# Patient Record
Sex: MALE | Race: WHITE | Employment: OTHER | URBAN - METROPOLITAN AREA
[De-identification: names, ages, dates, MRNs, and addresses within clinical notes are randomized per-mention and may not be internally consistent; named-entity substitution may affect disease eponyms.]

---

## 2017-01-16 RX ORDER — NIFEDIPINE 60 MG/1
TABLET, EXTENDED RELEASE ORAL
Qty: 90 TABLET | Refills: 2 | Status: SHIPPED | OUTPATIENT
Start: 2017-01-16 | End: 2017-10-04 | Stop reason: SDUPTHER

## 2017-01-16 RX ORDER — LISINOPRIL 40 MG/1
TABLET ORAL
Qty: 90 TABLET | Refills: 2 | Status: SHIPPED | OUTPATIENT
Start: 2017-01-16 | End: 2017-10-04 | Stop reason: SDUPTHER

## 2017-03-08 RX ORDER — METFORMIN HYDROCHLORIDE 500 MG/1
TABLET, EXTENDED RELEASE ORAL
Qty: 360 TABLET | Refills: 1 | Status: SHIPPED | OUTPATIENT
Start: 2017-03-08 | End: 2017-08-30 | Stop reason: SDUPTHER

## 2017-03-10 DIAGNOSIS — E78.5 DM TYPE 2 WITH DIABETIC DYSLIPIDEMIA (HCC): ICD-10-CM

## 2017-03-10 DIAGNOSIS — E78.5 HYPERLIPIDEMIA, UNSPECIFIED HYPERLIPIDEMIA TYPE: ICD-10-CM

## 2017-03-10 DIAGNOSIS — R53.83 OTHER MALAISE AND FATIGUE: ICD-10-CM

## 2017-03-10 DIAGNOSIS — R53.81 OTHER MALAISE AND FATIGUE: ICD-10-CM

## 2017-03-10 DIAGNOSIS — E11.69 DM TYPE 2 WITH DIABETIC DYSLIPIDEMIA (HCC): ICD-10-CM

## 2017-03-10 LAB
ALBUMIN SERPL-MCNC: 4.1 G/DL (ref 3.9–4.9)
ALP BLD-CCNC: 99 U/L (ref 35–104)
ALT SERPL-CCNC: 15 U/L (ref 0–41)
ANION GAP SERPL CALCULATED.3IONS-SCNC: 13 MEQ/L (ref 7–13)
ANISOCYTOSIS: ABNORMAL
AST SERPL-CCNC: 18 U/L (ref 0–40)
BASOPHILS ABSOLUTE: 0.1 K/UL (ref 0–0.2)
BASOPHILS RELATIVE PERCENT: 1.1 %
BILIRUB SERPL-MCNC: 0.5 MG/DL (ref 0–1.2)
BUN BLDV-MCNC: 20 MG/DL (ref 8–23)
BURR CELLS: ABNORMAL
CALCIUM SERPL-MCNC: 9.6 MG/DL (ref 8.6–10.2)
CHLORIDE BLD-SCNC: 101 MEQ/L (ref 98–107)
CHOLESTEROL, TOTAL: 128 MG/DL (ref 0–199)
CO2: 26 MEQ/L (ref 22–29)
CREAT SERPL-MCNC: 1.2 MG/DL (ref 0.7–1.2)
EOSINOPHILS ABSOLUTE: 0.4 K/UL (ref 0–0.7)
EOSINOPHILS RELATIVE PERCENT: 5 %
GFR AFRICAN AMERICAN: >60
GFR NON-AFRICAN AMERICAN: 59.8
GLOBULIN: 3.1 G/DL (ref 2.3–3.5)
GLUCOSE BLD-MCNC: 154 MG/DL (ref 74–109)
HBA1C MFR BLD: 7.5 % (ref 4.8–5.9)
HCT VFR BLD CALC: 38.4 % (ref 42–52)
HDLC SERPL-MCNC: 41 MG/DL (ref 40–59)
HEMOGLOBIN: 12.5 G/DL (ref 14–18)
LDL CHOLESTEROL CALCULATED: 67 MG/DL (ref 0–129)
LYMPHOCYTES ABSOLUTE: 2.5 K/UL (ref 1–4.8)
LYMPHOCYTES RELATIVE PERCENT: 35.2 %
MCH RBC QN AUTO: 30.6 PG (ref 27–31.3)
MCHC RBC AUTO-ENTMCNC: 32.6 % (ref 33–37)
MCV RBC AUTO: 94.1 FL (ref 80–100)
MONOCYTES ABSOLUTE: 1 K/UL (ref 0.2–0.8)
MONOCYTES RELATIVE PERCENT: 14.2 %
NEUTROPHILS ABSOLUTE: 3.2 K/UL (ref 1.4–6.5)
NEUTROPHILS RELATIVE PERCENT: 44.5 %
PDW BLD-RTO: 15.1 % (ref 11.5–14.5)
PLATELET # BLD: 246 K/UL (ref 130–400)
POIKILOCYTES: ABNORMAL
POTASSIUM SERPL-SCNC: 4.1 MEQ/L (ref 3.5–5.1)
RBC # BLD: 4.08 M/UL (ref 4.7–6.1)
SLIDE REVIEW: ABNORMAL
SODIUM BLD-SCNC: 140 MEQ/L (ref 132–144)
TOTAL PROTEIN: 7.2 G/DL (ref 6.4–8.1)
TRIGL SERPL-MCNC: 100 MG/DL (ref 0–200)
WBC # BLD: 7.1 K/UL (ref 4.8–10.8)

## 2017-03-13 ENCOUNTER — OFFICE VISIT (OUTPATIENT)
Dept: PRIMARY CARE CLINIC | Age: 72
End: 2017-03-13

## 2017-03-13 VITALS
HEIGHT: 73 IN | HEART RATE: 57 BPM | TEMPERATURE: 96.4 F | OXYGEN SATURATION: 97 % | RESPIRATION RATE: 16 BRPM | BODY MASS INDEX: 30.88 KG/M2 | WEIGHT: 233 LBS | SYSTOLIC BLOOD PRESSURE: 130 MMHG | DIASTOLIC BLOOD PRESSURE: 64 MMHG

## 2017-03-13 DIAGNOSIS — R53.83 OTHER MALAISE AND FATIGUE: ICD-10-CM

## 2017-03-13 DIAGNOSIS — E11.69 DM TYPE 2 WITH DIABETIC DYSLIPIDEMIA (HCC): ICD-10-CM

## 2017-03-13 DIAGNOSIS — R53.81 OTHER MALAISE AND FATIGUE: ICD-10-CM

## 2017-03-13 DIAGNOSIS — J30.2 SEASONAL ALLERGIC RHINITIS, UNSPECIFIED ALLERGIC RHINITIS TRIGGER: ICD-10-CM

## 2017-03-13 DIAGNOSIS — I10 ESSENTIAL HYPERTENSION: ICD-10-CM

## 2017-03-13 DIAGNOSIS — E78.5 HYPERLIPIDEMIA, UNSPECIFIED HYPERLIPIDEMIA TYPE: ICD-10-CM

## 2017-03-13 DIAGNOSIS — E78.5 DM TYPE 2 WITH DIABETIC DYSLIPIDEMIA (HCC): ICD-10-CM

## 2017-03-13 DIAGNOSIS — M70.21 OLECRANON BURSITIS OF RIGHT ELBOW: Primary | ICD-10-CM

## 2017-03-13 PROCEDURE — 3017F COLORECTAL CA SCREEN DOC REV: CPT | Performed by: FAMILY MEDICINE

## 2017-03-13 PROCEDURE — G8419 CALC BMI OUT NRM PARAM NOF/U: HCPCS | Performed by: FAMILY MEDICINE

## 2017-03-13 PROCEDURE — G8427 DOCREV CUR MEDS BY ELIG CLIN: HCPCS | Performed by: FAMILY MEDICINE

## 2017-03-13 PROCEDURE — G8484 FLU IMMUNIZE NO ADMIN: HCPCS | Performed by: FAMILY MEDICINE

## 2017-03-13 PROCEDURE — 1036F TOBACCO NON-USER: CPT | Performed by: FAMILY MEDICINE

## 2017-03-13 PROCEDURE — 4040F PNEUMOC VAC/ADMIN/RCVD: CPT | Performed by: FAMILY MEDICINE

## 2017-03-13 PROCEDURE — 99214 OFFICE O/P EST MOD 30 MIN: CPT | Performed by: FAMILY MEDICINE

## 2017-03-13 PROCEDURE — 3045F PR MOST RECENT HEMOGLOBIN A1C LEVEL 7.0-9.0%: CPT | Performed by: FAMILY MEDICINE

## 2017-03-13 PROCEDURE — 1123F ACP DISCUSS/DSCN MKR DOCD: CPT | Performed by: FAMILY MEDICINE

## 2017-03-13 ASSESSMENT — ENCOUNTER SYMPTOMS
SHORTNESS OF BREATH: 1
BLURRED VISION: 0

## 2017-04-23 RX ORDER — MONTELUKAST SODIUM 10 MG/1
TABLET ORAL
Qty: 90 TABLET | Refills: 3 | Status: SHIPPED | OUTPATIENT
Start: 2017-04-23 | End: 2018-04-04 | Stop reason: SDUPTHER

## 2017-04-23 RX ORDER — SOTALOL HYDROCHLORIDE 120 MG/1
TABLET ORAL
Qty: 180 TABLET | Refills: 2 | Status: SHIPPED | OUTPATIENT
Start: 2017-04-23 | End: 2018-01-07 | Stop reason: SDUPTHER

## 2017-05-25 RX ORDER — PIOGLITAZONEHYDROCHLORIDE 45 MG/1
TABLET ORAL
Qty: 90 TABLET | Refills: 1 | Status: SHIPPED | OUTPATIENT
Start: 2017-05-25 | End: 2017-11-15 | Stop reason: SDUPTHER

## 2017-06-12 DIAGNOSIS — R53.81 OTHER MALAISE AND FATIGUE: ICD-10-CM

## 2017-06-12 DIAGNOSIS — R53.83 OTHER MALAISE AND FATIGUE: ICD-10-CM

## 2017-06-12 DIAGNOSIS — I10 ESSENTIAL HYPERTENSION: ICD-10-CM

## 2017-06-12 DIAGNOSIS — E78.5 DM TYPE 2 WITH DIABETIC DYSLIPIDEMIA (HCC): ICD-10-CM

## 2017-06-12 DIAGNOSIS — E11.69 DM TYPE 2 WITH DIABETIC DYSLIPIDEMIA (HCC): ICD-10-CM

## 2017-06-12 LAB
ALBUMIN SERPL-MCNC: 3.9 G/DL (ref 3.9–4.9)
ALP BLD-CCNC: 85 U/L (ref 35–104)
ALT SERPL-CCNC: 13 U/L (ref 0–41)
ANION GAP SERPL CALCULATED.3IONS-SCNC: 13 MEQ/L (ref 7–13)
AST SERPL-CCNC: 18 U/L (ref 0–40)
BASOPHILS ABSOLUTE: 0.1 K/UL (ref 0–0.2)
BASOPHILS RELATIVE PERCENT: 1.1 %
BILIRUB SERPL-MCNC: 0.6 MG/DL (ref 0–1.2)
BUN BLDV-MCNC: 35 MG/DL (ref 8–23)
CALCIUM SERPL-MCNC: 9.6 MG/DL (ref 8.6–10.2)
CHLORIDE BLD-SCNC: 100 MEQ/L (ref 98–107)
CHOLESTEROL, TOTAL: 116 MG/DL (ref 0–199)
CO2: 25 MEQ/L (ref 22–29)
CREAT SERPL-MCNC: 1.45 MG/DL (ref 0.7–1.2)
EOSINOPHILS ABSOLUTE: 0.4 K/UL (ref 0–0.7)
EOSINOPHILS RELATIVE PERCENT: 6.2 %
GFR AFRICAN AMERICAN: 58.1
GFR NON-AFRICAN AMERICAN: 48
GLOBULIN: 2.9 G/DL (ref 2.3–3.5)
GLUCOSE BLD-MCNC: 130 MG/DL (ref 74–109)
HBA1C MFR BLD: 7.1 % (ref 4.8–5.9)
HCT VFR BLD CALC: 35.7 % (ref 42–52)
HDLC SERPL-MCNC: 37 MG/DL (ref 40–59)
HEMOGLOBIN: 11.8 G/DL (ref 14–18)
LDL CHOLESTEROL CALCULATED: 56 MG/DL (ref 0–129)
LYMPHOCYTES ABSOLUTE: 2.5 K/UL (ref 1–4.8)
LYMPHOCYTES RELATIVE PERCENT: 38.2 %
MCH RBC QN AUTO: 30.7 PG (ref 27–31.3)
MCHC RBC AUTO-ENTMCNC: 32.9 % (ref 33–37)
MCV RBC AUTO: 93.3 FL (ref 80–100)
MONOCYTES ABSOLUTE: 1.2 K/UL (ref 0.2–0.8)
MONOCYTES RELATIVE PERCENT: 18.1 %
NEUTROPHILS ABSOLUTE: 2.3 K/UL (ref 1.4–6.5)
NEUTROPHILS RELATIVE PERCENT: 36.4 %
PDW BLD-RTO: 15.2 % (ref 11.5–14.5)
PLATELET # BLD: 218 K/UL (ref 130–400)
POTASSIUM SERPL-SCNC: 4 MEQ/L (ref 3.5–5.1)
RBC # BLD: 3.82 M/UL (ref 4.7–6.1)
SODIUM BLD-SCNC: 138 MEQ/L (ref 132–144)
TOTAL PROTEIN: 6.8 G/DL (ref 6.4–8.1)
TRIGL SERPL-MCNC: 115 MG/DL (ref 0–200)
WBC # BLD: 6.4 K/UL (ref 4.8–10.8)

## 2017-06-14 ENCOUNTER — OFFICE VISIT (OUTPATIENT)
Dept: PRIMARY CARE CLINIC | Age: 72
End: 2017-06-14

## 2017-06-14 VITALS
HEART RATE: 55 BPM | BODY MASS INDEX: 30.35 KG/M2 | DIASTOLIC BLOOD PRESSURE: 62 MMHG | RESPIRATION RATE: 16 BRPM | TEMPERATURE: 96.7 F | SYSTOLIC BLOOD PRESSURE: 130 MMHG | WEIGHT: 229 LBS | HEIGHT: 73 IN | OXYGEN SATURATION: 97 %

## 2017-06-14 DIAGNOSIS — M47.16 OSTEOARTHRITIS OF LUMBAR SPINE WITH MYELOPATHY: ICD-10-CM

## 2017-06-14 DIAGNOSIS — E78.5 DM TYPE 2 WITH DIABETIC DYSLIPIDEMIA (HCC): ICD-10-CM

## 2017-06-14 DIAGNOSIS — J30.1 SEASONAL ALLERGIC RHINITIS DUE TO POLLEN: ICD-10-CM

## 2017-06-14 DIAGNOSIS — I10 ESSENTIAL HYPERTENSION: Primary | ICD-10-CM

## 2017-06-14 DIAGNOSIS — R06.02 SOB (SHORTNESS OF BREATH): ICD-10-CM

## 2017-06-14 DIAGNOSIS — E11.69 DM TYPE 2 WITH DIABETIC DYSLIPIDEMIA (HCC): ICD-10-CM

## 2017-06-14 DIAGNOSIS — R42 DIZZINESS: ICD-10-CM

## 2017-06-14 DIAGNOSIS — N18.2 TYPE 2 DIABETES MELLITUS WITH STAGE 2 CHRONIC KIDNEY DISEASE, WITHOUT LONG-TERM CURRENT USE OF INSULIN (HCC): ICD-10-CM

## 2017-06-14 DIAGNOSIS — J30.1 ALLERGIC RHINITIS DUE TO POLLEN, UNSPECIFIED RHINITIS SEASONALITY: ICD-10-CM

## 2017-06-14 DIAGNOSIS — E11.22 TYPE 2 DIABETES MELLITUS WITH STAGE 2 CHRONIC KIDNEY DISEASE, WITHOUT LONG-TERM CURRENT USE OF INSULIN (HCC): ICD-10-CM

## 2017-06-14 PROCEDURE — 99214 OFFICE O/P EST MOD 30 MIN: CPT | Performed by: FAMILY MEDICINE

## 2017-06-14 PROCEDURE — 1123F ACP DISCUSS/DSCN MKR DOCD: CPT | Performed by: FAMILY MEDICINE

## 2017-06-14 PROCEDURE — G8417 CALC BMI ABV UP PARAM F/U: HCPCS | Performed by: FAMILY MEDICINE

## 2017-06-14 PROCEDURE — 93000 ELECTROCARDIOGRAM COMPLETE: CPT | Performed by: FAMILY MEDICINE

## 2017-06-14 PROCEDURE — 3046F HEMOGLOBIN A1C LEVEL >9.0%: CPT | Performed by: FAMILY MEDICINE

## 2017-06-14 PROCEDURE — G8427 DOCREV CUR MEDS BY ELIG CLIN: HCPCS | Performed by: FAMILY MEDICINE

## 2017-06-14 PROCEDURE — 3017F COLORECTAL CA SCREEN DOC REV: CPT | Performed by: FAMILY MEDICINE

## 2017-06-14 PROCEDURE — 4040F PNEUMOC VAC/ADMIN/RCVD: CPT | Performed by: FAMILY MEDICINE

## 2017-06-14 PROCEDURE — 1036F TOBACCO NON-USER: CPT | Performed by: FAMILY MEDICINE

## 2017-06-14 RX ORDER — TRIAMCINOLONE ACETONIDE 40 MG/ML
80 INJECTION, SUSPENSION INTRA-ARTICULAR; INTRAMUSCULAR ONCE
Status: COMPLETED | OUTPATIENT
Start: 2017-06-14 | End: 2017-06-14

## 2017-06-14 RX ORDER — FEXOFENADINE HCL 180 MG/1
180 TABLET ORAL DAILY
Qty: 30 TABLET | Refills: 5 | Status: SHIPPED | OUTPATIENT
Start: 2017-06-14 | End: 2021-03-19

## 2017-06-14 RX ORDER — BUDESONIDE AND FORMOTEROL FUMARATE DIHYDRATE 80; 4.5 UG/1; UG/1
2 AEROSOL RESPIRATORY (INHALATION) 2 TIMES DAILY
Qty: 2 INHALER | Refills: 0 | COMMUNITY
Start: 2017-06-14 | End: 2017-09-27

## 2017-06-14 RX ADMIN — TRIAMCINOLONE ACETONIDE 80 MG: 40 INJECTION, SUSPENSION INTRA-ARTICULAR; INTRAMUSCULAR at 09:26

## 2017-06-14 ASSESSMENT — PATIENT HEALTH QUESTIONNAIRE - PHQ9
SUM OF ALL RESPONSES TO PHQ9 QUESTIONS 1 & 2: 0
SUM OF ALL RESPONSES TO PHQ QUESTIONS 1-9: 0
2. FEELING DOWN, DEPRESSED OR HOPELESS: 0
1. LITTLE INTEREST OR PLEASURE IN DOING THINGS: 0

## 2017-06-14 ASSESSMENT — ENCOUNTER SYMPTOMS
BACK PAIN: 1
RHINORRHEA: 1

## 2017-06-19 DIAGNOSIS — R06.02 SOB (SHORTNESS OF BREATH): Primary | ICD-10-CM

## 2017-06-21 ENCOUNTER — HOSPITAL ENCOUNTER (OUTPATIENT)
Dept: NON INVASIVE DIAGNOSTICS | Age: 72
Discharge: HOME | End: 2017-06-21
Payer: MEDICARE

## 2017-06-21 ENCOUNTER — HOSPITAL ENCOUNTER (OUTPATIENT)
Dept: NUCLEAR MEDICINE | Age: 72
Discharge: HOME | End: 2017-06-21
Payer: MEDICARE

## 2017-06-21 VITALS — SYSTOLIC BLOOD PRESSURE: 112 MMHG | DIASTOLIC BLOOD PRESSURE: 70 MMHG

## 2017-06-21 DIAGNOSIS — R06.02 SOB (SHORTNESS OF BREATH): ICD-10-CM

## 2017-06-21 PROCEDURE — 3430000000 HC RX DIAGNOSTIC RADIOPHARMACEUTICAL: Performed by: FAMILY MEDICINE

## 2017-06-21 PROCEDURE — 2580000003 HC RX 258: Performed by: FAMILY MEDICINE

## 2017-06-21 PROCEDURE — 93017 CV STRESS TEST TRACING ONLY: CPT

## 2017-06-21 PROCEDURE — A9502 TC99M TETROFOSMIN: HCPCS | Performed by: FAMILY MEDICINE

## 2017-06-21 PROCEDURE — 78452 HT MUSCLE IMAGE SPECT MULT: CPT

## 2017-06-21 PROCEDURE — 6360000004 HC RX CONTRAST MEDICATION: Performed by: FAMILY MEDICINE

## 2017-06-21 RX ORDER — SODIUM CHLORIDE 0.9 % (FLUSH) 0.9 %
10 SYRINGE (ML) INJECTION 2 TIMES DAILY
Status: DISCONTINUED | OUTPATIENT
Start: 2017-06-21 | End: 2017-06-24 | Stop reason: HOSPADM

## 2017-06-21 RX ADMIN — TETROFOSMIN 10 MILLICURIE: 0.23 INJECTION, POWDER, LYOPHILIZED, FOR SOLUTION INTRAVENOUS at 07:40

## 2017-06-21 RX ADMIN — TETROFOSMIN 30 MILLICURIE: 0.23 INJECTION, POWDER, LYOPHILIZED, FOR SOLUTION INTRAVENOUS at 09:10

## 2017-06-21 RX ADMIN — Medication 10 ML: at 09:10

## 2017-06-21 RX ADMIN — REGADENOSON 0.4 MG: 0.08 INJECTION, SOLUTION INTRAVENOUS at 09:10

## 2017-06-21 RX ADMIN — Medication 10 ML: at 07:40

## 2017-06-25 ASSESSMENT — ENCOUNTER SYMPTOMS
BLURRED VISION: 0
ABDOMINAL PAIN: 0
CHANGE IN BOWEL HABIT: 0

## 2017-06-28 ENCOUNTER — OFFICE VISIT (OUTPATIENT)
Dept: PRIMARY CARE CLINIC | Age: 72
End: 2017-06-28

## 2017-06-28 VITALS
TEMPERATURE: 96.3 F | RESPIRATION RATE: 14 BRPM | WEIGHT: 230 LBS | DIASTOLIC BLOOD PRESSURE: 72 MMHG | HEIGHT: 73 IN | SYSTOLIC BLOOD PRESSURE: 138 MMHG | HEART RATE: 52 BPM | BODY MASS INDEX: 30.48 KG/M2 | OXYGEN SATURATION: 98 %

## 2017-06-28 DIAGNOSIS — E78.5 DM TYPE 2 WITH DIABETIC DYSLIPIDEMIA (HCC): ICD-10-CM

## 2017-06-28 DIAGNOSIS — J30.1 SEASONAL ALLERGIC RHINITIS DUE TO POLLEN: ICD-10-CM

## 2017-06-28 DIAGNOSIS — R06.02 SOB (SHORTNESS OF BREATH): Primary | ICD-10-CM

## 2017-06-28 DIAGNOSIS — M47.16 OSTEOARTHRITIS OF LUMBAR SPINE WITH MYELOPATHY: ICD-10-CM

## 2017-06-28 DIAGNOSIS — E11.69 DM TYPE 2 WITH DIABETIC DYSLIPIDEMIA (HCC): ICD-10-CM

## 2017-06-28 PROCEDURE — 99214 OFFICE O/P EST MOD 30 MIN: CPT | Performed by: FAMILY MEDICINE

## 2017-06-28 PROCEDURE — 3017F COLORECTAL CA SCREEN DOC REV: CPT | Performed by: FAMILY MEDICINE

## 2017-06-28 PROCEDURE — G8427 DOCREV CUR MEDS BY ELIG CLIN: HCPCS | Performed by: FAMILY MEDICINE

## 2017-06-28 PROCEDURE — G8417 CALC BMI ABV UP PARAM F/U: HCPCS | Performed by: FAMILY MEDICINE

## 2017-06-28 PROCEDURE — 1036F TOBACCO NON-USER: CPT | Performed by: FAMILY MEDICINE

## 2017-06-28 PROCEDURE — 1123F ACP DISCUSS/DSCN MKR DOCD: CPT | Performed by: FAMILY MEDICINE

## 2017-06-28 PROCEDURE — 3046F HEMOGLOBIN A1C LEVEL >9.0%: CPT | Performed by: FAMILY MEDICINE

## 2017-06-28 PROCEDURE — 4040F PNEUMOC VAC/ADMIN/RCVD: CPT | Performed by: FAMILY MEDICINE

## 2017-06-28 ASSESSMENT — ENCOUNTER SYMPTOMS
SHORTNESS OF BREATH: 1
HEMOPTYSIS: 0
VOMITING: 0
WHEEZING: 0
SPUTUM PRODUCTION: 0
RHINORRHEA: 0
ORTHOPNEA: 0
ABDOMINAL PAIN: 0
SWOLLEN GLANDS: 0
SORE THROAT: 0

## 2017-07-10 RX ORDER — ATORVASTATIN CALCIUM 10 MG/1
TABLET, FILM COATED ORAL
Qty: 90 TABLET | Refills: 2 | Status: SHIPPED | OUTPATIENT
Start: 2017-07-10 | End: 2018-03-28 | Stop reason: SDUPTHER

## 2017-07-31 RX ORDER — ALLOPURINOL 300 MG/1
TABLET ORAL
Qty: 90 TABLET | Refills: 1 | Status: SHIPPED | OUTPATIENT
Start: 2017-07-31 | End: 2018-01-26 | Stop reason: SDUPTHER

## 2017-07-31 RX ORDER — HYDROCHLOROTHIAZIDE 25 MG/1
TABLET ORAL
Qty: 90 TABLET | Refills: 1 | Status: SHIPPED | OUTPATIENT
Start: 2017-07-31 | End: 2018-01-26 | Stop reason: SDUPTHER

## 2017-08-11 RX ORDER — OMEPRAZOLE 20 MG/1
CAPSULE, DELAYED RELEASE ORAL
Qty: 90 CAPSULE | Refills: 1 | Status: SHIPPED | OUTPATIENT
Start: 2017-08-11 | End: 2018-03-05 | Stop reason: SDUPTHER

## 2017-08-30 RX ORDER — METFORMIN HYDROCHLORIDE 500 MG/1
TABLET, EXTENDED RELEASE ORAL
Qty: 360 TABLET | Refills: 1 | Status: SHIPPED | OUTPATIENT
Start: 2017-08-30 | End: 2018-02-21 | Stop reason: SDUPTHER

## 2017-09-26 DIAGNOSIS — N18.2 TYPE 2 DIABETES MELLITUS WITH STAGE 2 CHRONIC KIDNEY DISEASE, WITHOUT LONG-TERM CURRENT USE OF INSULIN (HCC): ICD-10-CM

## 2017-09-26 DIAGNOSIS — R06.02 SOB (SHORTNESS OF BREATH): ICD-10-CM

## 2017-09-26 DIAGNOSIS — E78.5 DM TYPE 2 WITH DIABETIC DYSLIPIDEMIA (HCC): ICD-10-CM

## 2017-09-26 DIAGNOSIS — E11.22 TYPE 2 DIABETES MELLITUS WITH STAGE 2 CHRONIC KIDNEY DISEASE, WITHOUT LONG-TERM CURRENT USE OF INSULIN (HCC): ICD-10-CM

## 2017-09-26 DIAGNOSIS — E11.69 DM TYPE 2 WITH DIABETIC DYSLIPIDEMIA (HCC): ICD-10-CM

## 2017-09-26 LAB
ALBUMIN SERPL-MCNC: 4 G/DL (ref 3.9–4.9)
ALP BLD-CCNC: 103 U/L (ref 35–104)
ALT SERPL-CCNC: 14 U/L (ref 0–41)
ANION GAP SERPL CALCULATED.3IONS-SCNC: 20 MEQ/L (ref 7–13)
AST SERPL-CCNC: 19 U/L (ref 0–40)
BASOPHILS ABSOLUTE: 0.1 K/UL (ref 0–0.2)
BASOPHILS RELATIVE PERCENT: 0.9 %
BILIRUB SERPL-MCNC: 0.6 MG/DL (ref 0–1.2)
BUN BLDV-MCNC: 24 MG/DL (ref 8–23)
CALCIUM SERPL-MCNC: 9.9 MG/DL (ref 8.6–10.2)
CHLORIDE BLD-SCNC: 98 MEQ/L (ref 98–107)
CHOLESTEROL, TOTAL: 98 MG/DL (ref 0–199)
CO2: 25 MEQ/L (ref 22–29)
CREAT SERPL-MCNC: 1.07 MG/DL (ref 0.7–1.2)
EOSINOPHILS ABSOLUTE: 0.2 K/UL (ref 0–0.7)
EOSINOPHILS RELATIVE PERCENT: 3.9 %
GFR AFRICAN AMERICAN: >60
GFR NON-AFRICAN AMERICAN: >60
GLOBULIN: 3.1 G/DL (ref 2.3–3.5)
GLUCOSE BLD-MCNC: 123 MG/DL (ref 74–109)
HBA1C MFR BLD: 7.3 % (ref 4.8–5.9)
HCT VFR BLD CALC: 37.8 % (ref 42–52)
HDLC SERPL-MCNC: 38 MG/DL (ref 40–59)
HEMOGLOBIN: 12.3 G/DL (ref 14–18)
LDL CHOLESTEROL CALCULATED: 44 MG/DL (ref 0–129)
LYMPHOCYTES ABSOLUTE: 2 K/UL (ref 1–4.8)
LYMPHOCYTES RELATIVE PERCENT: 31.5 %
MCH RBC QN AUTO: 31.5 PG (ref 27–31.3)
MCHC RBC AUTO-ENTMCNC: 32.5 % (ref 33–37)
MCV RBC AUTO: 96.9 FL (ref 80–100)
MONOCYTES ABSOLUTE: 1 K/UL (ref 0.2–0.8)
MONOCYTES RELATIVE PERCENT: 15.9 %
NEUTROPHILS ABSOLUTE: 3 K/UL (ref 1.4–6.5)
NEUTROPHILS RELATIVE PERCENT: 47.8 %
PDW BLD-RTO: 15.9 % (ref 11.5–14.5)
PLATELET # BLD: 244 K/UL (ref 130–400)
POTASSIUM SERPL-SCNC: 4.4 MEQ/L (ref 3.5–5.1)
RBC # BLD: 3.9 M/UL (ref 4.7–6.1)
SODIUM BLD-SCNC: 143 MEQ/L (ref 132–144)
TOTAL PROTEIN: 7.1 G/DL (ref 6.4–8.1)
TRIGL SERPL-MCNC: 78 MG/DL (ref 0–200)
WBC # BLD: 6.2 K/UL (ref 4.8–10.8)

## 2017-09-27 ENCOUNTER — OFFICE VISIT (OUTPATIENT)
Dept: PRIMARY CARE CLINIC | Age: 72
End: 2017-09-27

## 2017-09-27 VITALS
RESPIRATION RATE: 16 BRPM | BODY MASS INDEX: 30.22 KG/M2 | WEIGHT: 228 LBS | TEMPERATURE: 96.8 F | HEIGHT: 73 IN | DIASTOLIC BLOOD PRESSURE: 86 MMHG | OXYGEN SATURATION: 95 % | HEART RATE: 51 BPM | SYSTOLIC BLOOD PRESSURE: 132 MMHG

## 2017-09-27 DIAGNOSIS — R53.83 FATIGUE, UNSPECIFIED TYPE: ICD-10-CM

## 2017-09-27 DIAGNOSIS — Z23 NEED FOR INFLUENZA VACCINATION: ICD-10-CM

## 2017-09-27 DIAGNOSIS — E78.5 DM TYPE 2 WITH DIABETIC DYSLIPIDEMIA (HCC): ICD-10-CM

## 2017-09-27 DIAGNOSIS — Z98.890 H/O LUMBOSACRAL SPINE SURGERY: ICD-10-CM

## 2017-09-27 DIAGNOSIS — E11.69 DM TYPE 2 WITH DIABETIC DYSLIPIDEMIA (HCC): ICD-10-CM

## 2017-09-27 DIAGNOSIS — M47.16 OSTEOARTHRITIS OF LUMBAR SPINE WITH MYELOPATHY: Primary | ICD-10-CM

## 2017-09-27 DIAGNOSIS — Z12.5 SCREENING FOR PROSTATE CANCER: ICD-10-CM

## 2017-09-27 DIAGNOSIS — E55.9 VITAMIN D DEFICIENCY: ICD-10-CM

## 2017-09-27 DIAGNOSIS — E78.5 OTHER AND UNSPECIFIED HYPERLIPIDEMIA: ICD-10-CM

## 2017-09-27 PROCEDURE — 1036F TOBACCO NON-USER: CPT | Performed by: FAMILY MEDICINE

## 2017-09-27 PROCEDURE — 90662 IIV NO PRSV INCREASED AG IM: CPT | Performed by: FAMILY MEDICINE

## 2017-09-27 PROCEDURE — 1123F ACP DISCUSS/DSCN MKR DOCD: CPT | Performed by: FAMILY MEDICINE

## 2017-09-27 PROCEDURE — G8417 CALC BMI ABV UP PARAM F/U: HCPCS | Performed by: FAMILY MEDICINE

## 2017-09-27 PROCEDURE — G8427 DOCREV CUR MEDS BY ELIG CLIN: HCPCS | Performed by: FAMILY MEDICINE

## 2017-09-27 PROCEDURE — 4040F PNEUMOC VAC/ADMIN/RCVD: CPT | Performed by: FAMILY MEDICINE

## 2017-09-27 PROCEDURE — 3017F COLORECTAL CA SCREEN DOC REV: CPT | Performed by: FAMILY MEDICINE

## 2017-09-27 PROCEDURE — 3046F HEMOGLOBIN A1C LEVEL >9.0%: CPT | Performed by: FAMILY MEDICINE

## 2017-09-27 PROCEDURE — G0008 ADMIN INFLUENZA VIRUS VAC: HCPCS | Performed by: FAMILY MEDICINE

## 2017-09-27 PROCEDURE — 99214 OFFICE O/P EST MOD 30 MIN: CPT | Performed by: FAMILY MEDICINE

## 2017-09-27 RX ORDER — PREDNISONE 10 MG/1
TABLET ORAL
Qty: 30 TABLET | Refills: 0 | Status: SHIPPED | OUTPATIENT
Start: 2017-09-27 | End: 2017-10-11

## 2017-09-27 ASSESSMENT — ENCOUNTER SYMPTOMS
SHORTNESS OF BREATH: 1
CHEST TIGHTNESS: 0

## 2017-09-28 ASSESSMENT — ENCOUNTER SYMPTOMS
BLURRED VISION: 0
ORTHOPNEA: 0

## 2017-10-04 RX ORDER — NIFEDIPINE 60 MG/1
TABLET, EXTENDED RELEASE ORAL
Qty: 90 TABLET | Refills: 1 | Status: SHIPPED | OUTPATIENT
Start: 2017-10-04 | End: 2018-03-28 | Stop reason: SDUPTHER

## 2017-10-04 RX ORDER — LISINOPRIL 40 MG/1
TABLET ORAL
Qty: 90 TABLET | Refills: 1 | Status: SHIPPED | OUTPATIENT
Start: 2017-10-04 | End: 2018-03-28 | Stop reason: SDUPTHER

## 2017-10-13 ENCOUNTER — OFFICE VISIT (OUTPATIENT)
Dept: PULMONOLOGY | Age: 72
End: 2017-10-13

## 2017-10-13 VITALS
HEIGHT: 73 IN | OXYGEN SATURATION: 95 % | BODY MASS INDEX: 31.68 KG/M2 | SYSTOLIC BLOOD PRESSURE: 138 MMHG | DIASTOLIC BLOOD PRESSURE: 72 MMHG | TEMPERATURE: 95.4 F | WEIGHT: 239 LBS | HEART RATE: 56 BPM

## 2017-10-13 DIAGNOSIS — G47.33 OSA ON CPAP: Primary | ICD-10-CM

## 2017-10-13 DIAGNOSIS — J45.20 MILD INTERMITTENT ASTHMA WITHOUT COMPLICATION: ICD-10-CM

## 2017-10-13 DIAGNOSIS — E66.9 OBESITY (BMI 30-39.9): ICD-10-CM

## 2017-10-13 DIAGNOSIS — Z99.89 OSA ON CPAP: Primary | ICD-10-CM

## 2017-10-13 PROCEDURE — G8427 DOCREV CUR MEDS BY ELIG CLIN: HCPCS | Performed by: INTERNAL MEDICINE

## 2017-10-13 PROCEDURE — G8484 FLU IMMUNIZE NO ADMIN: HCPCS | Performed by: INTERNAL MEDICINE

## 2017-10-13 PROCEDURE — G8417 CALC BMI ABV UP PARAM F/U: HCPCS | Performed by: INTERNAL MEDICINE

## 2017-10-13 PROCEDURE — 1123F ACP DISCUSS/DSCN MKR DOCD: CPT | Performed by: INTERNAL MEDICINE

## 2017-10-13 PROCEDURE — 99214 OFFICE O/P EST MOD 30 MIN: CPT | Performed by: INTERNAL MEDICINE

## 2017-10-13 PROCEDURE — 3017F COLORECTAL CA SCREEN DOC REV: CPT | Performed by: INTERNAL MEDICINE

## 2017-10-13 PROCEDURE — 1036F TOBACCO NON-USER: CPT | Performed by: INTERNAL MEDICINE

## 2017-10-13 PROCEDURE — 4040F PNEUMOC VAC/ADMIN/RCVD: CPT | Performed by: INTERNAL MEDICINE

## 2017-10-13 RX ORDER — ALBUTEROL SULFATE 90 UG/1
2 AEROSOL, METERED RESPIRATORY (INHALATION) EVERY 6 HOURS PRN
Qty: 1 INHALER | Refills: 3 | Status: SHIPPED | OUTPATIENT
Start: 2017-10-13 | End: 2018-09-20 | Stop reason: SDUPTHER

## 2017-10-13 ASSESSMENT — ENCOUNTER SYMPTOMS
EYE ITCHING: 0
VOICE CHANGE: 0
NAUSEA: 0
DIARRHEA: 0
COUGH: 0
SHORTNESS OF BREATH: 0
RHINORRHEA: 0
WHEEZING: 0
CHEST TIGHTNESS: 0
ABDOMINAL PAIN: 0
VOMITING: 0
SORE THROAT: 0

## 2017-10-13 NOTE — PROGRESS NOTES
Subjective:     Yogi Curtis is a 80 y.o. male who complains today of:     Chief Complaint   Patient presents with    Follow-up     cpap       HPI  Patient is using CPAP with    12  centimeters of H2O with heated humidity. Patient is using CPAP for about  5-6 hours every night. Patient is using CPAP with  Nasal Pillow  mask  Patient said  sleep is restful with the CPAP use. No snoring with CPAP use  No early morning headache. No complaint of daytime sleepiness or tiredness  Patient denies taking naps. Patient denies difficulty falling asleep or staying asleep. Allergies:  Diflucan [fluconazole]  Past Medical History:   Diagnosis Date    ? Parotiditis 9/22/2015    ? Parotiditis, ST mass just anterior to Parotid, R 10/2/2015    Allergic rhinitis     Anemia     Lombardi's esophagus     Bronchitis     Colon polyp     Eczema     Hypertension     Mandibular mass 9/22/2015    Osteoarthritis     RAD (reactive airway disease)     Type II or unspecified type diabetes mellitus without mention of complication, not stated as uncontrolled      Past Surgical History:   Procedure Laterality Date    BACK SURGERY  2007    removal of broken disk low back    BACK SURGERY N/A 9/2013    lower back    CARDIAC CATHETERIZATION      CHOLECYSTECTOMY  2007    CHOLECYSTECTOMY      COLONOSCOPY  11/18/08    DR HATHAWAY    COLONOSCOPY  03/27/14    DR. VELASCO    LITHOTRIPSY  2007    OTHER SURGICAL HISTORY  12/2/2013    LEVATOR ADVANCEMENT BOTH EYES    SPLENECTOMY  1949    UPPER GASTROINTESTINAL ENDOSCOPY  11/18/08    DR HATHAWAY *REPEAT IN 2 YEARS!!    UPPER GASTROINTESTINAL ENDOSCOPY  03/27/14    DR. VELASCO     Family History   Problem Relation Age of Onset    High Blood Pressure Father     Stroke Father     Cancer Mother      Social History     Social History    Marital status:      Spouse name: N/A    Number of children: N/A    Years of education: N/A     Occupational History    Not on file.     Social History Main Topics    Smoking status: Never Smoker    Smokeless tobacco: Never Used    Alcohol use No    Drug use: No    Sexual activity: Not on file     Other Topics Concern    Not on file     Social History Narrative    No narrative on file         Review of Systems   Constitutional: Negative for chills, diaphoresis, fatigue and fever. HENT: Negative for congestion, mouth sores, nosebleeds, postnasal drip, rhinorrhea, sneezing, sore throat and voice change. Eyes: Negative for itching and visual disturbance. Respiratory: Negative for cough, chest tightness, shortness of breath and wheezing. Cardiovascular: Negative. Negative for chest pain, palpitations and leg swelling. Gastrointestinal: Negative for abdominal pain, diarrhea, nausea and vomiting. Genitourinary: Negative for difficulty urinating and hematuria. Musculoskeletal: Negative for arthralgias, joint swelling and myalgias. Skin: Negative for rash. Allergic/Immunologic: Negative for environmental allergies. Neurological: Negative for dizziness, tremors, weakness and headaches. Psychiatric/Behavioral: Negative for behavioral problems and sleep disturbance. Objective:     Vitals:    10/13/17 0912   BP: 138/72   Site: Right Arm   Position: Sitting   Cuff Size: Medium Adult   Pulse: 56   Temp: 95.4 °F (35.2 °C)   TempSrc: Temporal   SpO2: 95%   Weight: 239 lb (108.4 kg)   Height: 6' 1\" (1.854 m)         Physical Exam   Constitutional: He is oriented to person, place, and time. He appears well-developed and well-nourished. obese   HENT:   Head: Normocephalic and atraumatic. Nose: Nose normal.   Mouth/Throat: Oropharynx is clear and moist.   overhanging soft palate. Eyes: Conjunctivae and EOM are normal. Pupils are equal, round, and reactive to light. Neck: No JVD present. No tracheal deviation present. No thyromegaly present. Cardiovascular: Normal rate and regular rhythm.   Exam reveals no gallop and no friction rub. No murmur heard. Pulmonary/Chest: Effort normal and breath sounds normal. No respiratory distress. He has no wheezes. He has no rales. He exhibits no tenderness. Abdominal: He exhibits no distension. Musculoskeletal: Normal range of motion. Lymphadenopathy:     He has no cervical adenopathy. Neurological: He is alert and oriented to person, place, and time. No cranial nerve deficit. Skin: Skin is warm and dry. No rash noted. Psychiatric: He has a normal mood and affect.  His behavior is normal.       Current Outpatient Prescriptions   Medication Sig Dispense Refill    CPAP Machine MISC by Does not apply route       albuterol sulfate HFA (PROAIR HFA) 108 (90 Base) MCG/ACT inhaler Inhale 2 puffs into the lungs every 6 hours as needed for Wheezing 1 Inhaler 3    NIFEdipine (PROCARDIA XL) 60 MG extended release tablet TAKE 1 TABLET BY MOUTH ONCE DAILY 90 tablet 1    lisinopril (PRINIVIL;ZESTRIL) 40 MG tablet TAKE 1 TABLET BY MOUTH ONCE DAILY 90 tablet 1    ONE TOUCH ULTRA TEST strip TEST TWICE A  strip 3    metFORMIN (GLUCOPHAGE-XR) 500 MG extended release tablet TAKE 1 TABLET BY MOUTH 4 TIMES A  tablet 1    omeprazole (PRILOSEC) 20 MG delayed release capsule TAKE 1 CAPSULE BY MOUTH ONCE DAILY 90 capsule 1    hydrochlorothiazide (HYDRODIURIL) 25 MG tablet TAKE 1/2 TABLET BY MOUTH TWICE DAILY 90 tablet 1    allopurinol (ZYLOPRIM) 300 MG tablet TAKE 1 TABLET BY MOUTH ONCE DAILY 90 tablet 1    atorvastatin (LIPITOR) 10 MG tablet TAKE 1 TABLET BY MOUTH ONCE DAILY 90 tablet 2    fexofenadine (ALLEGRA ALLERGY) 180 MG tablet Take 1 tablet by mouth daily 30 tablet 5    pioglitazone (ACTOS) 45 MG tablet TAKE 1 TABLET BY MOUTH ONCE DAILY 90 tablet 1    SOTALOL  MG TABS TAKE 1 TABLET BY MOUTH TWICE DAILY 180 tablet 2    montelukast (SINGULAIR) 10 MG tablet TAKE 1 TABLET BY MOUTH EVERY DAY AT NIGHT 90 tablet 3    rivaroxaban (XARELTO) 20 MG TABS tablet

## 2017-11-15 RX ORDER — PIOGLITAZONEHYDROCHLORIDE 45 MG/1
TABLET ORAL
Qty: 90 TABLET | Refills: 1 | Status: SHIPPED | OUTPATIENT
Start: 2017-11-15 | End: 2018-05-09 | Stop reason: SDUPTHER

## 2017-12-18 DIAGNOSIS — E78.5 DM TYPE 2 WITH DIABETIC DYSLIPIDEMIA (HCC): ICD-10-CM

## 2017-12-18 DIAGNOSIS — E11.69 DM TYPE 2 WITH DIABETIC DYSLIPIDEMIA (HCC): ICD-10-CM

## 2017-12-18 DIAGNOSIS — Z12.5 SCREENING FOR PROSTATE CANCER: ICD-10-CM

## 2017-12-18 DIAGNOSIS — E55.9 VITAMIN D DEFICIENCY: ICD-10-CM

## 2017-12-18 DIAGNOSIS — E78.49 OTHER HYPERLIPIDEMIA: ICD-10-CM

## 2017-12-18 DIAGNOSIS — R53.83 FATIGUE, UNSPECIFIED TYPE: ICD-10-CM

## 2017-12-18 LAB
ALBUMIN SERPL-MCNC: 4.1 G/DL (ref 3.9–4.9)
ALP BLD-CCNC: 101 U/L (ref 35–104)
ALT SERPL-CCNC: 13 U/L (ref 0–41)
ANION GAP SERPL CALCULATED.3IONS-SCNC: 17 MEQ/L (ref 7–13)
ANISOCYTOSIS: ABNORMAL
AST SERPL-CCNC: 16 U/L (ref 0–40)
ATYPICAL LYMPHOCYTE RELATIVE PERCENT: 3 %
BANDED NEUTROPHILS RELATIVE PERCENT: 2 %
BASOPHILS ABSOLUTE: 0.3 K/UL (ref 0–0.2)
BASOPHILS RELATIVE PERCENT: 4 %
BILIRUB SERPL-MCNC: 0.4 MG/DL (ref 0–1.2)
BUN BLDV-MCNC: 28 MG/DL (ref 8–23)
BURR CELLS: ABNORMAL
CALCIUM SERPL-MCNC: 9.4 MG/DL (ref 8.6–10.2)
CHLORIDE BLD-SCNC: 101 MEQ/L (ref 98–107)
CHOLESTEROL, TOTAL: 116 MG/DL (ref 0–199)
CO2: 28 MEQ/L (ref 22–29)
CREAT SERPL-MCNC: 0.96 MG/DL (ref 0.7–1.2)
EOSINOPHILS ABSOLUTE: 0.2 K/UL (ref 0–0.7)
EOSINOPHILS RELATIVE PERCENT: 3 %
GFR AFRICAN AMERICAN: >60
GFR NON-AFRICAN AMERICAN: >60
GLOBULIN: 2.8 G/DL (ref 2.3–3.5)
GLUCOSE BLD-MCNC: 123 MG/DL (ref 74–109)
HBA1C MFR BLD: 7.2 % (ref 4.8–5.9)
HCT VFR BLD CALC: 38.2 % (ref 42–52)
HDLC SERPL-MCNC: 37 MG/DL (ref 40–59)
HEMATOLOGY PATH CONSULT: YES
HEMOGLOBIN: 12.1 G/DL (ref 14–18)
HYPOCHROMIA: ABNORMAL
LDL CHOLESTEROL CALCULATED: 61 MG/DL (ref 0–129)
LYMPHOCYTES ABSOLUTE: 2.3 K/UL (ref 1–4.8)
LYMPHOCYTES RELATIVE PERCENT: 28 %
MACROCYTES: ABNORMAL
MCH RBC QN AUTO: 31.9 PG (ref 27–31.3)
MCHC RBC AUTO-ENTMCNC: 31.8 % (ref 33–37)
MCV RBC AUTO: 100.4 FL (ref 80–100)
MONOCYTES ABSOLUTE: 1 K/UL (ref 0.2–0.8)
MONOCYTES RELATIVE PERCENT: 13.1 %
NEUTROPHILS ABSOLUTE: 3.7 K/UL (ref 1.4–6.5)
NEUTROPHILS RELATIVE PERCENT: 48 %
PDW BLD-RTO: 15.6 % (ref 11.5–14.5)
PLATELET # BLD: 260 K/UL (ref 130–400)
PLATELET SLIDE REVIEW: ADEQUATE
POIKILOCYTES: ABNORMAL
POTASSIUM SERPL-SCNC: 4.6 MEQ/L (ref 3.5–5.1)
PROSTATE SPECIFIC ANTIGEN: 1.72 NG/ML (ref 0–6.22)
RBC # BLD: 3.8 M/UL (ref 4.7–6.1)
SCHISTOCYTES: ABNORMAL
SMUDGE CELLS: 3.7
SODIUM BLD-SCNC: 146 MEQ/L (ref 132–144)
SPHEROCYTES: ABNORMAL
TARGET CELLS: ABNORMAL
TOTAL PROTEIN: 6.9 G/DL (ref 6.4–8.1)
TRIGL SERPL-MCNC: 88 MG/DL (ref 0–200)
VACUOLATED NEUTROPHILS: ABNORMAL
VITAMIN D 25-HYDROXY: 28.3 NG/ML (ref 30–100)
WBC # BLD: 7.4 K/UL (ref 4.8–10.8)

## 2017-12-20 ENCOUNTER — OFFICE VISIT (OUTPATIENT)
Dept: PRIMARY CARE CLINIC | Age: 72
End: 2017-12-20

## 2017-12-20 VITALS
SYSTOLIC BLOOD PRESSURE: 130 MMHG | WEIGHT: 228 LBS | OXYGEN SATURATION: 97 % | DIASTOLIC BLOOD PRESSURE: 82 MMHG | HEIGHT: 73 IN | HEART RATE: 52 BPM | BODY MASS INDEX: 30.22 KG/M2 | RESPIRATION RATE: 14 BRPM | TEMPERATURE: 97.2 F

## 2017-12-20 DIAGNOSIS — M47.16 OSTEOARTHRITIS OF LUMBAR SPINE WITH MYELOPATHY: ICD-10-CM

## 2017-12-20 DIAGNOSIS — E55.9 VITAMIN D DEFICIENCY: ICD-10-CM

## 2017-12-20 DIAGNOSIS — G47.33 OSA ON CPAP: Primary | ICD-10-CM

## 2017-12-20 DIAGNOSIS — J30.1 CHRONIC SEASONAL ALLERGIC RHINITIS DUE TO POLLEN: ICD-10-CM

## 2017-12-20 DIAGNOSIS — Z99.89 OSA ON CPAP: Primary | ICD-10-CM

## 2017-12-20 DIAGNOSIS — N18.2 TYPE 2 DIABETES MELLITUS WITH STAGE 2 CHRONIC KIDNEY DISEASE, WITHOUT LONG-TERM CURRENT USE OF INSULIN (HCC): ICD-10-CM

## 2017-12-20 DIAGNOSIS — E11.22 TYPE 2 DIABETES MELLITUS WITH STAGE 2 CHRONIC KIDNEY DISEASE, WITHOUT LONG-TERM CURRENT USE OF INSULIN (HCC): ICD-10-CM

## 2017-12-20 DIAGNOSIS — D64.9 ANEMIA, UNSPECIFIED TYPE: ICD-10-CM

## 2017-12-20 DIAGNOSIS — E11.69 DM TYPE 2 WITH DIABETIC DYSLIPIDEMIA (HCC): ICD-10-CM

## 2017-12-20 DIAGNOSIS — E78.5 DM TYPE 2 WITH DIABETIC DYSLIPIDEMIA (HCC): ICD-10-CM

## 2017-12-20 PROCEDURE — 3017F COLORECTAL CA SCREEN DOC REV: CPT | Performed by: FAMILY MEDICINE

## 2017-12-20 PROCEDURE — G8427 DOCREV CUR MEDS BY ELIG CLIN: HCPCS | Performed by: FAMILY MEDICINE

## 2017-12-20 PROCEDURE — 1123F ACP DISCUSS/DSCN MKR DOCD: CPT | Performed by: FAMILY MEDICINE

## 2017-12-20 PROCEDURE — G8484 FLU IMMUNIZE NO ADMIN: HCPCS | Performed by: FAMILY MEDICINE

## 2017-12-20 PROCEDURE — 3045F PR MOST RECENT HEMOGLOBIN A1C LEVEL 7.0-9.0%: CPT | Performed by: FAMILY MEDICINE

## 2017-12-20 PROCEDURE — 99214 OFFICE O/P EST MOD 30 MIN: CPT | Performed by: FAMILY MEDICINE

## 2017-12-20 PROCEDURE — G8417 CALC BMI ABV UP PARAM F/U: HCPCS | Performed by: FAMILY MEDICINE

## 2017-12-20 PROCEDURE — 4040F PNEUMOC VAC/ADMIN/RCVD: CPT | Performed by: FAMILY MEDICINE

## 2017-12-20 PROCEDURE — 1036F TOBACCO NON-USER: CPT | Performed by: FAMILY MEDICINE

## 2017-12-20 RX ORDER — MULTIVIT-MIN/IRON/FOLIC ACID/K 18-600-40
CAPSULE ORAL
Qty: 90 CAPSULE | Refills: 1 | Status: SHIPPED | OUTPATIENT
Start: 2017-12-20 | End: 2018-09-20

## 2017-12-20 ASSESSMENT — ENCOUNTER SYMPTOMS
SHORTNESS OF BREATH: 0
APNEA: 0
ORTHOPNEA: 0
BLURRED VISION: 0

## 2017-12-20 NOTE — PROGRESS NOTES
Jasmin Waters 71 y.o. male presents today with   Chief Complaint   Patient presents with    Diabetes     patient following up for chronic DM. patient states his sugar averages around 125.  Hypertension     patient following up for chronic HTN. patient states he had a headaches yesterday which is rare. patient denies chest pain, sob, or dizziness.  Discuss Medications     patient states his insurance will not cover prilosec after the beginning of the year and he would like to discuss other medications. Hypertension   This is a chronic problem. The current episode started more than 1 year ago. The problem has been resolved since onset. The problem is controlled. Pertinent negatives include no anxiety, blurred vision, chest pain, malaise/fatigue, neck pain, orthopnea, palpitations, peripheral edema, PND, shortness of breath or sweats. Fu dm,lipids,oa    Chronic,stable    Past Medical History:   Diagnosis Date    ? Parotiditis 9/22/2015    ? Parotiditis, ST mass just anterior to Parotid, R 10/2/2015    Allergic rhinitis     Anemia     Lombardi's esophagus     Bronchitis     Colon polyp     Eczema     Hypertension     Mandibular mass 9/22/2015    Osteoarthritis     RAD (reactive airway disease)     Type II or unspecified type diabetes mellitus without mention of complication, not stated as uncontrolled      Patient Active Problem List    Diagnosis Date Noted    Vitamin D deficiency 12/20/2017    H/O lumbosacral spine surgery 09/27/2017    Seasonal allergic rhinitis due to pollen 06/14/2017    Olecranon bursitis of right elbow 03/13/2017    Osteoarthritis of lumbar spine with myelopathy 06/15/2016    Other and unspecified hyperlipidemia 12/16/2015    DM type 2 with diabetic dyslipidemia ([facility] [address] 10/19/2015    Primary osteoarthritis of both knees 10/08/2015    ?  Parotiditis, ST mass just anterior to Parotid, R 10/02/2015    Mandibular mass 09/22/2015    Ecchymosis 08/18/2015    History Main Topics    Smoking status: Never Smoker    Smokeless tobacco: Never Used    Alcohol use No    Drug use: No    Sexual activity: Not Asked     Other Topics Concern    None     Social History Narrative    None     Allergies   Allergen Reactions    Diflucan [Fluconazole]        Review of Systems   Constitutional: Negative for activity change and malaise/fatigue. HENT: Negative for congestion. Eyes: Negative for blurred vision. Respiratory: Negative for apnea and shortness of breath. Cardiovascular: Negative for chest pain, palpitations, orthopnea and PND. Musculoskeletal: Negative for neck pain. Vitals:    12/20/17 0747   BP: 130/82   Pulse: 52   Resp: 14   Temp: 97.2 °F (36.2 °C)   TempSrc: Tympanic   SpO2: 97%   Weight: 228 lb (103.4 kg)   Height: 6' 1\" (1.854 m)       Physical Exam     PHYSICAL EXAMINATION:   VITAL SIGNS: are as recorded. GENERAL:  The patient appears well nourished and well-developed, and with normal affect. No acute respiratory distress. Alert and oriented times 3. No skin rashes. HEENT:  TMs normal bilaterally. Canals and ears normal. Pharynx is clear. Extraocular eye motions intact and pain free. Pupils reactive and equally round. Sclerae and conjunctivae clear, normocephalic and atraumatic. RESPIRATORY:  Clear and equal breath sounds with no acute respiratory distress. HEART: Regular rhythm without murmur, rub or gallop. ABDOMEN:  soft, nontender. No masses, guarding or rebound. Normoactive bowel sounds. LOW BACK: No tenderness to palpation of inferior lumbar spine or either sacroiliac joint area. Assessment/Plan  Susan Carrillo was seen today for diabetes, hypertension and discuss medications. Diagnoses and all orders for this visit:    ARPITA on CPAP    Type 2 diabetes mellitus with stage 2 chronic kidney disease, without long-term current use of insulin (HCC)  -     Comprehensive Metabolic Panel;  Future  -     Lipid Panel; Future  -     Hemoglobin A1C; Future    DM type 2 with diabetic dyslipidemia (HCC)    Osteoarthritis of lumbar spine with myelopathy    Chronic seasonal allergic rhinitis due to pollen    Vitamin D deficiency  -     Cholecalciferol (VITAMIN D) 2000 units CAPS capsule; qd  -     Vitamin D 25 Hydroxy; Future    Anemia, unspecified type  -     CBC Auto Differential; Future    see labs wnl    Labs prior    The current medical regimen is effective;  continue present plan and medications. adacel next    Health Maintenance Due   Topic Date Due    Hepatitis C screen  1945    DTaP/Tdap/Td vaccine (1 - Tdap) 11/23/1965    Diabetic retinal exam  11/03/2016    Diabetic foot exam  12/13/2017       Controlled Substances Monitoring:      Return in about 3 months (around 3/20/2018).     Dayanara Tabares MD

## 2017-12-21 LAB — HEMATOLOGY PATH CONSULT: NORMAL

## 2018-01-08 RX ORDER — SOTALOL HYDROCHLORIDE 120 MG/1
TABLET ORAL
Qty: 180 TABLET | Refills: 2 | Status: SHIPPED | OUTPATIENT
Start: 2018-01-08 | End: 2019-01-25 | Stop reason: SDUPTHER

## 2018-01-15 ENCOUNTER — OFFICE VISIT (OUTPATIENT)
Dept: PRIMARY CARE CLINIC | Age: 73
End: 2018-01-15

## 2018-01-15 VITALS
TEMPERATURE: 97.4 F | WEIGHT: 225 LBS | HEART RATE: 68 BPM | SYSTOLIC BLOOD PRESSURE: 112 MMHG | HEIGHT: 73 IN | RESPIRATION RATE: 12 BRPM | DIASTOLIC BLOOD PRESSURE: 62 MMHG | BODY MASS INDEX: 29.82 KG/M2

## 2018-01-15 DIAGNOSIS — I10 ESSENTIAL HYPERTENSION: ICD-10-CM

## 2018-01-15 DIAGNOSIS — J40 BRONCHITIS: ICD-10-CM

## 2018-01-15 DIAGNOSIS — J01.00 SUBACUTE MAXILLARY SINUSITIS: ICD-10-CM

## 2018-01-15 DIAGNOSIS — R05.9 COUGH: Primary | ICD-10-CM

## 2018-01-15 DIAGNOSIS — N18.30 TYPE 2 DIABETES MELLITUS WITH STAGE 3 CHRONIC KIDNEY DISEASE, WITHOUT LONG-TERM CURRENT USE OF INSULIN (HCC): ICD-10-CM

## 2018-01-15 DIAGNOSIS — E11.22 TYPE 2 DIABETES MELLITUS WITH STAGE 3 CHRONIC KIDNEY DISEASE, WITHOUT LONG-TERM CURRENT USE OF INSULIN (HCC): ICD-10-CM

## 2018-01-15 LAB
INFLUENZA A ANTIBODY: NEGATIVE
INFLUENZA B ANTIBODY: NEGATIVE
S PYO AG THROAT QL: NORMAL

## 2018-01-15 PROCEDURE — G8417 CALC BMI ABV UP PARAM F/U: HCPCS | Performed by: FAMILY MEDICINE

## 2018-01-15 PROCEDURE — 1036F TOBACCO NON-USER: CPT | Performed by: FAMILY MEDICINE

## 2018-01-15 PROCEDURE — 87804 INFLUENZA ASSAY W/OPTIC: CPT | Performed by: FAMILY MEDICINE

## 2018-01-15 PROCEDURE — 4040F PNEUMOC VAC/ADMIN/RCVD: CPT | Performed by: FAMILY MEDICINE

## 2018-01-15 PROCEDURE — 3017F COLORECTAL CA SCREEN DOC REV: CPT | Performed by: FAMILY MEDICINE

## 2018-01-15 PROCEDURE — 96372 THER/PROPH/DIAG INJ SC/IM: CPT | Performed by: FAMILY MEDICINE

## 2018-01-15 PROCEDURE — 1123F ACP DISCUSS/DSCN MKR DOCD: CPT | Performed by: FAMILY MEDICINE

## 2018-01-15 PROCEDURE — 99213 OFFICE O/P EST LOW 20 MIN: CPT | Performed by: FAMILY MEDICINE

## 2018-01-15 PROCEDURE — G8484 FLU IMMUNIZE NO ADMIN: HCPCS | Performed by: FAMILY MEDICINE

## 2018-01-15 PROCEDURE — 87880 STREP A ASSAY W/OPTIC: CPT | Performed by: FAMILY MEDICINE

## 2018-01-15 PROCEDURE — G8427 DOCREV CUR MEDS BY ELIG CLIN: HCPCS | Performed by: FAMILY MEDICINE

## 2018-01-15 PROCEDURE — 3046F HEMOGLOBIN A1C LEVEL >9.0%: CPT | Performed by: FAMILY MEDICINE

## 2018-01-15 RX ORDER — PREDNISONE 10 MG/1
TABLET ORAL
Qty: 30 TABLET | Refills: 0 | Status: SHIPPED | OUTPATIENT
Start: 2018-01-15 | End: 2018-01-29

## 2018-01-15 RX ORDER — OSELTAMIVIR PHOSPHATE 75 MG/1
75 CAPSULE ORAL 2 TIMES DAILY
Qty: 10 CAPSULE | Refills: 0 | Status: SHIPPED | OUTPATIENT
Start: 2018-01-15 | End: 2018-01-20

## 2018-01-15 RX ORDER — CEFTRIAXONE 1 G/1
1 INJECTION, POWDER, FOR SOLUTION INTRAMUSCULAR; INTRAVENOUS ONCE
Status: COMPLETED | OUTPATIENT
Start: 2018-01-15 | End: 2018-01-15

## 2018-01-15 RX ORDER — CEFDINIR 300 MG/1
300 CAPSULE ORAL 2 TIMES DAILY
Qty: 20 CAPSULE | Refills: 0 | Status: SHIPPED | OUTPATIENT
Start: 2018-01-15 | End: 2018-01-25

## 2018-01-15 RX ORDER — AZITHROMYCIN 250 MG/1
TABLET, FILM COATED ORAL
Qty: 6 TABLET | Refills: 0 | Status: CANCELLED | OUTPATIENT
Start: 2018-01-15

## 2018-01-15 RX ADMIN — CEFTRIAXONE 1 G: 1 INJECTION, POWDER, FOR SOLUTION INTRAMUSCULAR; INTRAVENOUS at 11:47

## 2018-01-15 ASSESSMENT — ENCOUNTER SYMPTOMS
COUGH: 1
SHORTNESS OF BREATH: 1
WHEEZING: 1
SORE THROAT: 1

## 2018-01-26 RX ORDER — ALLOPURINOL 300 MG/1
TABLET ORAL
Qty: 90 TABLET | Refills: 1 | Status: SHIPPED | OUTPATIENT
Start: 2018-01-26 | End: 2018-07-29 | Stop reason: SDUPTHER

## 2018-01-26 RX ORDER — HYDROCHLOROTHIAZIDE 25 MG/1
TABLET ORAL
Qty: 90 TABLET | Refills: 1 | Status: SHIPPED | OUTPATIENT
Start: 2018-01-26 | End: 2018-08-06 | Stop reason: SDUPTHER

## 2018-02-21 RX ORDER — METFORMIN HYDROCHLORIDE 500 MG/1
TABLET, EXTENDED RELEASE ORAL
Qty: 360 TABLET | Refills: 1 | Status: SHIPPED | OUTPATIENT
Start: 2018-02-21 | End: 2018-08-14 | Stop reason: SDUPTHER

## 2018-03-05 RX ORDER — OMEPRAZOLE 20 MG/1
CAPSULE, DELAYED RELEASE ORAL
Qty: 90 CAPSULE | Refills: 1 | Status: SHIPPED | OUTPATIENT
Start: 2018-03-05 | End: 2019-01-18

## 2018-03-19 DIAGNOSIS — E11.22 TYPE 2 DIABETES MELLITUS WITH STAGE 2 CHRONIC KIDNEY DISEASE, WITHOUT LONG-TERM CURRENT USE OF INSULIN (HCC): ICD-10-CM

## 2018-03-19 DIAGNOSIS — E55.9 VITAMIN D DEFICIENCY: ICD-10-CM

## 2018-03-19 DIAGNOSIS — D64.9 ANEMIA, UNSPECIFIED TYPE: ICD-10-CM

## 2018-03-19 DIAGNOSIS — N18.2 TYPE 2 DIABETES MELLITUS WITH STAGE 2 CHRONIC KIDNEY DISEASE, WITHOUT LONG-TERM CURRENT USE OF INSULIN (HCC): ICD-10-CM

## 2018-03-19 LAB
ALBUMIN SERPL-MCNC: 3.8 G/DL (ref 3.9–4.9)
ALP BLD-CCNC: 93 U/L (ref 35–104)
ALT SERPL-CCNC: 10 U/L (ref 0–41)
ANION GAP SERPL CALCULATED.3IONS-SCNC: 13 MEQ/L (ref 7–13)
AST SERPL-CCNC: 14 U/L (ref 0–40)
BASOPHILS ABSOLUTE: 0.1 K/UL (ref 0–0.2)
BASOPHILS RELATIVE PERCENT: 1.2 %
BILIRUB SERPL-MCNC: 0.4 MG/DL (ref 0–1.2)
BUN BLDV-MCNC: 25 MG/DL (ref 8–23)
CALCIUM SERPL-MCNC: 9.7 MG/DL (ref 8.6–10.2)
CHLORIDE BLD-SCNC: 102 MEQ/L (ref 98–107)
CHOLESTEROL, TOTAL: 117 MG/DL (ref 0–199)
CO2: 28 MEQ/L (ref 22–29)
CREAT SERPL-MCNC: 1.01 MG/DL (ref 0.7–1.2)
EOSINOPHILS ABSOLUTE: 0.4 K/UL (ref 0–0.7)
EOSINOPHILS RELATIVE PERCENT: 6.6 %
GFR AFRICAN AMERICAN: >60
GFR NON-AFRICAN AMERICAN: >60
GLOBULIN: 2.7 G/DL (ref 2.3–3.5)
GLUCOSE BLD-MCNC: 137 MG/DL (ref 74–109)
HBA1C MFR BLD: 7.9 % (ref 4.8–5.9)
HCT VFR BLD CALC: 36.2 % (ref 42–52)
HDLC SERPL-MCNC: 36 MG/DL (ref 40–59)
HEMOGLOBIN: 11.8 G/DL (ref 14–18)
LDL CHOLESTEROL CALCULATED: 63 MG/DL (ref 0–129)
LYMPHOCYTES ABSOLUTE: 2.1 K/UL (ref 1–4.8)
LYMPHOCYTES RELATIVE PERCENT: 36.4 %
MCH RBC QN AUTO: 31.7 PG (ref 27–31.3)
MCHC RBC AUTO-ENTMCNC: 32.7 % (ref 33–37)
MCV RBC AUTO: 96.7 FL (ref 80–100)
MONOCYTES ABSOLUTE: 0.9 K/UL (ref 0.2–0.8)
MONOCYTES RELATIVE PERCENT: 15.8 %
NEUTROPHILS ABSOLUTE: 2.3 K/UL (ref 1.4–6.5)
NEUTROPHILS RELATIVE PERCENT: 40 %
PDW BLD-RTO: 15.8 % (ref 11.5–14.5)
PLATELET # BLD: 244 K/UL (ref 130–400)
POTASSIUM SERPL-SCNC: 4.1 MEQ/L (ref 3.5–5.1)
RBC # BLD: 3.74 M/UL (ref 4.7–6.1)
SODIUM BLD-SCNC: 143 MEQ/L (ref 132–144)
TOTAL PROTEIN: 6.5 G/DL (ref 6.4–8.1)
TRIGL SERPL-MCNC: 92 MG/DL (ref 0–200)
VITAMIN D 25-HYDROXY: 34.5 NG/ML (ref 30–100)
WBC # BLD: 5.8 K/UL (ref 4.8–10.8)

## 2018-03-20 ENCOUNTER — OFFICE VISIT (OUTPATIENT)
Dept: PRIMARY CARE CLINIC | Age: 73
End: 2018-03-20
Payer: MEDICARE

## 2018-03-20 VITALS
SYSTOLIC BLOOD PRESSURE: 138 MMHG | DIASTOLIC BLOOD PRESSURE: 86 MMHG | WEIGHT: 223 LBS | HEART RATE: 54 BPM | TEMPERATURE: 96.6 F | HEIGHT: 73 IN | OXYGEN SATURATION: 96 % | RESPIRATION RATE: 14 BRPM | BODY MASS INDEX: 29.55 KG/M2

## 2018-03-20 DIAGNOSIS — K21.9 GASTROESOPHAGEAL REFLUX DISEASE WITHOUT ESOPHAGITIS: ICD-10-CM

## 2018-03-20 DIAGNOSIS — N18.2 TYPE 2 DIABETES MELLITUS WITH STAGE 2 CHRONIC KIDNEY DISEASE, WITHOUT LONG-TERM CURRENT USE OF INSULIN (HCC): ICD-10-CM

## 2018-03-20 DIAGNOSIS — M77.8 TENDONITIS OF SHOULDER, LEFT: ICD-10-CM

## 2018-03-20 DIAGNOSIS — E78.5 HYPERLIPIDEMIA, UNSPECIFIED HYPERLIPIDEMIA TYPE: ICD-10-CM

## 2018-03-20 DIAGNOSIS — E11.22 TYPE 2 DIABETES MELLITUS WITH STAGE 2 CHRONIC KIDNEY DISEASE, WITHOUT LONG-TERM CURRENT USE OF INSULIN (HCC): ICD-10-CM

## 2018-03-20 DIAGNOSIS — E55.9 VITAMIN D DEFICIENCY: ICD-10-CM

## 2018-03-20 DIAGNOSIS — R53.83 FATIGUE, UNSPECIFIED TYPE: ICD-10-CM

## 2018-03-20 DIAGNOSIS — I10 ESSENTIAL HYPERTENSION: Primary | ICD-10-CM

## 2018-03-20 PROCEDURE — 1036F TOBACCO NON-USER: CPT | Performed by: FAMILY MEDICINE

## 2018-03-20 PROCEDURE — 99214 OFFICE O/P EST MOD 30 MIN: CPT | Performed by: FAMILY MEDICINE

## 2018-03-20 PROCEDURE — G8417 CALC BMI ABV UP PARAM F/U: HCPCS | Performed by: FAMILY MEDICINE

## 2018-03-20 PROCEDURE — 4040F PNEUMOC VAC/ADMIN/RCVD: CPT | Performed by: FAMILY MEDICINE

## 2018-03-20 PROCEDURE — G8427 DOCREV CUR MEDS BY ELIG CLIN: HCPCS | Performed by: FAMILY MEDICINE

## 2018-03-20 PROCEDURE — 3045F PR MOST RECENT HEMOGLOBIN A1C LEVEL 7.0-9.0%: CPT | Performed by: FAMILY MEDICINE

## 2018-03-20 PROCEDURE — G8482 FLU IMMUNIZE ORDER/ADMIN: HCPCS | Performed by: FAMILY MEDICINE

## 2018-03-20 PROCEDURE — 3017F COLORECTAL CA SCREEN DOC REV: CPT | Performed by: FAMILY MEDICINE

## 2018-03-20 PROCEDURE — 20610 DRAIN/INJ JOINT/BURSA W/O US: CPT | Performed by: FAMILY MEDICINE

## 2018-03-20 PROCEDURE — 1123F ACP DISCUSS/DSCN MKR DOCD: CPT | Performed by: FAMILY MEDICINE

## 2018-03-20 RX ORDER — TRIAMCINOLONE ACETONIDE 40 MG/ML
80 INJECTION, SUSPENSION INTRA-ARTICULAR; INTRAMUSCULAR ONCE
Status: COMPLETED | OUTPATIENT
Start: 2018-03-20 | End: 2018-03-20

## 2018-03-20 RX ORDER — PANTOPRAZOLE SODIUM 40 MG/1
40 TABLET, DELAYED RELEASE ORAL DAILY
Qty: 30 TABLET | Refills: 3 | Status: SHIPPED | OUTPATIENT
Start: 2018-03-20 | End: 2018-09-20

## 2018-03-20 RX ADMIN — TRIAMCINOLONE ACETONIDE 80 MG: 40 INJECTION, SUSPENSION INTRA-ARTICULAR; INTRAMUSCULAR at 08:22

## 2018-03-20 ASSESSMENT — ENCOUNTER SYMPTOMS
VISUAL CHANGE: 0
BLURRED VISION: 0
COUGH: 0
ABDOMINAL PAIN: 0
SHORTNESS OF BREATH: 0
WHEEZING: 0

## 2018-03-20 NOTE — PROGRESS NOTES
Lucy Waters 71 y.o. male presents today with   Chief Complaint   Patient presents with    Shoulder Pain     patient c/o left shoulder pain x 3 months. patient describes the pain as aching and c/o some limited range of motion.  Diabetes     patient is following up for chronic DM. patient reports his sugar was 114 this morning. patient would like to discuss labs done yesterday.  Discuss Medications     patient reports his insurance will no longer cover prilosec. HPI    Past Medical History:   Diagnosis Date    ? Parotiditis 9/22/2015    ? Parotiditis, ST mass just anterior to Parotid, R 10/2/2015    Allergic rhinitis     Anemia     Lombardi's esophagus     Bronchitis     Colon polyp     Eczema     Hypertension     Mandibular mass 9/22/2015    Osteoarthritis     RAD (reactive airway disease)     Type II or unspecified type diabetes mellitus without mention of complication, not stated as uncontrolled      Patient Active Problem List    Diagnosis Date Noted    Vitamin D deficiency 12/20/2017    H/O lumbosacral spine surgery 09/27/2017    Seasonal allergic rhinitis due to pollen 06/14/2017    Olecranon bursitis of right elbow 03/13/2017    Osteoarthritis of lumbar spine with myelopathy 06/15/2016    Other and unspecified hyperlipidemia 12/16/2015    DM type 2 with diabetic dyslipidemia ([facility] [address] 10/19/2015    Primary osteoarthritis of both knees 10/08/2015    ?  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    Tinea corporis 12/01/2014    Psoriasis 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    Kidney stone 05/10/2013    Hematoma of kidney 05/10/2013    DVT (deep venous thrombosis) (HCC) 04/22/2013    Seborrheic keratosis 04/12/2013    OA (osteoarthritis of spine) 04/08/2013    Radiculopathy of arm 04/08/2013    Radiculopathy of leg 04/08/2013    Tendonitis of shoulder, left 10/15/2012    Neck strain 10/15/2012    AR (allergic rhinitis) 10/15/2012    ARPITA on CPAP 02/12/2012    Anemia 12/04/2011    Lombardi's esophagus     Hypertension     Osteoarthritis     Acquired cyst of kidney 12/31/2007    Calculus of ureter 09/28/2007    Cardiac arrhythmia 09/28/2007    Dysuria 09/28/2007    Hematuria 09/28/2007    Asthma 09/28/2007    Urinary urgency 09/28/2007     Past Surgical History:   Procedure Laterality Date    BACK SURGERY  2007    removal of broken disk low back    BACK SURGERY N/A 9/2013    lower back    CARDIAC CATHETERIZATION      CHOLECYSTECTOMY  2007    CHOLECYSTECTOMY      COLONOSCOPY  11/18/08    DR HATHAWAY    COLONOSCOPY  03/27/14    DR. VELASCO    LITHOTRIPSY  2007    OTHER SURGICAL HISTORY  12/2/2013    LEVATOR ADVANCEMENT BOTH EYES    SPLENECTOMY  1949    UPPER GASTROINTESTINAL ENDOSCOPY  11/18/08    DR HATHAWAY *REPEAT IN 2 YEARS!!    UPPER GASTROINTESTINAL ENDOSCOPY  03/27/14    DR. VELASCO     Family History   Problem Relation Age of Onset    High Blood Pressure Father     Stroke Father     Cancer Mother      Social History     Social History    Marital status:      Spouse name: N/A    Number of children: N/A    Years of education: N/A     Social History Main Topics    Smoking status: Never Smoker    Smokeless tobacco: Never Used    Alcohol use No    Drug use: No    Sexual activity: Not on file     Other Topics Concern    Not on file     Social History Narrative    No narrative on file     Allergies   Allergen Reactions    Diflucan [Fluconazole]        Review of Systems   Constitutional: Negative for fatigue.    Respiratory: Negative for

## 2018-03-20 NOTE — PROGRESS NOTES
Subjective:      Patient ID: Alvino Santos is a 71 y.o. male who presents today for:  Chief Complaint   Patient presents with    Shoulder Pain     patient c/o left shoulder pain x 3 months. patient describes the pain as aching and c/o some limited range of motion.  Diabetes     patient is following up for chronic DM. patient reports his sugar was 114 this morning. patient would like to discuss labs done yesterday.  Discuss Medications     patient reports his insurance will no longer cover prilosec. Shoulder Pain    The pain is present in the left shoulder. This is a chronic problem. The current episode started more than 1 month ago. The problem occurs constantly. The problem has been gradually worsening. The quality of the pain is described as aching. Associated symptoms include an inability to bear weight and a limited range of motion. Pertinent negatives include no fever, itching, joint locking, joint swelling, numbness, stiffness or tingling. The symptoms are aggravated by activity. He has tried nothing for the symptoms. Diabetes   He presents for his follow-up diabetic visit. He has type 2 diabetes mellitus. There are no hypoglycemic associated symptoms. Pertinent negatives for hypoglycemia include no dizziness or headaches. Pertinent negatives for diabetes include no blurred vision, no chest pain, no fatigue, no foot paresthesias, no foot ulcerations, no polydipsia, no polyphagia, no polyuria, no visual change, no weakness and no weight loss. There are no hypoglycemic complications. Risk factors for coronary artery disease include diabetes mellitus, hypertension and male sex. Current diabetic treatment includes oral agent (dual therapy). He is compliant with treatment all of the time. His weight is stable. There is no change in his home blood glucose trend. His overall blood glucose range is 110-130 mg/dl. Eye exam is current.         fu lipids,htn     Chronic,stable    Past Medical leg swelling. Gastrointestinal: Negative for abdominal pain. Endocrine: Negative for polydipsia, polyphagia and polyuria. Musculoskeletal: Negative for stiffness. Left shoulder pain   Skin: Negative for itching. Neurological: Negative for dizziness, tingling, weakness, numbness and headaches. Objective:   /86   Pulse 54   Temp 96.6 °F (35.9 °C) (Tympanic)   Resp 14   Ht 6' 1\" (1.854 m)   Wt 223 lb (101.2 kg)   SpO2 96%   BMI 29.42 kg/m²     Physical Exam      PHYSICAL EXAMINATION:   VITAL SIGNS: are as recorded. GENERAL:  The patient appears well nourished and well-developed, and with normal affect. No acute respiratory distress. Alert and oriented times 3. No skin rashes. HEENT:  TMs normal bilaterally. Canals and ears normal. Pharynx is clear. Extraocular eye motions intact and pain free. Pupils reactive and equally round. Sclerae and conjunctivae clear, normocephalic and atraumatic. RESPIRATORY:  Clear and equal breath sounds with no acute respiratory distress. HEART: Regular rhythm without murmur, rub or gallop. ABDOMEN:  soft, nontender. No masses, guarding or rebound. Normoactive bowel sounds. LOW BACK: No tenderness to palpation of inferior lumbar spine or either sacroiliac joint area. Tender L shoulder  Assessment:     1. Essential hypertension     2. Tendonitis of shoulder, left  WI ARTHROCENTESIS ASPIR&/INJ MAJOR JT/BURSA W/O US    triamcinolone acetonide (KENALOG-40) injection 80 mg   3. Type 2 diabetes mellitus with stage 2 chronic kidney disease, without long-term current use of insulin ([facility])  Hemoglobin A1C   4. Vitamin D deficiency  Vitamin D 25 Hydroxy   5. Fatigue, unspecified type  CBC Auto Differential   6. Hyperlipidemia, unspecified hyperlipidemia type  Comprehensive Metabolic Panel    Lipid Panel   7.  Gastroesophageal reflux disease without esophagitis  pantoprazole (PROTONIX) 40 MG tablet       Plan:      Orders Placed This

## 2018-03-28 RX ORDER — ATORVASTATIN CALCIUM 10 MG/1
TABLET, FILM COATED ORAL
Qty: 90 TABLET | Refills: 1 | Status: SHIPPED | OUTPATIENT
Start: 2018-03-28 | End: 2018-09-25 | Stop reason: SDUPTHER

## 2018-03-28 RX ORDER — LISINOPRIL 40 MG/1
TABLET ORAL
Qty: 90 TABLET | Refills: 1 | Status: SHIPPED | OUTPATIENT
Start: 2018-03-28 | End: 2018-09-25 | Stop reason: SDUPTHER

## 2018-03-28 RX ORDER — NIFEDIPINE 60 MG/1
TABLET, EXTENDED RELEASE ORAL
Qty: 90 TABLET | Refills: 1 | Status: SHIPPED | OUTPATIENT
Start: 2018-03-28 | End: 2018-09-25 | Stop reason: SDUPTHER

## 2018-04-04 RX ORDER — MONTELUKAST SODIUM 10 MG/1
TABLET ORAL
Qty: 90 TABLET | Refills: 3 | Status: SHIPPED | OUTPATIENT
Start: 2018-04-04 | End: 2019-03-22 | Stop reason: SDUPTHER

## 2018-05-09 RX ORDER — PIOGLITAZONEHYDROCHLORIDE 45 MG/1
TABLET ORAL
Qty: 90 TABLET | Refills: 1 | Status: SHIPPED | OUTPATIENT
Start: 2018-05-09 | End: 2018-10-31 | Stop reason: SDUPTHER

## 2018-06-18 DIAGNOSIS — E11.22 TYPE 2 DIABETES MELLITUS WITH STAGE 2 CHRONIC KIDNEY DISEASE, WITHOUT LONG-TERM CURRENT USE OF INSULIN (HCC): ICD-10-CM

## 2018-06-18 DIAGNOSIS — E78.5 HYPERLIPIDEMIA, UNSPECIFIED HYPERLIPIDEMIA TYPE: ICD-10-CM

## 2018-06-18 DIAGNOSIS — E55.9 VITAMIN D DEFICIENCY: ICD-10-CM

## 2018-06-18 DIAGNOSIS — N18.2 TYPE 2 DIABETES MELLITUS WITH STAGE 2 CHRONIC KIDNEY DISEASE, WITHOUT LONG-TERM CURRENT USE OF INSULIN (HCC): ICD-10-CM

## 2018-06-18 DIAGNOSIS — R53.83 FATIGUE, UNSPECIFIED TYPE: ICD-10-CM

## 2018-06-18 LAB
ALBUMIN SERPL-MCNC: 3.6 G/DL (ref 3.9–4.9)
ALP BLD-CCNC: 66 U/L (ref 35–104)
ALT SERPL-CCNC: 10 U/L (ref 0–41)
ANION GAP SERPL CALCULATED.3IONS-SCNC: 17 MEQ/L (ref 7–13)
AST SERPL-CCNC: 14 U/L (ref 0–40)
BASOPHILS ABSOLUTE: 0.1 K/UL (ref 0–0.2)
BASOPHILS RELATIVE PERCENT: 1.4 %
BILIRUB SERPL-MCNC: 0.5 MG/DL (ref 0–1.2)
BUN BLDV-MCNC: 23 MG/DL (ref 8–23)
CALCIUM SERPL-MCNC: 9.7 MG/DL (ref 8.6–10.2)
CHLORIDE BLD-SCNC: 102 MEQ/L (ref 98–107)
CHOLESTEROL, TOTAL: 109 MG/DL (ref 0–199)
CO2: 24 MEQ/L (ref 22–29)
CREAT SERPL-MCNC: 0.91 MG/DL (ref 0.7–1.2)
EOSINOPHILS ABSOLUTE: 0.2 K/UL (ref 0–0.7)
EOSINOPHILS RELATIVE PERCENT: 4.7 %
GFR AFRICAN AMERICAN: >60
GFR NON-AFRICAN AMERICAN: >60
GLOBULIN: 3.3 G/DL (ref 2.3–3.5)
GLUCOSE BLD-MCNC: 109 MG/DL (ref 74–109)
HBA1C MFR BLD: 7.6 % (ref 4.8–5.9)
HCT VFR BLD CALC: 35.7 % (ref 42–52)
HDLC SERPL-MCNC: 38 MG/DL (ref 40–59)
HEMOGLOBIN: 11.8 G/DL (ref 14–18)
LDL CHOLESTEROL CALCULATED: 57 MG/DL (ref 0–129)
LYMPHOCYTES ABSOLUTE: 1.9 K/UL (ref 1–4.8)
LYMPHOCYTES RELATIVE PERCENT: 37.9 %
MCH RBC QN AUTO: 32.3 PG (ref 27–31.3)
MCHC RBC AUTO-ENTMCNC: 33.1 % (ref 33–37)
MCV RBC AUTO: 97.6 FL (ref 80–100)
MONOCYTES ABSOLUTE: 0.7 K/UL (ref 0.2–0.8)
MONOCYTES RELATIVE PERCENT: 13.6 %
NEUTROPHILS ABSOLUTE: 2.1 K/UL (ref 1.4–6.5)
NEUTROPHILS RELATIVE PERCENT: 42.4 %
PDW BLD-RTO: 16 % (ref 11.5–14.5)
PLATELET # BLD: 309 K/UL (ref 130–400)
POTASSIUM SERPL-SCNC: 4.1 MEQ/L (ref 3.5–5.1)
RBC # BLD: 3.66 M/UL (ref 4.7–6.1)
SODIUM BLD-SCNC: 143 MEQ/L (ref 132–144)
TOTAL PROTEIN: 6.9 G/DL (ref 6.4–8.1)
TRIGL SERPL-MCNC: 71 MG/DL (ref 0–200)
VITAMIN D 25-HYDROXY: 31 NG/ML (ref 30–100)
WBC # BLD: 5 K/UL (ref 4.8–10.8)

## 2018-06-20 ENCOUNTER — OFFICE VISIT (OUTPATIENT)
Dept: PRIMARY CARE CLINIC | Age: 73
End: 2018-06-20
Payer: MEDICARE

## 2018-06-20 VITALS
RESPIRATION RATE: 16 BRPM | BODY MASS INDEX: 29.16 KG/M2 | WEIGHT: 220 LBS | DIASTOLIC BLOOD PRESSURE: 80 MMHG | SYSTOLIC BLOOD PRESSURE: 122 MMHG | HEIGHT: 73 IN | OXYGEN SATURATION: 98 % | HEART RATE: 56 BPM | TEMPERATURE: 96.7 F

## 2018-06-20 DIAGNOSIS — K21.9 GASTROESOPHAGEAL REFLUX DISEASE WITHOUT ESOPHAGITIS: ICD-10-CM

## 2018-06-20 DIAGNOSIS — E78.49 OTHER HYPERLIPIDEMIA: ICD-10-CM

## 2018-06-20 DIAGNOSIS — E55.9 VITAMIN D DEFICIENCY: ICD-10-CM

## 2018-06-20 DIAGNOSIS — N18.2 TYPE 2 DIABETES MELLITUS WITH STAGE 2 CHRONIC KIDNEY DISEASE, WITHOUT LONG-TERM CURRENT USE OF INSULIN (HCC): Primary | ICD-10-CM

## 2018-06-20 DIAGNOSIS — J30.2 CHRONIC SEASONAL ALLERGIC RHINITIS, UNSPECIFIED TRIGGER: ICD-10-CM

## 2018-06-20 DIAGNOSIS — E11.22 TYPE 2 DIABETES MELLITUS WITH STAGE 2 CHRONIC KIDNEY DISEASE, WITHOUT LONG-TERM CURRENT USE OF INSULIN (HCC): Primary | ICD-10-CM

## 2018-06-20 DIAGNOSIS — I10 ESSENTIAL HYPERTENSION: ICD-10-CM

## 2018-06-20 PROCEDURE — 96372 THER/PROPH/DIAG INJ SC/IM: CPT | Performed by: FAMILY MEDICINE

## 2018-06-20 PROCEDURE — 2022F DILAT RTA XM EVC RTNOPTHY: CPT | Performed by: FAMILY MEDICINE

## 2018-06-20 PROCEDURE — 3045F PR MOST RECENT HEMOGLOBIN A1C LEVEL 7.0-9.0%: CPT | Performed by: FAMILY MEDICINE

## 2018-06-20 PROCEDURE — 3017F COLORECTAL CA SCREEN DOC REV: CPT | Performed by: FAMILY MEDICINE

## 2018-06-20 PROCEDURE — 4040F PNEUMOC VAC/ADMIN/RCVD: CPT | Performed by: FAMILY MEDICINE

## 2018-06-20 PROCEDURE — 1036F TOBACCO NON-USER: CPT | Performed by: FAMILY MEDICINE

## 2018-06-20 PROCEDURE — G8427 DOCREV CUR MEDS BY ELIG CLIN: HCPCS | Performed by: FAMILY MEDICINE

## 2018-06-20 PROCEDURE — 99214 OFFICE O/P EST MOD 30 MIN: CPT | Performed by: FAMILY MEDICINE

## 2018-06-20 PROCEDURE — G8417 CALC BMI ABV UP PARAM F/U: HCPCS | Performed by: FAMILY MEDICINE

## 2018-06-20 PROCEDURE — 1123F ACP DISCUSS/DSCN MKR DOCD: CPT | Performed by: FAMILY MEDICINE

## 2018-06-20 RX ORDER — TRIAMCINOLONE ACETONIDE 40 MG/ML
80 INJECTION, SUSPENSION INTRA-ARTICULAR; INTRAMUSCULAR ONCE
Status: COMPLETED | OUTPATIENT
Start: 2018-06-20 | End: 2018-06-20

## 2018-06-20 RX ADMIN — TRIAMCINOLONE ACETONIDE 80 MG: 40 INJECTION, SUSPENSION INTRA-ARTICULAR; INTRAMUSCULAR at 09:11

## 2018-06-20 ASSESSMENT — ENCOUNTER SYMPTOMS
BLURRED VISION: 0
SHORTNESS OF BREATH: 1
ORTHOPNEA: 0
CHEST TIGHTNESS: 0

## 2018-06-20 ASSESSMENT — PATIENT HEALTH QUESTIONNAIRE - PHQ9
SUM OF ALL RESPONSES TO PHQ QUESTIONS 1-9: 0
SUM OF ALL RESPONSES TO PHQ9 QUESTIONS 1 & 2: 0
1. LITTLE INTEREST OR PLEASURE IN DOING THINGS: 0
2. FEELING DOWN, DEPRESSED OR HOPELESS: 0

## 2018-07-30 RX ORDER — ALLOPURINOL 300 MG/1
TABLET ORAL
Qty: 90 TABLET | Refills: 1 | Status: SHIPPED | OUTPATIENT
Start: 2018-07-30 | End: 2019-01-26 | Stop reason: SDUPTHER

## 2018-08-06 RX ORDER — HYDROCHLOROTHIAZIDE 25 MG/1
TABLET ORAL
Qty: 90 TABLET | Refills: 1 | Status: SHIPPED | OUTPATIENT
Start: 2018-08-06 | End: 2019-01-27 | Stop reason: SDUPTHER

## 2018-08-07 LAB — DIABETIC RETINOPATHY: NEGATIVE

## 2018-08-14 RX ORDER — METFORMIN HYDROCHLORIDE 500 MG/1
TABLET, EXTENDED RELEASE ORAL
Qty: 360 TABLET | Refills: 1 | Status: SHIPPED | OUTPATIENT
Start: 2018-08-14 | End: 2019-02-10 | Stop reason: SDUPTHER

## 2018-09-18 DIAGNOSIS — E11.22 TYPE 2 DIABETES MELLITUS WITH STAGE 2 CHRONIC KIDNEY DISEASE, WITHOUT LONG-TERM CURRENT USE OF INSULIN (HCC): ICD-10-CM

## 2018-09-18 DIAGNOSIS — E78.49 OTHER HYPERLIPIDEMIA: ICD-10-CM

## 2018-09-18 DIAGNOSIS — I10 ESSENTIAL HYPERTENSION: ICD-10-CM

## 2018-09-18 DIAGNOSIS — E55.9 VITAMIN D DEFICIENCY: ICD-10-CM

## 2018-09-18 DIAGNOSIS — N18.2 TYPE 2 DIABETES MELLITUS WITH STAGE 2 CHRONIC KIDNEY DISEASE, WITHOUT LONG-TERM CURRENT USE OF INSULIN (HCC): ICD-10-CM

## 2018-09-18 LAB
ALBUMIN SERPL-MCNC: 4.2 G/DL (ref 3.9–4.9)
ALP BLD-CCNC: 81 U/L (ref 35–104)
ALT SERPL-CCNC: 8 U/L (ref 0–41)
ANION GAP SERPL CALCULATED.3IONS-SCNC: 14 MEQ/L (ref 7–13)
AST SERPL-CCNC: 12 U/L (ref 0–40)
BILIRUB SERPL-MCNC: 0.4 MG/DL (ref 0–1.2)
BUN BLDV-MCNC: 26 MG/DL (ref 8–23)
CALCIUM SERPL-MCNC: 10 MG/DL (ref 8.6–10.2)
CHLORIDE BLD-SCNC: 100 MEQ/L (ref 98–107)
CHOLESTEROL, TOTAL: 106 MG/DL (ref 0–199)
CO2: 27 MEQ/L (ref 22–29)
CREAT SERPL-MCNC: 1.47 MG/DL (ref 0.7–1.2)
GFR AFRICAN AMERICAN: 57
GFR NON-AFRICAN AMERICAN: 47.1
GLOBULIN: 2.9 G/DL (ref 2.3–3.5)
GLUCOSE BLD-MCNC: 104 MG/DL (ref 74–109)
HBA1C MFR BLD: 7.2 % (ref 4.8–5.9)
HCT VFR BLD CALC: 38.5 % (ref 42–52)
HDLC SERPL-MCNC: 35 MG/DL (ref 40–59)
HEMOGLOBIN: 12.7 G/DL (ref 14–18)
LDL CHOLESTEROL CALCULATED: 54 MG/DL (ref 0–129)
MCH RBC QN AUTO: 32.4 PG (ref 27–31.3)
MCHC RBC AUTO-ENTMCNC: 33 % (ref 33–37)
MCV RBC AUTO: 98.2 FL (ref 80–100)
PDW BLD-RTO: 15.7 % (ref 11.5–14.5)
PLATELET # BLD: 249 K/UL (ref 130–400)
POTASSIUM SERPL-SCNC: 3.9 MEQ/L (ref 3.5–5.1)
RBC # BLD: 3.92 M/UL (ref 4.7–6.1)
SODIUM BLD-SCNC: 141 MEQ/L (ref 132–144)
TOTAL PROTEIN: 7.1 G/DL (ref 6.4–8.1)
TRIGL SERPL-MCNC: 83 MG/DL (ref 0–200)
VITAMIN D 25-HYDROXY: 33.2 NG/ML (ref 30–100)
WBC # BLD: 5.7 K/UL (ref 4.8–10.8)

## 2018-09-20 ENCOUNTER — OFFICE VISIT (OUTPATIENT)
Dept: PRIMARY CARE CLINIC | Age: 73
End: 2018-09-20
Payer: MEDICARE

## 2018-09-20 VITALS
BODY MASS INDEX: 28.63 KG/M2 | SYSTOLIC BLOOD PRESSURE: 126 MMHG | TEMPERATURE: 97.6 F | DIASTOLIC BLOOD PRESSURE: 74 MMHG | RESPIRATION RATE: 16 BRPM | HEIGHT: 73 IN | HEART RATE: 53 BPM | OXYGEN SATURATION: 97 % | WEIGHT: 216 LBS

## 2018-09-20 DIAGNOSIS — R53.83 FATIGUE, UNSPECIFIED TYPE: ICD-10-CM

## 2018-09-20 DIAGNOSIS — Z23 NEED FOR INFLUENZA VACCINATION: ICD-10-CM

## 2018-09-20 DIAGNOSIS — J45.40 MODERATE PERSISTENT ASTHMA WITHOUT COMPLICATION: ICD-10-CM

## 2018-09-20 DIAGNOSIS — I10 ESSENTIAL HYPERTENSION: ICD-10-CM

## 2018-09-20 DIAGNOSIS — E55.9 VITAMIN D DEFICIENCY: ICD-10-CM

## 2018-09-20 DIAGNOSIS — E78.5 DM TYPE 2 WITH DIABETIC DYSLIPIDEMIA (HCC): Primary | ICD-10-CM

## 2018-09-20 DIAGNOSIS — Z12.5 PROSTATE CANCER SCREENING: ICD-10-CM

## 2018-09-20 DIAGNOSIS — E11.69 DM TYPE 2 WITH DIABETIC DYSLIPIDEMIA (HCC): Primary | ICD-10-CM

## 2018-09-20 DIAGNOSIS — E78.5 HYPERLIPIDEMIA, UNSPECIFIED HYPERLIPIDEMIA TYPE: ICD-10-CM

## 2018-09-20 PROCEDURE — 1036F TOBACCO NON-USER: CPT | Performed by: FAMILY MEDICINE

## 2018-09-20 PROCEDURE — 2022F DILAT RTA XM EVC RTNOPTHY: CPT | Performed by: FAMILY MEDICINE

## 2018-09-20 PROCEDURE — G8417 CALC BMI ABV UP PARAM F/U: HCPCS | Performed by: FAMILY MEDICINE

## 2018-09-20 PROCEDURE — G8427 DOCREV CUR MEDS BY ELIG CLIN: HCPCS | Performed by: FAMILY MEDICINE

## 2018-09-20 PROCEDURE — G0008 ADMIN INFLUENZA VIRUS VAC: HCPCS | Performed by: FAMILY MEDICINE

## 2018-09-20 PROCEDURE — 1101F PT FALLS ASSESS-DOCD LE1/YR: CPT | Performed by: FAMILY MEDICINE

## 2018-09-20 PROCEDURE — 1123F ACP DISCUSS/DSCN MKR DOCD: CPT | Performed by: FAMILY MEDICINE

## 2018-09-20 PROCEDURE — 4040F PNEUMOC VAC/ADMIN/RCVD: CPT | Performed by: FAMILY MEDICINE

## 2018-09-20 PROCEDURE — 90662 IIV NO PRSV INCREASED AG IM: CPT | Performed by: FAMILY MEDICINE

## 2018-09-20 PROCEDURE — 3017F COLORECTAL CA SCREEN DOC REV: CPT | Performed by: FAMILY MEDICINE

## 2018-09-20 PROCEDURE — 99214 OFFICE O/P EST MOD 30 MIN: CPT | Performed by: FAMILY MEDICINE

## 2018-09-20 PROCEDURE — 3045F PR MOST RECENT HEMOGLOBIN A1C LEVEL 7.0-9.0%: CPT | Performed by: FAMILY MEDICINE

## 2018-09-20 RX ORDER — ALBUTEROL SULFATE 90 UG/1
2 AEROSOL, METERED RESPIRATORY (INHALATION) EVERY 6 HOURS PRN
Qty: 1 INHALER | Refills: 3 | Status: ON HOLD | OUTPATIENT
Start: 2018-09-20 | End: 2021-04-16

## 2018-09-20 RX ORDER — FLUTICASONE FUROATE AND VILANTEROL 200; 25 UG/1; UG/1
POWDER RESPIRATORY (INHALATION)
Qty: 2 EACH | Refills: 0 | COMMUNITY
Start: 2018-09-20 | End: 2018-10-18 | Stop reason: SDUPTHER

## 2018-09-20 RX ORDER — NITROGLYCERIN 0.4 MG/1
0.4 TABLET SUBLINGUAL EVERY 5 MIN PRN
Qty: 25 TABLET | Refills: 3 | Status: SHIPPED | OUTPATIENT
Start: 2018-09-20

## 2018-09-20 ASSESSMENT — ENCOUNTER SYMPTOMS
CHEST TIGHTNESS: 0
VISUAL CHANGE: 0
HEMOPTYSIS: 0
HOARSE VOICE: 0
COUGH: 0
BLURRED VISION: 0
SHORTNESS OF BREATH: 1
WHEEZING: 0
DIFFICULTY BREATHING: 0
SPUTUM PRODUCTION: 0

## 2018-09-25 RX ORDER — NIFEDIPINE 60 MG/1
TABLET, EXTENDED RELEASE ORAL
Qty: 90 TABLET | Refills: 1 | Status: SHIPPED | OUTPATIENT
Start: 2018-09-25 | End: 2019-03-22 | Stop reason: SDUPTHER

## 2018-09-25 RX ORDER — LISINOPRIL 40 MG/1
TABLET ORAL
Qty: 90 TABLET | Refills: 1 | Status: SHIPPED | OUTPATIENT
Start: 2018-09-25 | End: 2019-03-22 | Stop reason: SDUPTHER

## 2018-09-25 RX ORDER — ATORVASTATIN CALCIUM 10 MG/1
TABLET, FILM COATED ORAL
Qty: 90 TABLET | Refills: 1 | Status: SHIPPED | OUTPATIENT
Start: 2018-09-25 | End: 2019-03-22 | Stop reason: SDUPTHER

## 2018-10-10 RX ORDER — RIVAROXABAN 20 MG/1
TABLET, FILM COATED ORAL
Qty: 90 TABLET | Refills: 1 | Status: SHIPPED | OUTPATIENT
Start: 2018-10-10

## 2018-10-16 DIAGNOSIS — Z12.5 PROSTATE CANCER SCREENING: ICD-10-CM

## 2018-10-16 DIAGNOSIS — E55.9 VITAMIN D DEFICIENCY: ICD-10-CM

## 2018-10-16 DIAGNOSIS — I10 ESSENTIAL HYPERTENSION: ICD-10-CM

## 2018-10-16 DIAGNOSIS — R53.83 FATIGUE, UNSPECIFIED TYPE: ICD-10-CM

## 2018-10-16 DIAGNOSIS — E78.5 DM TYPE 2 WITH DIABETIC DYSLIPIDEMIA (HCC): ICD-10-CM

## 2018-10-16 DIAGNOSIS — E78.5 HYPERLIPIDEMIA, UNSPECIFIED HYPERLIPIDEMIA TYPE: ICD-10-CM

## 2018-10-16 DIAGNOSIS — E11.69 DM TYPE 2 WITH DIABETIC DYSLIPIDEMIA (HCC): ICD-10-CM

## 2018-10-16 LAB
ALBUMIN SERPL-MCNC: 4 G/DL (ref 3.9–4.9)
ALP BLD-CCNC: 78 U/L (ref 35–104)
ALT SERPL-CCNC: 10 U/L (ref 0–41)
ANION GAP SERPL CALCULATED.3IONS-SCNC: 15 MEQ/L (ref 7–13)
AST SERPL-CCNC: 15 U/L (ref 0–40)
BILIRUB SERPL-MCNC: 0.5 MG/DL (ref 0–1.2)
BUN BLDV-MCNC: 25 MG/DL (ref 8–23)
CALCIUM SERPL-MCNC: 9.9 MG/DL (ref 8.6–10.2)
CHLORIDE BLD-SCNC: 101 MEQ/L (ref 98–107)
CHOLESTEROL, TOTAL: 105 MG/DL (ref 0–199)
CO2: 27 MEQ/L (ref 22–29)
CREAT SERPL-MCNC: 1.32 MG/DL (ref 0.7–1.2)
GFR AFRICAN AMERICAN: >60
GFR NON-AFRICAN AMERICAN: 53.3
GLOBULIN: 3.2 G/DL (ref 2.3–3.5)
GLUCOSE BLD-MCNC: 111 MG/DL (ref 74–109)
HBA1C MFR BLD: 7.2 % (ref 4.8–5.9)
HCT VFR BLD CALC: 39.1 % (ref 42–52)
HDLC SERPL-MCNC: 41 MG/DL (ref 40–59)
HEMOGLOBIN: 12.8 G/DL (ref 14–18)
LDL CHOLESTEROL CALCULATED: 44 MG/DL (ref 0–129)
MCH RBC QN AUTO: 32.1 PG (ref 27–31.3)
MCHC RBC AUTO-ENTMCNC: 32.6 % (ref 33–37)
MCV RBC AUTO: 98.5 FL (ref 80–100)
PDW BLD-RTO: 15.5 % (ref 11.5–14.5)
PLATELET # BLD: 243 K/UL (ref 130–400)
POTASSIUM SERPL-SCNC: 3.5 MEQ/L (ref 3.5–5.1)
PROSTATE SPECIFIC ANTIGEN: 1.76 NG/ML (ref 0–6.22)
RBC # BLD: 3.97 M/UL (ref 4.7–6.1)
SODIUM BLD-SCNC: 143 MEQ/L (ref 132–144)
T4 TOTAL: 8.1 UG/DL (ref 4.5–11.7)
TOTAL PROTEIN: 7.2 G/DL (ref 6.4–8.1)
TRIGL SERPL-MCNC: 101 MG/DL (ref 0–200)
TSH SERPL DL<=0.05 MIU/L-ACNC: 1.97 UIU/ML (ref 0.27–4.2)
VITAMIN D 25-HYDROXY: 33.7 NG/ML (ref 30–100)
WBC # BLD: 5.7 K/UL (ref 4.8–10.8)

## 2018-10-18 ENCOUNTER — OFFICE VISIT (OUTPATIENT)
Dept: PRIMARY CARE CLINIC | Age: 73
End: 2018-10-18
Payer: MEDICARE

## 2018-10-18 VITALS
RESPIRATION RATE: 16 BRPM | SYSTOLIC BLOOD PRESSURE: 130 MMHG | WEIGHT: 214 LBS | BODY MASS INDEX: 28.36 KG/M2 | OXYGEN SATURATION: 93 % | HEIGHT: 73 IN | DIASTOLIC BLOOD PRESSURE: 60 MMHG | HEART RATE: 53 BPM

## 2018-10-18 DIAGNOSIS — E78.49 OTHER HYPERLIPIDEMIA: ICD-10-CM

## 2018-10-18 DIAGNOSIS — J45.40 MODERATE PERSISTENT ASTHMA WITHOUT COMPLICATION: ICD-10-CM

## 2018-10-18 DIAGNOSIS — E78.5 DM TYPE 2 WITH DIABETIC DYSLIPIDEMIA (HCC): Primary | ICD-10-CM

## 2018-10-18 DIAGNOSIS — E11.69 DM TYPE 2 WITH DIABETIC DYSLIPIDEMIA (HCC): Primary | ICD-10-CM

## 2018-10-18 DIAGNOSIS — E55.9 VITAMIN D DEFICIENCY: ICD-10-CM

## 2018-10-18 DIAGNOSIS — I10 ESSENTIAL HYPERTENSION: ICD-10-CM

## 2018-10-18 LAB — T3 UPTAKE: 33 % (ref 28–41)

## 2018-10-18 PROCEDURE — 1101F PT FALLS ASSESS-DOCD LE1/YR: CPT | Performed by: FAMILY MEDICINE

## 2018-10-18 PROCEDURE — G8427 DOCREV CUR MEDS BY ELIG CLIN: HCPCS | Performed by: FAMILY MEDICINE

## 2018-10-18 PROCEDURE — G8417 CALC BMI ABV UP PARAM F/U: HCPCS | Performed by: FAMILY MEDICINE

## 2018-10-18 PROCEDURE — 3045F PR MOST RECENT HEMOGLOBIN A1C LEVEL 7.0-9.0%: CPT | Performed by: FAMILY MEDICINE

## 2018-10-18 PROCEDURE — 1123F ACP DISCUSS/DSCN MKR DOCD: CPT | Performed by: FAMILY MEDICINE

## 2018-10-18 PROCEDURE — 2022F DILAT RTA XM EVC RTNOPTHY: CPT | Performed by: FAMILY MEDICINE

## 2018-10-18 PROCEDURE — 1036F TOBACCO NON-USER: CPT | Performed by: FAMILY MEDICINE

## 2018-10-18 PROCEDURE — 3017F COLORECTAL CA SCREEN DOC REV: CPT | Performed by: FAMILY MEDICINE

## 2018-10-18 PROCEDURE — 4040F PNEUMOC VAC/ADMIN/RCVD: CPT | Performed by: FAMILY MEDICINE

## 2018-10-18 PROCEDURE — 99214 OFFICE O/P EST MOD 30 MIN: CPT | Performed by: FAMILY MEDICINE

## 2018-10-18 PROCEDURE — G8482 FLU IMMUNIZE ORDER/ADMIN: HCPCS | Performed by: FAMILY MEDICINE

## 2018-10-18 RX ORDER — FLUTICASONE FUROATE AND VILANTEROL 200; 25 UG/1; UG/1
POWDER RESPIRATORY (INHALATION)
Qty: 1 EACH | Refills: 0 | COMMUNITY
Start: 2018-10-18 | End: 2021-03-19

## 2018-10-18 RX ORDER — FLUTICASONE FUROATE AND VILANTEROL 200; 25 UG/1; UG/1
POWDER RESPIRATORY (INHALATION)
Qty: 3 EACH | Refills: 1 | Status: SHIPPED | OUTPATIENT
Start: 2018-10-18 | End: 2019-01-14 | Stop reason: SDUPTHER

## 2018-10-18 ASSESSMENT — ENCOUNTER SYMPTOMS
CHEST TIGHTNESS: 0
SHORTNESS OF BREATH: 1
WHEEZING: 1
BLURRED VISION: 0

## 2018-10-18 NOTE — PROGRESS NOTES
of breath (with exertion) and wheezing (intermittent ). Negative for chest tightness. Cardiovascular: Negative for chest pain, palpitations and leg swelling. Neurological: Negative for dizziness and headaches. Objective:   /60 (Site: Right Upper Arm, Position: Sitting, Cuff Size: Medium Adult)   Pulse 53   Resp 16   Ht 6' 1\" (1.854 m)   Wt 214 lb (97.1 kg)   SpO2 93%   BMI 28.23 kg/m²     Physical Exam      PHYSICAL EXAMINATION:   VITAL SIGNS: are as recorded. GENERAL:  The patient appears well nourished and well-developed, and with normal affect. No acute respiratory distress. Alert and oriented times 3. No skin rashes. HEENT:  TMs normal bilaterally. Canals and ears normal. Pharynx is clear. Extraocular eye motions intact and pain free. Pupils reactive and equally round. Sclerae and conjunctivae clear, normocephalic and atraumatic. RESPIRATORY:  Clear and equal breath sounds with no acute respiratory distress. HEART: Regular rhythm without murmur, rub or gallop. ABDOMEN:  soft, nontender. No masses, guarding or rebound. Normoactive bowel sounds. NECK: No masses or adenopathy palpable. No bruits heard. No asymmetry visible. LOW BACK: No tenderness to palpation of inferior lumbar spine or either sacroiliac joint area. Assessment:      Diagnosis Orders   1. DM type 2 with diabetic dyslipidemia (HCC)  Hemoglobin A1C   2. Other hyperlipidemia  Lipid Panel   3. Essential hypertension  CBC    Comprehensive Metabolic Panel   4. Vitamin D deficiency  Vitamin D 25 Hydroxy   5.  Moderate persistent asthma without complication  Fluticasone Furoate-Vilanterol (BREO ELLIPTA) 200-25 MCG/INH AEPB    Fluticasone Furoate-Vilanterol (BREO ELLIPTA) 200-25 MCG/INH AEPB       Plan:      Orders Placed This Encounter   Procedures    CBC     Standing Status:   Future     Standing Expiration Date:   10/18/2019    Comprehensive Metabolic Panel     Standing Status:   Future

## 2018-10-31 RX ORDER — PIOGLITAZONEHYDROCHLORIDE 45 MG/1
TABLET ORAL
Qty: 90 TABLET | Refills: 1 | Status: SHIPPED | OUTPATIENT
Start: 2018-10-31

## 2019-01-14 DIAGNOSIS — J45.40 MODERATE PERSISTENT ASTHMA WITHOUT COMPLICATION: ICD-10-CM

## 2019-01-16 DIAGNOSIS — E78.49 OTHER HYPERLIPIDEMIA: ICD-10-CM

## 2019-01-16 DIAGNOSIS — E55.9 VITAMIN D DEFICIENCY: ICD-10-CM

## 2019-01-16 DIAGNOSIS — E78.5 DM TYPE 2 WITH DIABETIC DYSLIPIDEMIA (HCC): ICD-10-CM

## 2019-01-16 DIAGNOSIS — I10 ESSENTIAL HYPERTENSION: ICD-10-CM

## 2019-01-16 DIAGNOSIS — E11.69 DM TYPE 2 WITH DIABETIC DYSLIPIDEMIA (HCC): ICD-10-CM

## 2019-01-16 LAB
ALBUMIN SERPL-MCNC: 3.8 G/DL (ref 3.9–4.9)
ALP BLD-CCNC: 68 U/L (ref 35–104)
ALT SERPL-CCNC: 7 U/L (ref 0–41)
ANION GAP SERPL CALCULATED.3IONS-SCNC: 15 MEQ/L (ref 7–13)
AST SERPL-CCNC: 17 U/L (ref 0–40)
BILIRUB SERPL-MCNC: 0.5 MG/DL (ref 0–1.2)
BUN BLDV-MCNC: 26 MG/DL (ref 8–23)
CALCIUM SERPL-MCNC: 9.6 MG/DL (ref 8.6–10.2)
CHLORIDE BLD-SCNC: 103 MEQ/L (ref 98–107)
CHOLESTEROL, TOTAL: 118 MG/DL (ref 0–199)
CO2: 27 MEQ/L (ref 22–29)
CREAT SERPL-MCNC: 1.25 MG/DL (ref 0.7–1.2)
GFR AFRICAN AMERICAN: >60
GFR NON-AFRICAN AMERICAN: 56.8
GLOBULIN: 3.1 G/DL (ref 2.3–3.5)
GLUCOSE BLD-MCNC: 108 MG/DL (ref 74–109)
HBA1C MFR BLD: 6.8 % (ref 4.8–5.9)
HCT VFR BLD CALC: 35.9 % (ref 42–52)
HDLC SERPL-MCNC: 38 MG/DL (ref 40–59)
HEMOGLOBIN: 11.9 G/DL (ref 14–18)
LDL CHOLESTEROL CALCULATED: 66 MG/DL (ref 0–129)
MCH RBC QN AUTO: 32.7 PG (ref 27–31.3)
MCHC RBC AUTO-ENTMCNC: 33.3 % (ref 33–37)
MCV RBC AUTO: 98.3 FL (ref 80–100)
PDW BLD-RTO: 15.6 % (ref 11.5–14.5)
PLATELET # BLD: 244 K/UL (ref 130–400)
POTASSIUM SERPL-SCNC: 4.3 MEQ/L (ref 3.5–5.1)
RBC # BLD: 3.65 M/UL (ref 4.7–6.1)
SODIUM BLD-SCNC: 145 MEQ/L (ref 132–144)
TOTAL PROTEIN: 6.9 G/DL (ref 6.4–8.1)
TRIGL SERPL-MCNC: 70 MG/DL (ref 0–200)
VITAMIN D 25-HYDROXY: 30.5 NG/ML (ref 30–100)
WBC # BLD: 5.3 K/UL (ref 4.8–10.8)

## 2019-01-18 ENCOUNTER — OFFICE VISIT (OUTPATIENT)
Dept: PRIMARY CARE CLINIC | Age: 74
End: 2019-01-18
Payer: MEDICARE

## 2019-01-18 VITALS
WEIGHT: 210 LBS | SYSTOLIC BLOOD PRESSURE: 126 MMHG | HEIGHT: 73 IN | HEART RATE: 51 BPM | RESPIRATION RATE: 14 BRPM | DIASTOLIC BLOOD PRESSURE: 62 MMHG | TEMPERATURE: 95.6 F | OXYGEN SATURATION: 99 % | BODY MASS INDEX: 27.83 KG/M2

## 2019-01-18 DIAGNOSIS — N18.2 TYPE 2 DIABETES MELLITUS WITH STAGE 2 CHRONIC KIDNEY DISEASE, WITHOUT LONG-TERM CURRENT USE OF INSULIN (HCC): Primary | ICD-10-CM

## 2019-01-18 DIAGNOSIS — Z23 NEED FOR TD VACCINE: ICD-10-CM

## 2019-01-18 DIAGNOSIS — E55.9 VITAMIN D DEFICIENCY: ICD-10-CM

## 2019-01-18 DIAGNOSIS — E11.69 DM TYPE 2 WITH DIABETIC DYSLIPIDEMIA (HCC): ICD-10-CM

## 2019-01-18 DIAGNOSIS — I10 ESSENTIAL HYPERTENSION: ICD-10-CM

## 2019-01-18 DIAGNOSIS — E78.5 DM TYPE 2 WITH DIABETIC DYSLIPIDEMIA (HCC): ICD-10-CM

## 2019-01-18 DIAGNOSIS — R53.83 FATIGUE, UNSPECIFIED TYPE: ICD-10-CM

## 2019-01-18 DIAGNOSIS — E11.22 TYPE 2 DIABETES MELLITUS WITH STAGE 2 CHRONIC KIDNEY DISEASE, WITHOUT LONG-TERM CURRENT USE OF INSULIN (HCC): Primary | ICD-10-CM

## 2019-01-18 PROCEDURE — 1123F ACP DISCUSS/DSCN MKR DOCD: CPT | Performed by: FAMILY MEDICINE

## 2019-01-18 PROCEDURE — 3017F COLORECTAL CA SCREEN DOC REV: CPT | Performed by: FAMILY MEDICINE

## 2019-01-18 PROCEDURE — 2022F DILAT RTA XM EVC RTNOPTHY: CPT | Performed by: FAMILY MEDICINE

## 2019-01-18 PROCEDURE — 90714 TD VACC NO PRESV 7 YRS+ IM: CPT | Performed by: FAMILY MEDICINE

## 2019-01-18 PROCEDURE — G8417 CALC BMI ABV UP PARAM F/U: HCPCS | Performed by: FAMILY MEDICINE

## 2019-01-18 PROCEDURE — 1036F TOBACCO NON-USER: CPT | Performed by: FAMILY MEDICINE

## 2019-01-18 PROCEDURE — G8427 DOCREV CUR MEDS BY ELIG CLIN: HCPCS | Performed by: FAMILY MEDICINE

## 2019-01-18 PROCEDURE — 90471 IMMUNIZATION ADMIN: CPT | Performed by: FAMILY MEDICINE

## 2019-01-18 PROCEDURE — 1101F PT FALLS ASSESS-DOCD LE1/YR: CPT | Performed by: FAMILY MEDICINE

## 2019-01-18 PROCEDURE — 99214 OFFICE O/P EST MOD 30 MIN: CPT | Performed by: FAMILY MEDICINE

## 2019-01-18 PROCEDURE — G8482 FLU IMMUNIZE ORDER/ADMIN: HCPCS | Performed by: FAMILY MEDICINE

## 2019-01-18 PROCEDURE — 3044F HG A1C LEVEL LT 7.0%: CPT | Performed by: FAMILY MEDICINE

## 2019-01-18 PROCEDURE — 4040F PNEUMOC VAC/ADMIN/RCVD: CPT | Performed by: FAMILY MEDICINE

## 2019-01-25 RX ORDER — SOTALOL HYDROCHLORIDE 120 MG/1
TABLET ORAL
Qty: 180 TABLET | Refills: 1 | Status: SHIPPED | OUTPATIENT
Start: 2019-01-25

## 2019-01-27 RX ORDER — ALLOPURINOL 300 MG/1
TABLET ORAL
Qty: 90 TABLET | Refills: 1 | Status: SHIPPED | OUTPATIENT
Start: 2019-01-27

## 2019-01-27 ASSESSMENT — ENCOUNTER SYMPTOMS
VISUAL CHANGE: 0
BLURRED VISION: 0

## 2019-01-28 RX ORDER — HYDROCHLOROTHIAZIDE 25 MG/1
TABLET ORAL
Qty: 90 TABLET | Refills: 1 | Status: SHIPPED | OUTPATIENT
Start: 2019-01-28

## 2019-02-10 RX ORDER — METFORMIN HYDROCHLORIDE 500 MG/1
TABLET, EXTENDED RELEASE ORAL
Qty: 360 TABLET | Refills: 1 | Status: SHIPPED | OUTPATIENT
Start: 2019-02-10

## 2019-02-20 ENCOUNTER — TELEPHONE (OUTPATIENT)
Dept: ADMINISTRATIVE | Age: 74
End: 2019-02-20

## 2019-03-22 RX ORDER — MONTELUKAST SODIUM 10 MG/1
TABLET ORAL
Qty: 90 TABLET | Refills: 1 | Status: SHIPPED | OUTPATIENT
Start: 2019-03-22

## 2019-03-22 RX ORDER — LISINOPRIL 40 MG/1
TABLET ORAL
Qty: 90 TABLET | Refills: 1 | Status: SHIPPED | OUTPATIENT
Start: 2019-03-22

## 2019-03-22 RX ORDER — NIFEDIPINE 60 MG/1
TABLET, EXTENDED RELEASE ORAL
Qty: 90 TABLET | Refills: 1 | Status: SHIPPED | OUTPATIENT
Start: 2019-03-22

## 2019-03-22 RX ORDER — ATORVASTATIN CALCIUM 10 MG/1
TABLET, FILM COATED ORAL
Qty: 90 TABLET | Refills: 1 | Status: SHIPPED | OUTPATIENT
Start: 2019-03-22

## 2019-05-28 ENCOUNTER — TELEPHONE (OUTPATIENT)
Dept: ADMINISTRATIVE | Age: 74
End: 2019-05-28

## 2020-06-30 PROBLEM — M96.1 POST LAMINECTOMY SYNDROME: Status: ACTIVE | Noted: 2020-06-30

## 2020-08-04 PROBLEM — M48.062 LUMBAR STENOSIS WITH NEUROGENIC CLAUDICATION: Status: ACTIVE | Noted: 2020-08-04

## 2021-02-03 PROBLEM — M79.604 RIGHT LEG PAIN: Status: ACTIVE | Noted: 2021-02-03

## 2021-02-27 ENCOUNTER — HOSPITAL ENCOUNTER (OUTPATIENT)
Dept: MRI IMAGING | Age: 76
Discharge: HOME | End: 2021-03-01
Payer: MEDICARE

## 2021-02-27 DIAGNOSIS — M48.062 LUMBAR STENOSIS WITH NEUROGENIC CLAUDICATION: ICD-10-CM

## 2021-02-27 DIAGNOSIS — M79.604 RIGHT LEG PAIN: ICD-10-CM

## 2021-02-27 DIAGNOSIS — M96.1 POST LAMINECTOMY SYNDROME: ICD-10-CM

## 2021-02-27 PROCEDURE — 72148 MRI LUMBAR SPINE W/O DYE: CPT

## 2021-03-19 ENCOUNTER — OFFICE VISIT (OUTPATIENT)
Dept: FAMILY MEDICINE CLINIC | Age: 76
End: 2021-03-19
Payer: COMMERCIAL

## 2021-03-19 VITALS
OXYGEN SATURATION: 96 % | WEIGHT: 210 LBS | DIASTOLIC BLOOD PRESSURE: 72 MMHG | HEIGHT: 73 IN | TEMPERATURE: 97.6 F | SYSTOLIC BLOOD PRESSURE: 118 MMHG | HEART RATE: 81 BPM | BODY MASS INDEX: 27.83 KG/M2

## 2021-03-19 DIAGNOSIS — Z01.818 PRE-OP EXAMINATION: ICD-10-CM

## 2021-03-19 DIAGNOSIS — Z01.818 PRE-OP EXAMINATION: Primary | ICD-10-CM

## 2021-03-19 LAB
ANION GAP SERPL CALCULATED.3IONS-SCNC: 13 MEQ/L (ref 9–15)
APTT: 33.1 SEC (ref 24.4–36.8)
BUN BLDV-MCNC: 29 MG/DL (ref 8–23)
CALCIUM SERPL-MCNC: 9.9 MG/DL (ref 8.5–9.9)
CHLORIDE BLD-SCNC: 101 MEQ/L (ref 95–107)
CO2: 27 MEQ/L (ref 20–31)
CREAT SERPL-MCNC: 1.4 MG/DL (ref 0.7–1.2)
GFR AFRICAN AMERICAN: 59.9
GFR NON-AFRICAN AMERICAN: 49.5
GLUCOSE BLD-MCNC: 100 MG/DL (ref 70–99)
HCT VFR BLD CALC: 37.1 % (ref 42–52)
HEMOGLOBIN: 12.2 G/DL (ref 14–18)
INR BLD: 1
MCH RBC QN AUTO: 32 PG (ref 27–31.3)
MCHC RBC AUTO-ENTMCNC: 32.8 % (ref 33–37)
MCV RBC AUTO: 97.5 FL (ref 80–100)
PDW BLD-RTO: 15.7 % (ref 11.5–14.5)
PLATELET # BLD: 250 K/UL (ref 130–400)
POTASSIUM SERPL-SCNC: 4 MEQ/L (ref 3.4–4.9)
PROTHROMBIN TIME: 12.9 SEC (ref 12.3–14.9)
RBC # BLD: 3.81 M/UL (ref 4.7–6.1)
SODIUM BLD-SCNC: 141 MEQ/L (ref 135–144)
WBC # BLD: 7.1 K/UL (ref 4.8–10.8)

## 2021-03-19 PROCEDURE — 99203 OFFICE O/P NEW LOW 30 MIN: CPT | Performed by: NURSE PRACTITIONER

## 2021-03-19 PROCEDURE — 93000 ELECTROCARDIOGRAM COMPLETE: CPT | Performed by: NURSE PRACTITIONER

## 2021-03-19 SDOH — ECONOMIC STABILITY: TRANSPORTATION INSECURITY
IN THE PAST 12 MONTHS, HAS THE LACK OF TRANSPORTATION KEPT YOU FROM MEDICAL APPOINTMENTS OR FROM GETTING MEDICATIONS?: NO

## 2021-03-19 SDOH — ECONOMIC STABILITY: FOOD INSECURITY: WITHIN THE PAST 12 MONTHS, THE FOOD YOU BOUGHT JUST DIDN'T LAST AND YOU DIDN'T HAVE MONEY TO GET MORE.: NEVER TRUE

## 2021-03-19 SDOH — ECONOMIC STABILITY: FOOD INSECURITY: WITHIN THE PAST 12 MONTHS, YOU WORRIED THAT YOUR FOOD WOULD RUN OUT BEFORE YOU GOT MONEY TO BUY MORE.: NEVER TRUE

## 2021-03-19 SDOH — ECONOMIC STABILITY: INCOME INSECURITY: HOW HARD IS IT FOR YOU TO PAY FOR THE VERY BASICS LIKE FOOD, HOUSING, MEDICAL CARE, AND HEATING?: NOT ASKED

## 2021-03-19 ASSESSMENT — PATIENT HEALTH QUESTIONNAIRE - PHQ9
SUM OF ALL RESPONSES TO PHQ QUESTIONS 1-9: 0
2. FEELING DOWN, DEPRESSED OR HOPELESS: 0
1. LITTLE INTEREST OR PLEASURE IN DOING THINGS: 0
SUM OF ALL RESPONSES TO PHQ QUESTIONS 1-9: 0

## 2021-03-19 NOTE — PROGRESS NOTES
Chest pain 25 tablet 3    albuterol sulfate HFA (PROAIR HFA) 108 (90 Base) MCG/ACT inhaler Inhale 2 puffs into the lungs every 6 hours as needed for Wheezing 1 Inhaler 3    CPAP Machine MISC by Does not apply route       ONE TOUCH ULTRA TEST strip TEST TWICE A  strip 3       This patient presents to the office today for a preoperative consultation at the request of surgeon, Dr. Brooklynn Etienne, who plans on performing Right bilateral L1-2, L2-3 microdecompression on March 24 at [facility]. The current problem began 7 years ago, and symptoms have been worsening with time. Conservative therapy: Yes: Physical therapy , which has been ineffective. Planned anesthesia: General   Known anesthesia problems: Past endotracheal intubation problem, states he woke up with choking and coughing, he has a paper and he gave to Dr. Karen Lopez office but did not bring it here with him. Instructed him to take the paper with him to surgery incase it is something important.    Bleeding risk: Anticoagulant therapy- Xarelto- Last dose 3/17  Personal or FH of DVT/PE: Yes - DVTS    Patient objection to receiving blood products: No    Patient Active Problem List   Diagnosis    Lombardi's esophagus    Hypertension    Osteoarthritis    Anemia    ARPITA on CPAP    Tendonitis of shoulder, left    Neck strain    AR (allergic rhinitis)    OA (osteoarthritis of spine)    Lumbosacral radiculopathy at L3    Radiculopathy of leg    Seborrheic keratosis    [facility]    Kidney stone    Hematoma of kidney    Phlebitis    Colon polyps    Psoriasis    Tinea corporis    Lumbar spondylosis    Acquired cyst of kidney    Calculus of ureter    Cardiac arrhythmia    Dysuria    Hematuria    History of surgical procedure    Myogenic ptosis of eyelid    Presbyopia    Pseudophakia    Asthma    Urinary urgency    Vitreous degeneration    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 Parotiditis, ST mass just anterior to Parotid, R    Primary osteoarthritis of both knees    DM type 2 with diabetic dyslipidemia (HCC)    Other hyperlipidemia    Osteoarthritis of lumbar spine with myelopathy    Olecranon bursitis of right elbow    Seasonal allergic rhinitis due to pollen    H/O lumbosacral spine surgery    Vitamin D deficiency    Gastroesophageal reflux disease without esophagitis    Post laminectomy syndrome    Lumbar stenosis with neurogenic claudication    Right leg pain    Arteritis ([address]    PVD (posterior vitreous detachment), both eyes    Paresis of single lower extremity ([address]    History of deep venous thrombosis    Hemothorax    Gout    Fracture of multiple ribs    Fracture of distal end of radius    Fracture of clavicle    Fatigue    Dislocation of acromioclavicular joint    Diarrhea    Diabetes mellitus type 2 without retinopathy ([address]    Contusion of shoulder region    Chest wall pain    Acute sinusitis    Drug therapy       Past Medical History:   Diagnosis Date    ? Parotiditis 9/22/2015    ? Parotiditis, ST mass just anterior to Parotid, R 10/2/2015    Allergic rhinitis     Anemia     Lombardi's esophagus     Bronchitis     Colon polyp     Eczema     Hypertension     Mandibular mass 9/22/2015    Osteoarthritis     RAD (reactive airway disease)     Type II or unspecified type diabetes mellitus without mention of complication, not stated as uncontrolled      Past Surgical History:   Procedure Laterality Date    BACK SURGERY  2007    removal of broken disk low back    BACK SURGERY N/A 9/2013    lower back    CARDIAC CATHETERIZATION      CHOLECYSTECTOMY  2007    CHOLECYSTECTOMY      COLONOSCOPY  11/18/08    DR HATHAWAY    COLONOSCOPY  03/27/14    DR. VELASCO    LITHOTRIPSY  2007    OTHER SURGICAL HISTORY  12/2/2013    LEVATOR ADVANCEMENT BOTH EYES    SPLENECTOMY  1949    UPPER GASTROINTESTINAL ENDOSCOPY  11/18/08 DR Gildardo Cervantes *REPEAT IN 2 YEARS!!    UPPER GASTROINTESTINAL ENDOSCOPY  03/27/14    DR. VELASCO     Family History   Problem Relation Age of Onset    High Blood Pressure Father     Stroke Father     Cancer Mother      Social History     Socioeconomic History    Marital status:      Spouse name: Not on file    Number of children: Not on file    Years of education: Not on file    Highest education level: Not on file   Occupational History    Not on file   Social Needs    Financial resource strain: Not on file    Food insecurity     Worry: Never true     Inability: Never true    Transportation needs     Medical: No     Non-medical: No   Tobacco Use    Smoking status: Never Smoker    Smokeless tobacco: Never Used   Substance and Sexual Activity    Alcohol use: No    Drug use: No    Sexual activity: Not on file   Lifestyle    Physical activity     Days per week: Not on file     Minutes per session: Not on file    Stress: Not on file   Relationships    Social connections     Talks on phone: Not on file     Gets together: Not on file     Attends Zoroastrianism service: Not on file     Active member of club or organization: Not on file     Attends meetings of clubs or organizations: Not on file     Relationship status: Not on file    Intimate partner violence     Fear of current or ex partner: Not on file     Emotionally abused: Not on file     Physically abused: Not on file     Forced sexual activity: Not on file   Other Topics Concern    Not on file   Social History Narrative    Not on file       Review of Systems  A comprehensive review of systems was negative except for: Respiratory: positive for dyspnea on exertion     Physical Exam   Constitutional: He is oriented to person, place, and time. He appears well-developed and well-nourished. No distress. HENT:   Head: Normocephalic and atraumatic.    Mouth/Throat: Uvula is midline, oropharynx is clear and moist and mucous membranes are normal. Eyes: Conjunctivae and EOM are normal. Pupils are equal, round, and reactive to light. Neck: Trachea normal and normal range of motion. Neck supple. No JVD present. Carotid bruit is not present. No mass and no thyromegaly present. Cardiovascular: Normal rate, regular rhythm, normal heart sounds and intact distal pulses. Exam reveals no gallop and no friction rub. No murmur heard. Pulmonary/Chest: Effort normal and breath sounds normal. No respiratory distress. He has no wheezes. He has no rales. Abdominal: Soft. Normal aorta and bowel sounds are normal. He exhibits no distension and no mass. There is no hepatosplenomegaly. No tenderness. Musculoskeletal: He exhibits no edema and no tenderness. Neurological: He is alert and oriented to person, place, and time. He has normal strength. No cranial nerve deficit or sensory deficit. Coordination and gait normal.   Skin: Skin is warm and dry. No rash noted. No erythema. Psychiatric: He has a normal mood and affect. His behavior is normal.     EKG Interpretation:  sinus bradycardia, QRS widening and neg Twaves. No changes from previous     Lab Review: Labs ordered         Assessment:       77 y.o. patient with planned surgery as above. Known risk factors for perioperative complications: Anesthesia concerns- Some kind of issue years ago but recently no trouble, Arrhythmia, Asthma, Bleeding concerns- xarelto, COPD, Diabetes mellitus, Hypertension, Obstructive sleep apnea, CKD  Current medications which may produce withdrawal symptoms if withheld perioperatively: none      Plan:     1. Preoperative workup as follows: ECG, hemoglobin, hematocrit, electrolytes, creatinine, coagulation studies, covid  2. Change in medication regimen before surgery: Take Satolol on morning of surgery with sip of water, and hold all other medications until after surgery, Discontinue Xarelto 7 days before surgery  3.  Prophylaxis for cardiac events with perioperative

## 2021-03-20 LAB
SARS-COV-2: DETECTED
SOURCE: ABNORMAL

## 2021-03-21 PROBLEM — S40.019A CONTUSION OF SHOULDER REGION: Status: ACTIVE | Noted: 2021-03-21

## 2021-03-21 PROBLEM — Z79.899 DRUG THERAPY: Status: ACTIVE | Noted: 2021-03-21

## 2021-03-21 PROBLEM — R07.89 CHEST WALL PAIN: Status: ACTIVE | Noted: 2021-03-21

## 2021-03-21 PROBLEM — R19.7 DIARRHEA: Status: ACTIVE | Noted: 2021-03-21

## 2021-03-21 PROBLEM — J01.90 ACUTE SINUSITIS: Status: ACTIVE | Noted: 2021-03-21

## 2021-03-21 PROBLEM — S52.509A FRACTURE OF DISTAL END OF RADIUS: Status: ACTIVE | Noted: 2021-03-21

## 2021-03-21 PROBLEM — H43.813 PVD (POSTERIOR VITREOUS DETACHMENT), BOTH EYES: Status: ACTIVE | Noted: 2018-08-07

## 2021-03-21 PROBLEM — J94.2 HEMOTHORAX: Status: ACTIVE | Noted: 2021-03-21

## 2021-03-21 PROBLEM — Z86.718 HISTORY OF DEEP VENOUS THROMBOSIS: Status: ACTIVE | Noted: 2021-03-21

## 2021-03-21 PROBLEM — S42.009A FRACTURE OF CLAVICLE: Status: ACTIVE | Noted: 2021-03-21

## 2021-03-21 PROBLEM — I77.6 ARTERITIS (HCC): Status: ACTIVE | Noted: 2021-03-21

## 2021-03-21 PROBLEM — S22.49XA FRACTURE OF MULTIPLE RIBS: Status: ACTIVE | Noted: 2021-03-21

## 2021-03-21 PROBLEM — R53.83 FATIGUE: Status: ACTIVE | Noted: 2021-03-21

## 2021-03-21 PROBLEM — M10.9 GOUT: Status: ACTIVE | Noted: 2021-03-21

## 2021-03-21 PROBLEM — G83.10 PARESIS OF SINGLE LOWER EXTREMITY (HCC): Status: ACTIVE | Noted: 2021-03-21

## 2021-03-21 PROBLEM — S43.109A DISLOCATION OF ACROMIOCLAVICULAR JOINT: Status: ACTIVE | Noted: 2021-03-21

## 2021-04-12 ENCOUNTER — NURSE ONLY (OUTPATIENT)
Dept: PRIMARY CARE CLINIC | Age: 76
End: 2021-04-12

## 2021-04-12 DIAGNOSIS — Z01.818 ENCOUNTER FOR PREADMISSION TESTING: Primary | ICD-10-CM

## 2021-04-12 DIAGNOSIS — Z01.818 ENCOUNTER FOR PREADMISSION TESTING: ICD-10-CM

## 2021-04-14 LAB
SARS-COV-2: NOT DETECTED
SOURCE: NORMAL

## 2021-04-16 ENCOUNTER — ANESTHESIA (OUTPATIENT)
Dept: OPERATING ROOM | Age: 76
End: 2021-04-16
Payer: MEDICARE

## 2021-04-16 ENCOUNTER — ANESTHESIA EVENT (OUTPATIENT)
Dept: OPERATING ROOM | Age: 76
End: 2021-04-16
Payer: MEDICARE

## 2021-04-16 ENCOUNTER — HOSPITAL ENCOUNTER (OUTPATIENT)
Age: 76
Discharge: HOME | End: 2021-04-17
Attending: NEUROLOGICAL SURGERY | Admitting: NEUROLOGICAL SURGERY
Payer: MEDICARE

## 2021-04-16 ENCOUNTER — HOSPITAL ENCOUNTER (OUTPATIENT)
Dept: GENERAL RADIOLOGY | Age: 76
Setting detail: OUTPATIENT SURGERY
Discharge: HOME | End: 2021-04-18
Attending: NEUROLOGICAL SURGERY
Payer: MEDICARE

## 2021-04-16 VITALS — DIASTOLIC BLOOD PRESSURE: 85 MMHG | TEMPERATURE: 95.7 F | OXYGEN SATURATION: 100 % | SYSTOLIC BLOOD PRESSURE: 162 MMHG

## 2021-04-16 DIAGNOSIS — R52 PAIN: ICD-10-CM

## 2021-04-16 LAB
ABO/RH: NORMAL
ANTIBODY SCREEN: NORMAL
GLUCOSE BLD-MCNC: 125 MG/DL (ref 60–115)
GLUCOSE BLD-MCNC: 69 MG/DL (ref 60–115)
GLUCOSE BLD-MCNC: 83 MG/DL (ref 60–115)
GLUCOSE BLD-MCNC: 95 MG/DL (ref 60–115)
PERFORMED ON: ABNORMAL
PERFORMED ON: NORMAL

## 2021-04-16 PROCEDURE — 86900 BLOOD TYPING SEROLOGIC ABO: CPT

## 2021-04-16 PROCEDURE — 2720000010 HC SURG SUPPLY STERILE: Performed by: NEUROLOGICAL SURGERY

## 2021-04-16 PROCEDURE — 86850 RBC ANTIBODY SCREEN: CPT

## 2021-04-16 PROCEDURE — 2500000003 HC RX 250 WO HCPCS: Performed by: ANESTHESIOLOGY

## 2021-04-16 PROCEDURE — 3209999900 FLUORO FOR SURGICAL PROCEDURES

## 2021-04-16 PROCEDURE — 7100000001 HC PACU RECOVERY - ADDTL 15 MIN: Performed by: NEUROLOGICAL SURGERY

## 2021-04-16 PROCEDURE — 6370000000 HC RX 637 (ALT 250 FOR IP): Performed by: NURSE PRACTITIONER

## 2021-04-16 PROCEDURE — 6360000002 HC RX W HCPCS: Performed by: NURSE ANESTHETIST, CERTIFIED REGISTERED

## 2021-04-16 PROCEDURE — 3600000014 HC SURGERY LEVEL 4 ADDTL 15MIN: Performed by: NEUROLOGICAL SURGERY

## 2021-04-16 PROCEDURE — 3700000000 HC ANESTHESIA ATTENDED CARE: Performed by: NEUROLOGICAL SURGERY

## 2021-04-16 PROCEDURE — 3700000001 HC ADD 15 MINUTES (ANESTHESIA): Performed by: NEUROLOGICAL SURGERY

## 2021-04-16 PROCEDURE — 2580000003 HC RX 258: Performed by: NURSE ANESTHETIST, CERTIFIED REGISTERED

## 2021-04-16 PROCEDURE — 64999 UNLISTED PX NERVOUS SYSTEM: CPT | Performed by: STUDENT IN AN ORGANIZED HEALTH CARE EDUCATION/TRAINING PROGRAM

## 2021-04-16 PROCEDURE — 6370000000 HC RX 637 (ALT 250 FOR IP): Performed by: NEUROLOGICAL SURGERY

## 2021-04-16 PROCEDURE — 86901 BLOOD TYPING SEROLOGIC RH(D): CPT

## 2021-04-16 PROCEDURE — 2580000003 HC RX 258: Performed by: NEUROLOGICAL SURGERY

## 2021-04-16 PROCEDURE — 2500000003 HC RX 250 WO HCPCS: Performed by: NEUROLOGICAL SURGERY

## 2021-04-16 PROCEDURE — 76942 ECHO GUIDE FOR BIOPSY: CPT | Performed by: STUDENT IN AN ORGANIZED HEALTH CARE EDUCATION/TRAINING PROGRAM

## 2021-04-16 PROCEDURE — 3600000004 HC SURGERY LEVEL 4 BASE: Performed by: NEUROLOGICAL SURGERY

## 2021-04-16 PROCEDURE — 6360000002 HC RX W HCPCS: Performed by: NEUROLOGICAL SURGERY

## 2021-04-16 PROCEDURE — 2500000003 HC RX 250 WO HCPCS: Performed by: STUDENT IN AN ORGANIZED HEALTH CARE EDUCATION/TRAINING PROGRAM

## 2021-04-16 PROCEDURE — 6360000002 HC RX W HCPCS: Performed by: ANESTHESIOLOGY

## 2021-04-16 PROCEDURE — 2709999900 HC NON-CHARGEABLE SUPPLY: Performed by: NEUROLOGICAL SURGERY

## 2021-04-16 PROCEDURE — 2500000003 HC RX 250 WO HCPCS: Performed by: NURSE ANESTHETIST, CERTIFIED REGISTERED

## 2021-04-16 PROCEDURE — 7100000000 HC PACU RECOVERY - FIRST 15 MIN: Performed by: NEUROLOGICAL SURGERY

## 2021-04-16 RX ORDER — SODIUM CHLORIDE 9 MG/ML
INJECTION, SOLUTION INTRAVENOUS CONTINUOUS
Status: DISCONTINUED | OUTPATIENT
Start: 2021-04-16 | End: 2021-04-17 | Stop reason: HOSPADM

## 2021-04-16 RX ORDER — SUCCINYLCHOLINE/SOD CL,ISO/PF 100 MG/5ML
SYRINGE (ML) INTRAVENOUS PRN
Status: DISCONTINUED | OUTPATIENT
Start: 2021-04-16 | End: 2021-04-16 | Stop reason: SDUPTHER

## 2021-04-16 RX ORDER — FENTANYL CITRATE 50 UG/ML
25 INJECTION, SOLUTION INTRAMUSCULAR; INTRAVENOUS EVERY 10 MIN PRN
Status: COMPLETED | OUTPATIENT
Start: 2021-04-16 | End: 2021-04-16

## 2021-04-16 RX ORDER — BACITRACIN, NEOMYCIN, POLYMYXIN B 400; 3.5; 5 [USP'U]/G; MG/G; [USP'U]/G
OINTMENT TOPICAL PRN
Status: DISCONTINUED | OUTPATIENT
Start: 2021-04-16 | End: 2021-04-17 | Stop reason: HOSPADM

## 2021-04-16 RX ORDER — NICOTINE POLACRILEX 4 MG
15 LOZENGE BUCCAL PRN
Status: DISCONTINUED | OUTPATIENT
Start: 2021-04-16 | End: 2021-04-17 | Stop reason: HOSPADM

## 2021-04-16 RX ORDER — SOTALOL HYDROCHLORIDE 120 MG/1
120 TABLET ORAL EVERY MORNING
Status: DISCONTINUED | OUTPATIENT
Start: 2021-04-17 | End: 2021-04-17 | Stop reason: HOSPADM

## 2021-04-16 RX ORDER — SODIUM CHLORIDE 0.9 % (FLUSH) 0.9 %
10 SYRINGE (ML) INJECTION EVERY 12 HOURS SCHEDULED
Status: DISCONTINUED | OUTPATIENT
Start: 2021-04-16 | End: 2021-04-17 | Stop reason: HOSPADM

## 2021-04-16 RX ORDER — LISINOPRIL 20 MG/1
40 TABLET ORAL DAILY
Status: DISCONTINUED | OUTPATIENT
Start: 2021-04-17 | End: 2021-04-17 | Stop reason: HOSPADM

## 2021-04-16 RX ORDER — METOCLOPRAMIDE HYDROCHLORIDE 5 MG/ML
10 INJECTION INTRAMUSCULAR; INTRAVENOUS
Status: DISCONTINUED | OUTPATIENT
Start: 2021-04-16 | End: 2021-04-16 | Stop reason: HOSPADM

## 2021-04-16 RX ORDER — MAGNESIUM HYDROXIDE 1200 MG/15ML
LIQUID ORAL CONTINUOUS PRN
Status: COMPLETED | OUTPATIENT
Start: 2021-04-16 | End: 2021-04-16

## 2021-04-16 RX ORDER — ONDANSETRON 2 MG/ML
4 INJECTION INTRAMUSCULAR; INTRAVENOUS EVERY 6 HOURS PRN
Status: DISCONTINUED | OUTPATIENT
Start: 2021-04-16 | End: 2021-04-17 | Stop reason: HOSPADM

## 2021-04-16 RX ORDER — DEXTROSE MONOHYDRATE 50 MG/ML
100 INJECTION, SOLUTION INTRAVENOUS PRN
Status: DISCONTINUED | OUTPATIENT
Start: 2021-04-16 | End: 2021-04-17 | Stop reason: HOSPADM

## 2021-04-16 RX ORDER — ONDANSETRON 2 MG/ML
INJECTION INTRAMUSCULAR; INTRAVENOUS PRN
Status: DISCONTINUED | OUTPATIENT
Start: 2021-04-16 | End: 2021-04-16 | Stop reason: SDUPTHER

## 2021-04-16 RX ORDER — HYDROCODONE BITARTRATE AND ACETAMINOPHEN 5; 325 MG/1; MG/1
2 TABLET ORAL PRN
Status: DISCONTINUED | OUTPATIENT
Start: 2021-04-16 | End: 2021-04-16 | Stop reason: HOSPADM

## 2021-04-16 RX ORDER — GLYCOPYRROLATE 1 MG/5 ML
SYRINGE (ML) INTRAVENOUS PRN
Status: DISCONTINUED | OUTPATIENT
Start: 2021-04-16 | End: 2021-04-16 | Stop reason: SDUPTHER

## 2021-04-16 RX ORDER — ATORVASTATIN CALCIUM 10 MG/1
10 TABLET, FILM COATED ORAL DAILY
Status: DISCONTINUED | OUTPATIENT
Start: 2021-04-17 | End: 2021-04-17 | Stop reason: HOSPADM

## 2021-04-16 RX ORDER — ACETAMINOPHEN 80 MG
TABLET,CHEWABLE ORAL ONCE
Status: COMPLETED | OUTPATIENT
Start: 2021-04-16 | End: 2021-04-17

## 2021-04-16 RX ORDER — NIFEDIPINE 30 MG/1
60 TABLET, EXTENDED RELEASE ORAL DAILY
Status: DISCONTINUED | OUTPATIENT
Start: 2021-04-17 | End: 2021-04-17 | Stop reason: HOSPADM

## 2021-04-16 RX ORDER — DIPHENHYDRAMINE HYDROCHLORIDE 50 MG/ML
12.5 INJECTION INTRAMUSCULAR; INTRAVENOUS
Status: DISCONTINUED | OUTPATIENT
Start: 2021-04-16 | End: 2021-04-16 | Stop reason: HOSPADM

## 2021-04-16 RX ORDER — MIDAZOLAM HYDROCHLORIDE 1 MG/ML
INJECTION INTRAMUSCULAR; INTRAVENOUS PRN
Status: DISCONTINUED | OUTPATIENT
Start: 2021-04-16 | End: 2021-04-16 | Stop reason: SDUPTHER

## 2021-04-16 RX ORDER — MEPERIDINE HYDROCHLORIDE 25 MG/ML
12.5 INJECTION INTRAMUSCULAR; INTRAVENOUS; SUBCUTANEOUS EVERY 5 MIN PRN
Status: DISCONTINUED | OUTPATIENT
Start: 2021-04-16 | End: 2021-04-16 | Stop reason: HOSPADM

## 2021-04-16 RX ORDER — SODIUM CHLORIDE, SODIUM LACTATE, POTASSIUM CHLORIDE, CALCIUM CHLORIDE 600; 310; 30; 20 MG/100ML; MG/100ML; MG/100ML; MG/100ML
INJECTION, SOLUTION INTRAVENOUS CONTINUOUS
Status: DISCONTINUED | OUTPATIENT
Start: 2021-04-16 | End: 2021-04-16

## 2021-04-16 RX ORDER — HYDROCHLOROTHIAZIDE 12.5 MG/1
12.5 TABLET ORAL 2 TIMES DAILY
Status: DISCONTINUED | OUTPATIENT
Start: 2021-04-17 | End: 2021-04-17 | Stop reason: HOSPADM

## 2021-04-16 RX ORDER — CEFAZOLIN SODIUM 2 G/50ML
2000 SOLUTION INTRAVENOUS EVERY 8 HOURS
Status: COMPLETED | OUTPATIENT
Start: 2021-04-16 | End: 2021-04-17

## 2021-04-16 RX ORDER — VANCOMYCIN HYDROCHLORIDE 1 G/20ML
INJECTION, POWDER, LYOPHILIZED, FOR SOLUTION INTRAVENOUS PRN
Status: DISCONTINUED | OUTPATIENT
Start: 2021-04-16 | End: 2021-04-16 | Stop reason: ALTCHOICE

## 2021-04-16 RX ORDER — HYDROCODONE BITARTRATE AND ACETAMINOPHEN 5; 325 MG/1; MG/1
1 TABLET ORAL PRN
Status: DISCONTINUED | OUTPATIENT
Start: 2021-04-16 | End: 2021-04-16 | Stop reason: HOSPADM

## 2021-04-16 RX ORDER — FENTANYL CITRATE 50 UG/ML
INJECTION, SOLUTION INTRAMUSCULAR; INTRAVENOUS PRN
Status: DISCONTINUED | OUTPATIENT
Start: 2021-04-16 | End: 2021-04-16 | Stop reason: SDUPTHER

## 2021-04-16 RX ORDER — SODIUM CHLORIDE, SODIUM LACTATE, POTASSIUM CHLORIDE, CALCIUM CHLORIDE 600; 310; 30; 20 MG/100ML; MG/100ML; MG/100ML; MG/100ML
INJECTION, SOLUTION INTRAVENOUS CONTINUOUS PRN
Status: DISCONTINUED | OUTPATIENT
Start: 2021-04-16 | End: 2021-04-16 | Stop reason: SDUPTHER

## 2021-04-16 RX ORDER — POLYETHYLENE GLYCOL 3350 17 G/17G
17 POWDER, FOR SOLUTION ORAL DAILY PRN
Status: DISCONTINUED | OUTPATIENT
Start: 2021-04-16 | End: 2021-04-17 | Stop reason: HOSPADM

## 2021-04-16 RX ORDER — LIDOCAINE HYDROCHLORIDE AND EPINEPHRINE 10; 10 MG/ML; UG/ML
INJECTION, SOLUTION INFILTRATION; PERINEURAL PRN
Status: DISCONTINUED | OUTPATIENT
Start: 2021-04-16 | End: 2021-04-16 | Stop reason: ALTCHOICE

## 2021-04-16 RX ORDER — ONDANSETRON 4 MG/1
4 TABLET, ORALLY DISINTEGRATING ORAL EVERY 8 HOURS PRN
Status: DISCONTINUED | OUTPATIENT
Start: 2021-04-16 | End: 2021-04-17 | Stop reason: HOSPADM

## 2021-04-16 RX ORDER — SODIUM CHLORIDE 0.9 % (FLUSH) 0.9 %
10 SYRINGE (ML) INJECTION PRN
Status: DISCONTINUED | OUTPATIENT
Start: 2021-04-16 | End: 2021-04-17 | Stop reason: HOSPADM

## 2021-04-16 RX ORDER — ACETAMINOPHEN 325 MG/1
650 TABLET ORAL EVERY 4 HOURS PRN
Status: DISCONTINUED | OUTPATIENT
Start: 2021-04-16 | End: 2021-04-17 | Stop reason: HOSPADM

## 2021-04-16 RX ORDER — DEXTROSE MONOHYDRATE 25 G/50ML
12.5 INJECTION, SOLUTION INTRAVENOUS PRN
Status: DISCONTINUED | OUTPATIENT
Start: 2021-04-16 | End: 2021-04-17 | Stop reason: HOSPADM

## 2021-04-16 RX ORDER — OXYCODONE HYDROCHLORIDE 5 MG/1
5 TABLET ORAL EVERY 6 HOURS PRN
Status: DISCONTINUED | OUTPATIENT
Start: 2021-04-16 | End: 2021-04-17 | Stop reason: HOSPADM

## 2021-04-16 RX ORDER — BUPIVACAINE HYDROCHLORIDE 2.5 MG/ML
INJECTION, SOLUTION EPIDURAL; INFILTRATION; INTRACAUDAL PRN
Status: DISCONTINUED | OUTPATIENT
Start: 2021-04-16 | End: 2021-04-16 | Stop reason: SDUPTHER

## 2021-04-16 RX ORDER — SOTALOL HYDROCHLORIDE 120 MG/1
60 TABLET ORAL EVERY EVENING
Status: DISCONTINUED | OUTPATIENT
Start: 2021-04-16 | End: 2021-04-17 | Stop reason: HOSPADM

## 2021-04-16 RX ORDER — MIDAZOLAM HYDROCHLORIDE 2 MG/2ML
INJECTION, SOLUTION INTRAMUSCULAR; INTRAVENOUS PRN
Status: DISCONTINUED | OUTPATIENT
Start: 2021-04-16 | End: 2021-04-16 | Stop reason: SDUPTHER

## 2021-04-16 RX ORDER — ROCURONIUM BROMIDE 10 MG/ML
INJECTION, SOLUTION INTRAVENOUS PRN
Status: DISCONTINUED | OUTPATIENT
Start: 2021-04-16 | End: 2021-04-16 | Stop reason: SDUPTHER

## 2021-04-16 RX ORDER — SODIUM CHLORIDE 9 MG/ML
25 INJECTION, SOLUTION INTRAVENOUS PRN
Status: DISCONTINUED | OUTPATIENT
Start: 2021-04-16 | End: 2021-04-17 | Stop reason: HOSPADM

## 2021-04-16 RX ORDER — ONDANSETRON 2 MG/ML
4 INJECTION INTRAMUSCULAR; INTRAVENOUS
Status: DISCONTINUED | OUTPATIENT
Start: 2021-04-16 | End: 2021-04-16 | Stop reason: HOSPADM

## 2021-04-16 RX ORDER — LIDOCAINE HYDROCHLORIDE 10 MG/ML
INJECTION, SOLUTION EPIDURAL; INFILTRATION; INTRACAUDAL; PERINEURAL PRN
Status: DISCONTINUED | OUTPATIENT
Start: 2021-04-16 | End: 2021-04-16 | Stop reason: SDUPTHER

## 2021-04-16 RX ORDER — CEFAZOLIN SODIUM 2 G/50ML
2000 SOLUTION INTRAVENOUS
Status: COMPLETED | OUTPATIENT
Start: 2021-04-16 | End: 2021-04-16

## 2021-04-16 RX ORDER — OXYCODONE HYDROCHLORIDE 5 MG/1
5 TABLET ORAL EVERY 4 HOURS PRN
Status: DISCONTINUED | OUTPATIENT
Start: 2021-04-16 | End: 2021-04-17 | Stop reason: HOSPADM

## 2021-04-16 RX ORDER — PROPOFOL 10 MG/ML
INJECTION, EMULSION INTRAVENOUS PRN
Status: DISCONTINUED | OUTPATIENT
Start: 2021-04-16 | End: 2021-04-16 | Stop reason: SDUPTHER

## 2021-04-16 RX ADMIN — FENTANYL CITRATE 25 MCG: 50 INJECTION, SOLUTION INTRAMUSCULAR; INTRAVENOUS at 17:24

## 2021-04-16 RX ADMIN — MIDAZOLAM HYDROCHLORIDE 2 MG: 2 INJECTION, SOLUTION INTRAMUSCULAR; INTRAVENOUS at 13:18

## 2021-04-16 RX ADMIN — FENTANYL CITRATE 50 MCG: 50 INJECTION, SOLUTION INTRAMUSCULAR; INTRAVENOUS at 14:18

## 2021-04-16 RX ADMIN — SODIUM CHLORIDE, POTASSIUM CHLORIDE, SODIUM LACTATE AND CALCIUM CHLORIDE: 600; 310; 30; 20 INJECTION, SOLUTION INTRAVENOUS at 13:56

## 2021-04-16 RX ADMIN — MIDAZOLAM HYDROCHLORIDE 2 MG: 1 INJECTION, SOLUTION INTRAMUSCULAR; INTRAVENOUS at 13:57

## 2021-04-16 RX ADMIN — SODIUM CHLORIDE, POTASSIUM CHLORIDE, SODIUM LACTATE AND CALCIUM CHLORIDE: 600; 310; 30; 20 INJECTION, SOLUTION INTRAVENOUS at 14:13

## 2021-04-16 RX ADMIN — BUPIVACAINE HYDROCHLORIDE 50 ML: 2.5 INJECTION, SOLUTION EPIDURAL; INFILTRATION; INTRACAUDAL; PERINEURAL at 13:27

## 2021-04-16 RX ADMIN — FENTANYL CITRATE 25 MCG: 50 INJECTION, SOLUTION INTRAMUSCULAR; INTRAVENOUS at 16:42

## 2021-04-16 RX ADMIN — SOTALOL HYDROCHLORIDE 60 MG: 120 TABLET ORAL at 23:45

## 2021-04-16 RX ADMIN — Medication 0.2 MG: at 14:22

## 2021-04-16 RX ADMIN — CEFAZOLIN SODIUM 2000 MG: 2 SOLUTION INTRAVENOUS at 22:58

## 2021-04-16 RX ADMIN — ONDANSETRON 4 MG: 2 INJECTION INTRAMUSCULAR; INTRAVENOUS at 14:08

## 2021-04-16 RX ADMIN — PROPOFOL 150 MG: 10 INJECTION, EMULSION INTRAVENOUS at 14:03

## 2021-04-16 RX ADMIN — FENTANYL CITRATE 25 MCG: 50 INJECTION, SOLUTION INTRAMUSCULAR; INTRAVENOUS at 16:55

## 2021-04-16 RX ADMIN — Medication 100 MG: at 14:03

## 2021-04-16 RX ADMIN — SODIUM CHLORIDE, POTASSIUM CHLORIDE, SODIUM LACTATE AND CALCIUM CHLORIDE: 600; 310; 30; 20 INJECTION, SOLUTION INTRAVENOUS at 15:03

## 2021-04-16 RX ADMIN — LIDOCAINE HYDROCHLORIDE 1 ML: 10 INJECTION, SOLUTION EPIDURAL; INFILTRATION; INTRACAUDAL; PERINEURAL at 14:03

## 2021-04-16 RX ADMIN — FENTANYL CITRATE 50 MCG: 50 INJECTION, SOLUTION INTRAMUSCULAR; INTRAVENOUS at 14:03

## 2021-04-16 RX ADMIN — FENTANYL CITRATE 25 MCG: 50 INJECTION, SOLUTION INTRAMUSCULAR; INTRAVENOUS at 17:36

## 2021-04-16 RX ADMIN — SODIUM CHLORIDE: 9 INJECTION, SOLUTION INTRAVENOUS at 22:58

## 2021-04-16 RX ADMIN — ROCURONIUM BROMIDE 50 MG: 10 INJECTION INTRAVENOUS at 14:08

## 2021-04-16 RX ADMIN — SUGAMMADEX 200 MG: 100 INJECTION, SOLUTION INTRAVENOUS at 15:58

## 2021-04-16 RX ADMIN — CEFAZOLIN SODIUM 2000 MG: 2 SOLUTION INTRAVENOUS at 13:59

## 2021-04-16 ASSESSMENT — PAIN SCALES - GENERAL
PAINLEVEL_OUTOF10: 4

## 2021-04-16 ASSESSMENT — ENCOUNTER SYMPTOMS
NAUSEA: 0
SORE THROAT: 0
WHEEZING: 0
DIARRHEA: 0
CONSTIPATION: 0
TROUBLE SWALLOWING: 0
VOMITING: 0
SHORTNESS OF BREATH: 0
ABDOMINAL PAIN: 0
EYES NEGATIVE: 1
CHEST TIGHTNESS: 0
COUGH: 0

## 2021-04-16 ASSESSMENT — PULMONARY FUNCTION TESTS
PIF_VALUE: 2
PIF_VALUE: 20
PIF_VALUE: 16
PIF_VALUE: 16
PIF_VALUE: 19
PIF_VALUE: 16
PIF_VALUE: 0
PIF_VALUE: 20
PIF_VALUE: 17
PIF_VALUE: 20
PIF_VALUE: 20
PIF_VALUE: 14
PIF_VALUE: 21
PIF_VALUE: 20
PIF_VALUE: 3
PIF_VALUE: 17
PIF_VALUE: 19
PIF_VALUE: 16
PIF_VALUE: 19
PIF_VALUE: 21
PIF_VALUE: 20
PIF_VALUE: 16
PIF_VALUE: 16
PIF_VALUE: 20
PIF_VALUE: 20
PIF_VALUE: 19
PIF_VALUE: 19
PIF_VALUE: 3
PIF_VALUE: 20
PIF_VALUE: 20
PIF_VALUE: 16
PIF_VALUE: 3
PIF_VALUE: 20
PIF_VALUE: 16
PIF_VALUE: 21
PIF_VALUE: 20
PIF_VALUE: 17
PIF_VALUE: 19
PIF_VALUE: 2
PIF_VALUE: 20
PIF_VALUE: 19
PIF_VALUE: 19
PIF_VALUE: 21
PIF_VALUE: 20
PIF_VALUE: 20
PIF_VALUE: 21
PIF_VALUE: 35
PIF_VALUE: 20
PIF_VALUE: 16
PIF_VALUE: 2
PIF_VALUE: 20
PIF_VALUE: 0
PIF_VALUE: 19
PIF_VALUE: 20
PIF_VALUE: 14
PIF_VALUE: 20
PIF_VALUE: 2
PIF_VALUE: 19
PIF_VALUE: 16
PIF_VALUE: 21
PIF_VALUE: 20
PIF_VALUE: 19
PIF_VALUE: 19
PIF_VALUE: 20
PIF_VALUE: 19
PIF_VALUE: 18
PIF_VALUE: 16
PIF_VALUE: 19

## 2021-04-16 ASSESSMENT — PAIN - FUNCTIONAL ASSESSMENT: PAIN_FUNCTIONAL_ASSESSMENT: 0-10

## 2021-04-16 ASSESSMENT — PAIN DESCRIPTION - LOCATION: LOCATION: BACK

## 2021-04-16 NOTE — ANESTHESIA PRE PROCEDURE
Department of Anesthesiology  Preprocedure Note       Name:  Shorty Parent   Age:  77 y.o.  :  1945                                          MRN:  [id number]         Date:  2021      Surgeon: Karyn Kay):  Angelia Diaz MD    Procedure: Procedure(s):  RIGHT BILATERAL L1-2, L2-3 MICRODECOMPRESSION. 1 HOUR / 1 C-ARM / POWER RONGEUR. RECENT PAT ON 3/19/21    Medications prior to admission:   Prior to Admission medications    Medication Sig Start Date End Date Taking?  Authorizing Provider   ADVAIR DISKUS 250-50 MCG/DOSE AEPB TAKE 1 PUFF BY MOUTH TWICE A DAY 20  Yes Historical Provider, MD   montelukast (SINGULAIR) 10 MG tablet TAKE 1 TABLET BY MOUTH EVERY DAY AT NIGHT 3/22/19  Yes Dennis Mauricio MD   atorvastatin (LIPITOR) 10 MG tablet TAKE 1 TABLET BY MOUTH ONCE DAILY 3/22/19  Yes Dennis Mauricio MD   NIFEdipine (PROCARDIA XL) 60 MG extended release tablet TAKE 1 TABLET BY MOUTH ONCE DAILY 3/22/19  Yes Dennis Mauricio MD   lisinopril (PRINIVIL;ZESTRIL) 40 MG tablet TAKE 1 TABLET BY MOUTH ONCE DAILY 3/22/19  Yes Dennis Mauricio MD   metFORMIN (GLUCOPHAGE-XR) 500 MG extended release tablet TAKE 1 TABLET BY MOUTH 4 TIMES A DAY 2/10/19  Yes CRYSTAL Gonzalez NP   hydrochlorothiazide (HYDRODIURIL) 25 MG tablet TAKE ONE HALF TABLET BY MOUTH TWICE DAILY 19  Yes Diomedes Higuera MD   allopurinol (ZYLOPRIM) 300 MG tablet TAKE 1 TABLET BY MOUTH ONCE DAILY 19  Yes Diomedes Higuera MD   SOTALOL  MG TABS TAKE 1 TABLET BY MOUTH TWICE DAILY 19  Yes Diomedes Higuera MD   pioglitazone (ACTOS) 45 MG tablet TAKE 1 TABLET BY MOUTH ONCE DAILY 10/31/18  Yes Diomedes Higuera MD   XARELTO 20 MG TABS tablet TAKE 1 TABLET BY MOUTH EVERY DAY  Patient not taking: Reported on 3/19/2021 10/10/18   Diomedes Higuera MD   nitroGLYCERIN (NITROSTAT) 0.4 MG SL tablet Place 1 tablet under the tongue every 5 minutes as needed for Chest pain 18   Diomedes Higuera MD   CPAP Machine MISC by Does not apply route     Historical Provider, MD   ONE TOUCH ULTRA TEST strip TEST TWICE A DAY 9/6/17   Latonya Ramirez MD       Current medications:    Current Facility-Administered Medications   Medication Dose Route Frequency Provider Last Rate Last Admin    ceFAZolin (ANCEF) 2000 mg in dextrose 3 % 50 mL IVPB (duplex)  2,000 mg Intravenous On Call to [address] Stefany Okeefe MD        lactated ringers infusion   Intravenous Continuous Eleno Rubin MD           Allergies: Allergies   Allergen Reactions    Diflucan [Fluconazole]        Problem List:    Patient Active Problem List   Diagnosis Code    Lombardi's esophagus K22.70    Hypertension I10    Osteoarthritis M19.90    Anemia D64.9    ARPITA on CPAP G47.33, Z99.89    Tendonitis of shoulder, left M77.8    Neck strain S16. 1XXA    AR (allergic rhinitis) J30.9    OA (osteoarthritis of spine) M47.9    Lumbosacral radiculopathy at L3 M54.17    Radiculopathy of leg M54.10    Seborrheic keratosis L82.1    DVT (deep venous thrombosis) ([facility]) I82.409    Kidney stone N20.0    Hematoma of kidney S37.019A    Phlebitis I80.9    Colon polyps K63.5    Psoriasis L40.9    Tinea corporis B35.4    Lumbar spondylosis M47.816    Acquired cyst of kidney N28.1    Calculus of ureter N20.1    Cardiac arrhythmia I49.9    Dysuria R30.0    Hematuria R31.9    History of surgical procedure Z98.890    Myogenic ptosis of eyelid H02.429    Presbyopia H52.4    Pseudophakia Z96.1    Asthma J45.909    Urinary urgency R39.15    Vitreous degeneration H43.819    DM type 2 causing CKD stage 2 ([facility]) E11.22, N18.2    OA (osteoarthritis) of knee M17.10    Ecchymosis R58    Mandibular mass R22.0    ?  Parotiditis, ST mass just anterior to Parotid, R K11.20    Primary osteoarthritis of both knees M17.0    DM type 2 with diabetic dyslipidemia ([facility]) E11.69, E78.5    Other hyperlipidemia E78.49    Osteoarthritis of lumbar spine with myelopathy M47.16    Olecranon bursitis of right elbow M70.21    Seasonal allergic rhinitis due to pollen J30.1    H/O lumbosacral spine surgery Z98.890    Vitamin D deficiency E55.9    Gastroesophageal reflux disease without esophagitis K21.9    Post laminectomy syndrome M96.1    Lumbar stenosis with neurogenic claudication M48.062    Right leg pain M79.604    Arteritis ([facility]) I77.6    PVD (posterior vitreous detachment), both eyes H43.813    Paresis of single lower extremity ([facility]) G83.10    History of deep venous thrombosis Z86.718    Hemothorax J94.2    Gout M10.9    Fracture of multiple ribs S22.49XA    Fracture of distal end of radius S52.509A    Fracture of clavicle S42.009A    Fatigue R53.83    Dislocation of acromioclavicular joint S43.109A    Diarrhea R19.7    Diabetes mellitus type 2 without retinopathy ([facility]) E11.9    Contusion of shoulder region S40.019A    Chest wall pain R07.89    Acute sinusitis J01.90    Drug therapy Z79.899       Past Medical History:        Diagnosis Date    ? Parotiditis 09/22/2015    ? Parotiditis, ST mass just anterior to Parotid, R 10/02/2015    Allergic rhinitis     Anemia     Lombardi's esophagus     Bronchitis     Colon polyp     Deep vein thrombosis (DVT) of lower extremity ([facility]) 1996    Eczema     Hypertension     Mandibular mass 09/22/2015    Osteoarthritis     RAD (reactive airway disease)     Type II or unspecified type diabetes mellitus without mention of complication, not stated as uncontrolled        Past Surgical History:        Procedure Laterality Date    BACK SURGERY  2007    removal of broken disk low back    BACK SURGERY N/A 9/2013    lower back    CARDIAC CATHETERIZATION      CHOLECYSTECTOMY  2007    CHOLECYSTECTOMY      COLONOSCOPY  11/18/08    DR HATHAWAY    COLONOSCOPY  03/27/14    DR. VELASCO    LITHOTRIPSY  2007    OTHER SURGICAL HISTORY  12/2/2013    LEVATOR ADVANCEMENT BOTH EYES    SPLENECTOMY  1949    UPPER GASTROINTESTINAL ENDOSCOPY  11/18/08    DR Mallika Aleman PROTIME 12.9 03/19/2021    INR 1.0 03/19/2021    APTT 33.1 03/19/2021       HCG (If Applicable): No results found for: PREGTESTUR, PREGSERUM, HCG, HCGQUANT     ABGs: No results found for: PHART, PO2ART, RCN8QAC, AXO3KBE, BEART, E8AEARNJ     Type & Screen (If Applicable):  No results found for: LABABO, LABRH    Drug/Infectious Status (If Applicable):  No results found for: HIV, HEPCAB    COVID-19 Screening (If Applicable):   Lab Results   Component Value Date    COVID19 Not Detected 04/12/2021           Anesthesia Evaluation     history of anesthetic complications: difficult airway. Airway: Mallampati: II  TM distance: >3 FB   Neck ROM: full  Mouth opening: > = 3 FB Dental: normal exam         Pulmonary: breath sounds clear to auscultation  (+) sleep apnea: on CPAP,  asthma:                            Cardiovascular:    (+) hypertension:,       ECG reviewed  Rhythm: regular                      Neuro/Psych:   (+) neuromuscular disease:,             GI/Hepatic/Renal:   (+) GERD:,           Endo/Other:    (+) DiabetesType II DM, well controlled, , .                 Abdominal:           Vascular: negative vascular ROS. Anesthesia Plan      general and regional     ASA 3     (US Erector Spinae plane block)  Induction: intravenous. MIPS: Prophylactic antiemetics administered. Anesthetic plan and risks discussed with patient. Use of blood products discussed with patient whom consented to blood products. Plan discussed with CRNA.     Attending anesthesiologist reviewed and agrees with Pre Eval content              Bolivar Holter, MD   4/16/2021

## 2021-04-16 NOTE — H&P
Patient:  Ledy Maxwell  YOB: 1945  Date: 4 16 2021  The patient is a 77 y.o. male who presents today for evaluation of the following problems:      Chief Complaint   Patient presents with    Back Pain         Referred by No ref. provider found     HISTORY OF PRESENT ILLNESS:     He has not tried physical therapy. Date of last of last PT treatment: none           Estimated body mass index is 27.71 kg/m² as calculated from the following:    Height as of this encounter: 6' 1\" (1.854 m). Weight as of this encounter: 210 lb (95.3 kg). PAST MEDICAL, FAMILY AND SOCIAL HISTORY:  Past Medical History        Past Medical History:   Diagnosis Date    ? Parotiditis 9/22/2015    ? Parotiditis, ST mass just anterior to Parotid, R 10/2/2015    Allergic rhinitis      Anemia      Lombardi's esophagus      Bronchitis      Colon polyp      Eczema      Hypertension      Mandibular mass 9/22/2015    Osteoarthritis      RAD (reactive airway disease)      Type II or unspecified type diabetes mellitus without mention of complication, not stated as uncontrolled           Past Surgical History         Past Surgical History:   Procedure Laterality Date    BACK SURGERY   2007     removal of broken disk low back    BACK SURGERY N/A 9/2013     lower back    CARDIAC CATHETERIZATION        CHOLECYSTECTOMY   2007    CHOLECYSTECTOMY        COLONOSCOPY   11/18/08     DR HATHAWAY    COLONOSCOPY   03/27/14     DR. VELASCO    LITHOTRIPSY   2007    OTHER SURGICAL HISTORY   12/2/2013     LEVATOR ADVANCEMENT BOTH EYES    SPLENECTOMY   1949    UPPER GASTROINTESTINAL ENDOSCOPY   11/18/08     DR HATHAWAY *REPEAT IN 2 YEARS!!    UPPER GASTROINTESTINAL ENDOSCOPY   03/27/14     DR. VELASCO         Family History         Family History   Problem Relation Age of Onset    High Blood Pressure Father      Stroke Father      Cancer Mother                  Current Outpatient Medications on File Prior to Visit Medication Sig Dispense Refill    gabapentin (NEURONTIN) 100 MG capsule Take 1 capsule by mouth 3 times daily as needed (right leg pain) for up to 90 days. 270 capsule 2    montelukast (SINGULAIR) 10 MG tablet TAKE 1 TABLET BY MOUTH EVERY DAY AT NIGHT 90 tablet 1    atorvastatin (LIPITOR) 10 MG tablet TAKE 1 TABLET BY MOUTH ONCE DAILY 90 tablet 1    NIFEdipine (PROCARDIA XL) 60 MG extended release tablet TAKE 1 TABLET BY MOUTH ONCE DAILY 90 tablet 1    lisinopril (PRINIVIL;ZESTRIL) 40 MG tablet TAKE 1 TABLET BY MOUTH ONCE DAILY 90 tablet 1    metFORMIN (GLUCOPHAGE-XR) 500 MG extended release tablet TAKE 1 TABLET BY MOUTH 4 TIMES A  tablet 1    hydrochlorothiazide (HYDRODIURIL) 25 MG tablet TAKE ONE HALF TABLET BY MOUTH TWICE DAILY 90 tablet 1    allopurinol (ZYLOPRIM) 300 MG tablet TAKE 1 TABLET BY MOUTH ONCE DAILY 90 tablet 1    SOTALOL  MG TABS TAKE 1 TABLET BY MOUTH TWICE DAILY 180 tablet 1    pioglitazone (ACTOS) 45 MG tablet TAKE 1 TABLET BY MOUTH ONCE DAILY 90 tablet 1    Fluticasone Furoate-Vilanterol (BREO ELLIPTA) 200-25 MCG/INH AEPB One puff daily 1 each 0    XARELTO 20 MG TABS tablet TAKE 1 TABLET BY MOUTH EVERY DAY 90 tablet 1    nitroGLYCERIN (NITROSTAT) 0.4 MG SL tablet Place 1 tablet under the tongue every 5 minutes as needed for Chest pain 25 tablet 3    albuterol sulfate HFA (PROAIR HFA) 108 (90 Base) MCG/ACT inhaler Inhale 2 puffs into the lungs every 6 hours as needed for Wheezing 1 Inhaler 3    CPAP Machine MISC by Does not apply route         ONE TOUCH ULTRA TEST strip TEST TWICE A  strip 3    fexofenadine (ALLEGRA ALLERGY) 180 MG tablet Take 1 tablet by mouth daily 30 tablet 5      No current facility-administered medications on file prior to visit.        Social History           Tobacco Use    Smoking status: Never Smoker    Smokeless tobacco: Never Used   Substance Use Topics    Alcohol use: No    Drug use: No         ALLERGIES       Allergies Allergen Reactions    Diflucan [Fluconazole]           REVIEW OF SYSTEMS:  Review of Systems   Constitutional: Negative for fever. HENT: Negative for hearing loss. Respiratory: Positive for shortness of breath. Gastrointestinal: Negative for constipation, diarrhea and nausea. Genitourinary: Negative for difficulty urinating. Musculoskeletal: Positive for back pain. Negative for neck pain. Skin: Negative for rash. Neurological: Negative for headaches. Hematological: Does not bruise/bleed easily. Psychiatric/Behavioral: Positive for sleep disturbance. Physical Exam:       Vitals       Vitals:     21 1128   Weight: 210 lb (95.3 kg)   Height: 6' 1\" (1.854 m)                 Right side       2021 MRI lumbar spine       HISTORY OF PRESENT ILLNESS:  Jordan Montenegro,  1945, 69years old, 6 feet 1 inch tall and 218 pounds with a BMI of 28, comes in today. Physical therapy did not help. He gets pain down into his right groin, numbness, tingling pain in the right thigh, L3 distribution. He has been having chronic low back pain for a long time. He had surgery in , then again in . It did help him for a long time. 2013 decompressive surgery L2-3. His MRI shows he has had left laminectomy at L2-3 and right laminectomy at L3-4 and L4-5. He is here mainly because right greater than left lower extremity pains. Not too much on the left. He probably would not even come into the doctor for what he has on the left side. On the right side the pain is mainly into the groin and thigh with numbness in the thigh. He says the pain is worse now than the pain he had in the same area before his last operation. He also has decreased feeling into his right thigh. The pain is in the groin to the thigh, coming down to about the knee but not much pain below the knee. If he lays flat he cannot stay there long because the low back pain gets worse.   He does all of his activities but he just puts up with the pain after he has done the activities. He was told by Dr. Alverto Mead that he is always going to have pain and that is why he has put up with it for so long but it has gotten to the point now where he is hoping something can be done. He takes a number of medications but nothing for the pain and he does not have any arthritis medications. He is a diabetic. He has heart disease. He is on Xarelto, which is a blood thinner. PHYSICAL EXAMINATION:  Examination shows a pleasant, 78-year-old male who is able to get up fairly easily. On heel walk and toe walk he has a slight give-way on the right side as compared to the left. However, individual muscle strength testing was normal.  He has normal strength for hip flexion, external rotation, knee extension and flexion and foot dorsiflexion. Sensation was objectively intact except for right thigh in which there is definite objective decrease to light touch as compared to the left. Straight leg raising negative for radicular pain. Good range of motion of the knees and hips. Abdomen protuberant. Bowel sounds present. Lungs were clear. Heart tones were normal.  Cranial nerves II through XII were grossly intact. He has good strength, sensation and range of motion in the upper extremities. His MRI of the lumbar spine of 06/12/2019 reviewed. His pain is on the right side. On the right side he does have fairly moderately severe stenosis at L1-2 and moderate at L2-3, slightly progressed. On the right side there is foraminal stenosis for the exit of the L3 at L3-4 and for the L4 nerve root at L4-5. This is seen on the sagittal views but not confirmed on the axial views by my reading. He has had shots in the past and they did not help. He has had antiinflammatories, they did not help. He has had Celebrex that did not help. Physical therapy did not help.   Pretty much every option that is available for him, none of them have helped him except the surgeries that he had previously. He is going to be seeing his family physician, Dr. Allyson Carr, for his medications. He does have central canal stenosis at L1-2 and moderate at L2-3. He has compression at L1-2 and L2-3, moderately. I offer him decompression at L1-2, L2-3 and decompression for the exit of the L3 on the right at L3-4. Electromyography (EMG)/Nerve conduction studies (NCS) Report: Lower Extremity     Name: Jackson Márquez   YOB: 1945  Date of Service: 8/20/2020   Provider: Alexis Bella MD         INDICATIONS:  Jackson Márquez is a 69 y.o. male who presents for electrodiagnostic evaluation by request of Dr. Kiki Can for lumbar radiculopathy, evaluation for right L3 nerve. The patient's main symptom for evaluation is right thigh and leg pain. He confirms a history of diabetes mellitus and lumbar spine surgery. Both limbs are necessary to examine in order to evaluate for any evidence of systemic disease as well as establish normal baseline values from which to compare any abnormal unilateral findings. The study is explained and verbal consent to proceed is obtained. NERVE CONDUCTION STUDIES:    Sensory nerve conduction studies: Bilateral sural and superficial peroneal sensory nerve conduction studies demonstrate no response. Bilateral lower limb temperatures are normal.      Motor nerve conduction studies: Right peroneal motor nerve conduction study with pickup over the extensor digitorum brevis demonstrates normal distal latency and nearly absent amplitude. Left peroneal motor nerve conduction study with pickup over the extensor digitorum brevis demonstrates no response. Given the distal peroneal motor abnormalities, additional studies are performed.  Right peroneal motor nerve conduction study with pickup over the tibialis anterior demonstrates normal distal latency, normal amplitudes, and normal conduction velocity across the right fibular head. Bilateral tibial motor nerve conduction studies with pickup over the abductor hallucis demonstrate no response. H reflex: Bilateral H reflexes demonstrate no response. ELECTROMYOGRAPHY: A disposable monopolar needle is used to evaluate the right vastus medialis, vastus lateralis, rectus femoris, adductor longus, tibialis anterior, extensor hallucis longus, peroneus longus, medial gastrocnemius, and lateral gastrocnemius. All of the muscles sampled are free of any increased insertional activity or any abnormal spontaneous activity. Motor unit recruitment is unremarkable. A disposable monopolar needle is used to evaluate the left vastus medialis, tibialis anterior, extensor hallucis longus, peroneus longus, medial gastrocnemius, and lateral gastrocnemius. All of the muscles sampled are free of any increased insertional activity or any abnormal spontaneous activity. Motor unit recruitment is unremarkable. Lumbar paraspinal muscle sampling is deferred given history of lumbar spine surgery. The patient is on anticoagulation. The smallest gauge needle electrode is employed for the electromyography portion of the exam. Only superficial muscles are sampled, and all regions close to major vascular structures and nerves are avoided. Pressure is maintained over the needle puncture sites when necessary. No abnormal hemostasis or bleeding is encountered during the exam. The patient is re-examined and confirmed to have no abnormal bleeding after completing the test.     SUMMARY:  This study is abnormal:  1. There is no current electrodiagnostic evidence for an active lumbosacral motor radiculopathy as clinically questioned. Despite extensive testing in the right L2-L4 region, no abnormal spontaneous activity is encountered on electromyography. 2. 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  There is sensory greater than motor nerve involvement with axonal greater than demyelinating changes without any active denervation on electromyography. This may be consistent with the patient's history of diabetes mellitus. RECOMMENDATIONS: Today's study does not explain the patient's unilateral thigh and leg pain. The patient should follow up with Dr. Justin Farmer as previously instructed. If his symptoms persist or worsen, further electrodiagnostic evaluation may be considered if the patient is agreeable. Clinical correlation is recommended       EMG study did not confirm a radiculopathy but more of a neuropathy, which I discussed with the patient. I have reviewed his MRI in detail with the patient on a computer screen looking at the images. The MRI is of 6/12/2019. He does have severe canal stenosis at L1-2, moderate 2-3, 3-4. I re-studied all of the foramen on the right side as he has a right radiculopathy. Even though on the sagittal views at L3-4 for the L3 nerve and at the L4 nerve at L4-5 there appears to be some stenosis, it just is not that significant on the axial views. He is a candidate for right and bilateral L1-2 microdecompression. He had been scheduled for surgery but fell out of a tree on 09/05/2020 with a hemothorax requiring drainage of the blood off of the lining of his lungs, a fractured left collar bone, severe fracture of his right wrist which was plated. He is doing much better now and he has recovered. TREATMENT AND RECOMMENDATIONS:  I have gone ahead and discussed the procedure, indications and risks which are small but still present chance including even something serious like death, paralysis, sensory loss, loss of bladder or bowel control, pain, bleeding, infection, CSF leak, spinal instability, chance of recurrence and so forth. Surgery is not a guarantee of normalcy. We cannot undo any permanent damage already done, cannot change the course of any of his other medical diseases.   I have discussed alternative procedures, risks and benefits. I have answered all of the patient's questions. He understands and agrees to proceed. He has had a full course of physical therapy, has been on gabapentin without much relief. He has had all kinds of pain management modalities and shots. He is having a harder time walking distances because of his L1-2 stenosis and neurogenic claudication. 09/30/2013 decompressive surgery L2-3. Examination: MRI LUMBAR SPINE WO CONTRAST       History: Lumbar stenosis with neurogenic claudication. Right leg pain. Technique: Multiplanar multisequence MRI of the lumbar spine was obtained without intravenous contrast.       Comparison: MRI lumbar spine from August 22, 2013 and CT abdomen pelvis from August 22, 2013       Findings: The conus medullaris ends normally. The alignment of the lumbar spine is anatomic. The vertebral body heights are well maintained. There is no aggressive bone marrow signal abnormality. This desiccation throughout the lumbar spine. Moderate intervertebral disc height loss throughout the lumbar spine. Multilevel degenerative plate changes with spurring. Multilevel Schmorl's nodes. L1-L2: Small disc bulge. Mild facet arthropathy. Ligament flavum thickening. Severe spinal canal stenosis. Mild bilateral neuroforaminal stenosis. L2-L3: Moderate disc bulge. Mild facet arthropathy. Severe spinal canal stenosis. Severe bilateral neuroforaminal stenosis. L3-L4: Small disc bulge with posterior endplate spurring. Mild facet arthropathy. Postsurgical changes of right hemilaminectomy. Mild spinal canal stenosis. Severe bilateral neuroforaminal stenosis, left greater than right. L4-L5: Small disc bulge with posterior endplate spurring. Mild facet arthropathy. Postsurgical changes of right hemilaminectomy. Mild spinal canal stenosis. Severe bilateral neuroforaminal stenosis.        L5-S1: Small disc bulge with posterior endplate spurring. Mild facet arthropathy. No spinal canal stenosis. Severe bilateral neuroforaminal stenosis. Visualized paravertebral soft tissues are grossly unremarkable. Complex solid and cystic lesion of the right kidney measures up to 6 cm. Impression       Advanced changes of the lumbar spine as detailed including severe spinal canal stenosis at L1-L2 and L2-L3 and severe bilateral neuroforaminal stenosis at L3-4 through L5-S1. A nonspecific complex solid and cystic lesion of the right kidney measures up to 6 cm. This is incompletely evaluated on this examination and was seen on prior CT of August 22, 2013.    MRI 2/27/2021 lumbar spine    Plan right and bilateral L1-2 and L2-3 microdecompressions and right L3 foraminotomy     Schuyler Martinez MD

## 2021-04-16 NOTE — ANESTHESIA POSTPROCEDURE EVALUATION
9Department of Anesthesiology  Postprocedure Note    Patient: Destiny Castillo  MRN: [id number]  YOB: 1945  Date of evaluation: 4/16/2021  Time:  4:15 PM     Procedure Summary     Date: 04/16/21 Room / Location: [facility] [place] OR [address] [place] [address]    Anesthesia Start: 1359 Anesthesia Stop:     Procedure: RIGHT BILATERAL L1-2, L2-3 MICRODECOMPRESSION (N/A ) Diagnosis: (L1-2, L2-3 STENOSIS)    Surgeons: Dariana Guillermo MD Responsible Provider: Kamala Garcia DO    Anesthesia Type: general, regional ASA Status: 3          Anesthesia Type: GETA    Lin Phase I:    9    Lin Phase II:      Last vitals: Reviewed and per EMR flowsheets.        Anesthesia Post Evaluation    Patient location during evaluation: bedside  Patient participation: complete - patient participated  Level of consciousness: awake and awake and alert  Pain score: 0  Airway patency: patent  Nausea & Vomiting: no nausea and no vomiting  Complications: no  Cardiovascular status: blood pressure returned to baseline and hemodynamically stable  Respiratory status: acceptable  Hydration status: euvolemic  Comments: Skin tear right cheek from positioning and tape

## 2021-04-16 NOTE — BRIEF OP NOTE
Brief Postoperative Note      Patient: Governor Segundo  YOB: 1945  MRN: [id number]    Date of Procedure: 4/16/2021    Pre-Op Diagnosis: L1-2, L2-3 STENOSIS    Post-Op Diagnosis: Same       Procedure(s):  RIGHT BILATERAL L1-2, L2-3 MICRODECOMPRESSION    Surgeon(s):  Dewey Ruiz MD    Assistant:  First Assistant: Brendan Wetzel    Anesthesia: General    Estimated Blood Loss (mL): less than 50     Complications: None        Drains:   Closed/Suction Drain Inferior Back Accordion 10 Armenian (Active)       Findings: Stenosis    Electronically signed by Jaime Hart MD on 4/16/2021 at 3:57 PM

## 2021-04-16 NOTE — FLOWSHEET NOTE
1830- Patient AOx4, no pain at this time. Hemovac is intact, bright red blood present in tubing. Dressing to mid/lower back is clean dry and intact. Patient has full sensation in all extremities. Patient states that he feels like he needs to urinate, but is unable too. Patient given urinal at this time, patient states he would like to try to use the urinal. Skin tears present on face.

## 2021-04-16 NOTE — DISCHARGE INSTR - COC
Continuity of Care Form    Patient Name: Awais Fernandes   :  1945  MRN:  [id number]    Admit date:  2021  Discharge date:  ***    Code Status Order: No Order   Advance Directives:   Advance Care Flowsheet Documentation     Date/Time Healthcare Directive Type of Healthcare Directive Copy in [address] Agent's Name Healthcare Agent's Phone Number    21 1026  Yes, patient has an advance directive for healthcare treatment --   at home -- -- --          Admitting Physician:  Suaznne Neri MD  PCP: Garland Daniels MD    Discharging Nurse: [facility] Unit/Room#: [address] Unit Phone Number: ***    Emergency Contact:   Extended Emergency Contact Information  Primary Emergency Contact: Eleanor Waters  Address: [address] [place] [address] [address] [place] [address] Phone: [phone]  Mobile Phone: [phone]  Relation: Spouse    Past Surgical History:  Past Surgical History:   Procedure Laterality Date    BACK SURGERY      removal of broken disk low back    BACK SURGERY N/A 2013    lower back    CARDIAC CATHETERIZATION      CHOLECYSTECTOMY  2007    CHOLECYSTECTOMY      COLONOSCOPY  08    DR Jimi Quezada    COLONOSCOPY  14    DR. VELASCO    LITHOTRIPSY      OTHER SURGICAL HISTORY  2013    LEVATOR ADVANCEMENT BOTH EYES    SPLENECTOMY      UPPER GASTROINTESTINAL ENDOSCOPY  08    DR HATHAWAY *REPEAT IN 2 YEARS!!    UPPER GASTROINTESTINAL ENDOSCOPY  14    DR. VELASCO       Immunization History:   Immunization History   Administered Date(s) Administered    Influenza 2011, 10/15/2012, 10/04/2013    Influenza Virus Vaccine 10/13/2010, 2014    Influenza, High Dose (Fluzone 65 yrs and older) 10/02/2015, 2016, 2017, 2018    Pneumococcal Conjugate 13-valent ([id number]) 2016    Pneumococcal Polysaccharide (Esmsxdtbt30) 2012    Td (Adult), 2 Lf Tetanus Toxoid, Pf (Td, Absorbed) 01/18/2019    Zoster Live (Zostavax) 06/03/2013       Active Problems:  Patient Active Problem List   Diagnosis Code    Lombardi's esophagus K22.70    Hypertension I10    Osteoarthritis M19.90    Anemia D64.9    ARPITA on CPAP G47.33, Z99.89    Tendonitis of shoulder, left M77.8    Neck strain S16. 1XXA    AR (allergic rhinitis) J30.9    OA (osteoarthritis of spine) M47.9    Lumbosacral radiculopathy at L3 M54.17    Radiculopathy of leg M54.10    Seborrheic keratosis L82.1    DVT (deep venous thrombosis) ([facility]) I82.409    Kidney stone N20.0    Hematoma of kidney S37.019A    Phlebitis I80.9    Colon polyps K63.5    Psoriasis L40.9    Tinea corporis B35.4    Lumbar spondylosis M47.816    Acquired cyst of kidney N28.1    Calculus of ureter N20.1    Cardiac arrhythmia I49.9    Dysuria R30.0    Hematuria R31.9    History of surgical procedure Z98.890    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    Olecranon bursitis of right elbow M70.21    Seasonal allergic rhinitis due to pollen J30.1    H/O lumbosacral spine surgery Z98.890    Vitamin D deficiency E55.9    Gastroesophageal reflux disease without esophagitis K21.9    Post laminectomy syndrome M96.1    Lumbar stenosis with neurogenic claudication M48.062    Right leg pain M79.604    Arteritis ([facility]) I77.6    PVD (posterior vitreous detachment), both eyes H43.813    Paresis of single Catheter:920413223}   Colostomy/Ileostomy/Ileal Conduit: {YES / AC:93167}       Date of Last BM: ***    Intake/Output Summary (Last 24 hours) at 2021 1603  Last data filed at 2021 1503  Gross per 24 hour   Intake 1000 ml   Output    Net 1000 ml     No intake/output data recorded.     Safety Concerns:     [address] [facility] [facility] Safety Concerns:856570186}    Impairments/Disabilities:      [facility] Impairments/Disabilities:807058434}    Nutrition Therapy:  Current Nutrition Therapy:   [facility] Diet List:788510663}    Routes of Feeding: {CHP DME Other Feedings:386625828}  Liquids: {Slp liquid thickness:26958}  Daily Fluid Restriction: {CHP DME Yes amt example:323828661}  Last Modified Barium Swallow with Video (Video Swallowing Test): {Done Not Done [facility]:021330731}    Treatments at the Time of Hospital Discharge:   Respiratory Treatments: ***  Oxygen Therapy:  {Therapy; copd oxygen:47273}  Ventilator:    { CC Vent NBYL:463088572}    Rehab Therapies: {THERAPEUTIC INTERVENTION:6303613307}  Weight Bearing Status/Restrictions: [address] [facility] Weight Bearin}  Other Medical Equipment (for information only, NOT a DME order):  {EQUIPMENT:375224653}  Other Treatments: ***    Patient's personal belongings (please select all that are sent with patient):  {[facility] DME Belongings:129613970}    RN SIGNATURE:  {Esignature:925961012}    CASE MANAGEMENT/SOCIAL WORK SECTION    Inpatient Status Date: ***    Readmission Risk Assessment Score:  Readmission Risk              Risk of Unplanned Readmission:        0           Discharging to Facility/ Agency   · Name:   · Address:  · Phone:  · Fax:    Dialysis Facility (if applicable)   · Name:  · Address:  · Dialysis Schedule:  · Phone:  · Fax:    / signature: {Esignature:914789772}    PHYSICIAN SECTION    Prognosis: {Prognosis:7145089600}    Condition at Discharge: [address] Patient Condition:768935970}    Rehab Potential (if transferring to Rehab): {Prognosis:0754819034}    Recommended Labs or Other Treatments After Discharge: ***    Physician Certification: I certify the above information and transfer of Anahi Panda  is necessary for the continuing treatment of the diagnosis listed and that he requires {Admit to Appropriate Level of Care:73731} for {GREATER/LESS:099138368} 30 days.      Update Admission H&P: {CHP DME Changes in TNRPQ:253549834}    PHYSICIAN SIGNATURE:  Electronically signed by Gi Baldwin MD on 4/16/21 at 4:03 PM EDT

## 2021-04-16 NOTE — ANESTHESIA PROCEDURE NOTES
Peripheral Block    Patient location during procedure: pre-op  Start time: 4/16/2021 1:22 PM  End time: 4/16/2021 1:30 PM  Staffing  Performed: anesthesiologist   Anesthesiologist: Eduardo Osman MD  Preanesthetic Checklist  Completed: patient identified, IV checked, site marked, risks and benefits discussed, surgical consent, monitors and equipment checked, pre-op evaluation, timeout performed, anesthesia consent given, oxygen available and patient being monitored  Peripheral Block  Patient position: sitting  Prep: ChloraPrep  Patient monitoring: cardiac monitor, continuous pulse ox, frequent blood pressure checks and IV access  Block type: Erector spinae (at L2 vertebra level)  Laterality: bilateral  Injection technique: single-shot  Guidance: ultrasound guided  Local infiltration: bupivacaine  Infiltration strength: 0.25 %  Dose: 50 mL  Provider prep: mask and sterile gloves (Sterile probe cover)  Local infiltration: bupivacaine  Needle  Needle type: combined needle/nerve stimulator   Needle gauge: 21 G  Needle length: 10 cm  Needle localization: anatomical landmarks and ultrasound guidance  Assessment  Injection assessment: negative aspiration for heme, no paresthesia on injection and local visualized surrounding nerve on ultrasound  Paresthesia pain: immediately resolved  Slow fractionated injection: yes  Hemodynamics: stable  Additional Notes  Ultrasound image printed and saved in patient chart.     Sterile probe cover used    Reason for block: post-op pain management and at surgeon's request

## 2021-04-16 NOTE — PROGRESS NOTES
GIN bilat block performed by Dr Yolie Miller, pt tolerated well, Dr Suzanne Yanez in to speak with pt

## 2021-04-16 NOTE — FLOWSHEET NOTE
4/16/21 @ 1928 Notified Dr. Lieutenant Orozco of Hospitalist consult for [address] via [employer]

## 2021-04-17 VITALS
RESPIRATION RATE: 18 BRPM | TEMPERATURE: 99.1 F | BODY MASS INDEX: 28.1 KG/M2 | HEART RATE: 54 BPM | SYSTOLIC BLOOD PRESSURE: 163 MMHG | DIASTOLIC BLOOD PRESSURE: 69 MMHG | HEIGHT: 73 IN | WEIGHT: 212 LBS | OXYGEN SATURATION: 97 %

## 2021-04-17 LAB
ANION GAP SERPL CALCULATED.3IONS-SCNC: 6 MEQ/L (ref 9–15)
BUN BLDV-MCNC: 23 MG/DL (ref 8–23)
CALCIUM SERPL-MCNC: 8.2 MG/DL (ref 8.5–9.9)
CHLORIDE BLD-SCNC: 100 MEQ/L (ref 95–107)
CO2: 31 MEQ/L (ref 20–31)
CREAT SERPL-MCNC: 1.42 MG/DL (ref 0.7–1.2)
GFR AFRICAN AMERICAN: 58.9
GFR NON-AFRICAN AMERICAN: 48.7
GLUCOSE BLD-MCNC: 122 MG/DL (ref 60–115)
GLUCOSE BLD-MCNC: 128 MG/DL (ref 70–99)
GLUCOSE BLD-MCNC: 165 MG/DL (ref 60–115)
PERFORMED ON: ABNORMAL
PERFORMED ON: ABNORMAL
POTASSIUM REFLEX MAGNESIUM: 3.9 MEQ/L (ref 3.4–4.9)
SODIUM BLD-SCNC: 137 MEQ/L (ref 135–144)

## 2021-04-17 PROCEDURE — 97161 PT EVAL LOW COMPLEX 20 MIN: CPT

## 2021-04-17 PROCEDURE — 6370000000 HC RX 637 (ALT 250 FOR IP): Performed by: NURSE PRACTITIONER

## 2021-04-17 PROCEDURE — 80048 BASIC METABOLIC PNL TOTAL CA: CPT

## 2021-04-17 PROCEDURE — 2580000003 HC RX 258: Performed by: NEUROLOGICAL SURGERY

## 2021-04-17 PROCEDURE — 6360000002 HC RX W HCPCS: Performed by: NEUROLOGICAL SURGERY

## 2021-04-17 RX ORDER — TAMSULOSIN HYDROCHLORIDE 0.4 MG/1
0.4 CAPSULE ORAL DAILY
Qty: 30 CAPSULE | Refills: 0 | Status: SHIPPED | OUTPATIENT
Start: 2021-04-17

## 2021-04-17 RX ADMIN — Medication: at 02:30

## 2021-04-17 RX ADMIN — INSULIN LISPRO 2 UNITS: 100 INJECTION, SOLUTION INTRAVENOUS; SUBCUTANEOUS at 12:40

## 2021-04-17 RX ADMIN — HYDROCHLOROTHIAZIDE 12.5 MG: 12.5 TABLET ORAL at 09:17

## 2021-04-17 RX ADMIN — LISINOPRIL 40 MG: 20 TABLET ORAL at 09:17

## 2021-04-17 RX ADMIN — NIFEDIPINE 60 MG: 30 TABLET, FILM COATED, EXTENDED RELEASE ORAL at 09:17

## 2021-04-17 RX ADMIN — CEFAZOLIN SODIUM 2000 MG: 2 SOLUTION INTRAVENOUS at 06:18

## 2021-04-17 RX ADMIN — SODIUM CHLORIDE, PRESERVATIVE FREE 10 ML: 5 INJECTION INTRAVENOUS at 09:19

## 2021-04-17 ASSESSMENT — PAIN SCALES - GENERAL: PAINLEVEL_OUTOF10: 2

## 2021-04-17 NOTE — OP NOTE
[address]                      [address] [place], [address] [facility]                                OPERATIVE REPORT    PATIENT NAME: Laure Duarte                  :        1945  MED REC NO:   [id number]                            ROOM:       [id number]  ACCOUNT NO:   [de-identified]                           ADMIT DATE: 2021  PROVIDER:     Leobardo Billy MD    DATE OF PROCEDURE:  2021    PREOPERATIVE DIAGNOSIS:  L1-L2 and L2-L3 canal stenosis with neurogenic  claudication. POSTOPERATIVE DIAGNOSIS:  L1-L2 and L2-L3 canal stenosis with neurogenic  claudication. OPERATION PERFORMED:  Right and bilateral L1-L2 and L2-L3  microdecompression. SURGEON:  Leobardo Billy MD    ANESTHESIA:  General endotracheal anesthesia. DESCRIPTION OF PROCEDURE  The patient was given general endotracheal  anesthesia in supine position. Turned to prone position. Back was  prepped and draped. Time-out, patient identified. Needle was placed. Lateral x-rays obtained to confirm level. Needle was withdrawn. Skin  infiltrated with a solution 1% Marcaine with epinephrine. Skin incision  was made over the tips of spinous processes of L1, L2, L3. Dissection  was carried down the spinous process tips. On the right side only, the  fascia was excised using muscle sparing technique. Spinous processes,  lamina, facet joint complex L1, L2, L3 on the right side exposed. Needle was placed. Lateral x-rays obtained to confirm level. Needle  was withdrawn. Microscope was brought into the field with the micro  retractor in place. Partial hemilaminectomy on the right side was  performed at L2-L3, working superiorly, performing a partial  hemilaminectomy in the inferior aspect of L2 detaching the hypertrophied  ligamentum flavum from the medial aspect of the facet joint complex on  the right at L2-L3 and from the superior lamina of L3.   I then angled  the microscope over the dorsal aspect of the dural sac to the patient's  left side. Hypertrophied ligamentum flavum was removed from the  patient's left side decompressing the L2-L3 level right and bilateral.   Microscope and retractor moved superiorly to L1-L2. Partial  hemilaminectomy in the inferior aspect of L1 was performed, small medial  facetectomy at L1-L2 detaching the hypertrophied ligamentum flavum from  the lamina, superior L1, inferior L2 and from the medial aspect of facet  joint complex. Microscope angled over the dorsal aspect of the dural  sac to the patient's left side removing the hypertrophied ligamentum  flavum from the inferior aspect of lamina of L1, superior aspect of L2  and from the medial facet joint decompressing the L1-L2 level. Wound  was thoroughly irrigated. Some vancomycin powder was applied. Wound  drain was placed. Fascia closed with 0 Vicryl suture. Subcutaneous  tissues were closed with 2-0, 3-0, 4-0 Vicryl suture. EBL 50 mL. No  blood transfused.         Verona Smith MD    D: 04/16/2021 16:05:45       T: 04/16/2021 16:16:15     [email or web address]  Job#: [id number]     Doc#: [id number]    CC:

## 2021-04-17 NOTE — PROGRESS NOTES
Physician Progress Note    2021   2:30 PM    Name:  Elizabeth Peña  MRN:    [id number]      Day: 0     Admit Date: 2021 10:24 AM  PCP: Catia Anguiano MD    Code Status:  Full Code    Subjective:      no new events. Doing well. Physical Examination:      Vitals:  BP (!) 163/69   Pulse 54   Temp 99.1 °F (37.3 °C) (Oral)   Resp 18   Ht 6' 1\" (1.854 m)   Wt 212 lb (96.2 kg)   SpO2 97%   BMI 27.97 kg/m²   Temp (24hrs), Av.3 °F (35.7 °C), Min:95.5 °F (35.3 °C), Max:99.1 °F (37.3 °C)      General appearance: alert, cooperative and no distress  Mental Status: oriented to person, place and time and normal affect  Lungs: clear to auscultation bilaterally, normal effort  Heart: regular rate and rhythm, no murmur  Abdomen: soft, nontender, nondistended, bowel sounds present, no masses  Skin: no gross lesions, rashes    Data:     Labs:  No results for input(s): WBC, HGB, PLT in the last 72 hours. Recent Labs     21  0549      K 3.9      CO2 31   BUN 23   CREATININE 1.42*   GLUCOSE 128*     No results for input(s): AST, ALT, ALB, BILITOT, ALKPHOS in the last 72 hours.     Current Facility-Administered Medications   Medication Dose Route Frequency Provider Last Rate Last Admin    0.9 % sodium chloride infusion   Intravenous Continuous Nina Renteria  mL/hr at 21 2258 New Bag at 21 2258    sodium chloride flush 0.9 % injection 10 mL  10 mL Intravenous 2 times per day Nina Renteria MD   10 mL at 21 0919    sodium chloride flush 0.9 % injection 10 mL  10 mL Intravenous PRN Nina Renteria MD        0.9 % sodium chloride infusion  25 mL Intravenous PRN Nina Renteria MD        acetaminophen (TYLENOL) tablet 650 mg  650 mg Oral Q4H PRN Nina Renteria MD        oxyCODONE (ROXICODONE) immediate release tablet 5 mg  5 mg Oral Q6H PRN Nina Renteria MD        Or    oxyCODONE (ROXICODONE) immediate release tablet 5 mg  5 mg Oral Q4H PRN Nina Renteria MD        HYDROmorphone (DILAUDID) injection 0.25 mg  0.25 mg Intravenous Q3H PRN Neida Lorenzana MD        Or    HYDROmorphone (DILAUDID) injection 0.5 mg  0.5 mg Intravenous Q3H PRN Neida Lorenzana MD        bisacodyl (DULCOLAX) EC tablet 5 mg  5 mg Oral Daily Neida Lorenzana MD        magnesium hydroxide (MILK OF MAGNESIA) 400 MG/5ML suspension 30 mL  30 mL Oral Daily PRN Neida Lorenzana MD        polyethylene glycol [facility]) packet 17 g  17 g Oral Daily PRN Neida Lorenzana MD        ondansetron (ZOFRAN-ODT) disintegrating tablet 4 mg  4 mg Oral Q8H PRN Neida Lorenzana MD        Or    [facility]ron [facility]) injection 4 mg  4 mg Intravenous Q6H PRN Neida Lorenzana MD        neomycin-bacitracin-polymyxin (NEOSPORIN) ointment   Topical PRN Eleno Rubin MD        atorvastatin (LIPITOR) tablet 10 mg  10 mg Oral Daily CRYSTAL Stuart CNP        hydroCHLOROthiazide (HYDRODIURIL) tablet 12.5 mg  12.5 mg Oral BID Anmol CRYSTAL Moya CNP   12.5 mg at 04/17/21 8284    lisinopril (PRINIVIL;ZESTRIL) tablet 40 mg  40 mg Oral Daily Anmol CRYSTAL Moya CNP   40 mg at 04/17/21 1902    NIFEdipine (PROCARDIA XL) extended release tablet 60 mg  60 mg Oral Daily CRYSTAL Stuart CNP   60 mg at 04/17/21 6524    sotalol (BETAPACE) tablet 120 mg  120 mg Oral QAM CRYSTAL Stuart CNP   Stopped at 04/17/21 8908    sotalol (BETAPACE) tablet 60 mg  60 mg Oral QPM CRYSTAL Stuart CNP   60 mg at 04/16/21 2345    insulin lispro (HUMALOG) injection vial 0-12 Units  0-12 Units Subcutaneous TID WC CRYSTAL Lindsay CNP   2 Units at 04/17/21 1240    insulin lispro (HUMALOG) injection vial 0-6 Units  0-6 Units Subcutaneous Nightly Anmol CRYSTAL Moya CNP        glucose (GLUTOSE) 40 % oral gel 15 g  15 g Oral PRN CRYSTAL Stuart CNP        dextrose 50 % IV solution  12.5 g Intravenous PRN CRYSTAL Stuart CNP        glucagon (rDNA)

## 2021-04-17 NOTE — PROGRESS NOTES
Pt strait cathed for 800 ml of clear yellow urine. Pt reported difficulty urinating and had >700 bladder scanned post void in bathroom. States that he has been only able to go a little bit at a time. Pt tolerated well. Pt denies desire for pain medication at this time. Call light in reach. Bed alarm on. Dressing to back intact. 8461:  No change in pt's neuro check. He denies any numbness or tingling to extremities. Denies desire for pain medication. Ice to back. Call light in reach. Bed alarm on.       0700:  Hemovac removed and dressing changed. Pt ahd 45 ml drainage from 2300. Pt also up to bathroom and voided. Pt tolerated well.

## 2021-04-17 NOTE — CONSULTS
AT NIGHT 3/22/19  Yes Rafael Gutierrez MD   atorvastatin (LIPITOR) 10 MG tablet TAKE 1 TABLET BY MOUTH ONCE DAILY 3/22/19  Yes Rafael Gutierrez MD   NIFEdipine (PROCARDIA XL) 60 MG extended release tablet TAKE 1 TABLET BY MOUTH ONCE DAILY 3/22/19  Yes Rafael Gutierrez MD   lisinopril (PRINIVIL;ZESTRIL) 40 MG tablet TAKE 1 TABLET BY MOUTH ONCE DAILY 3/22/19  Yes Rafael Gutierrez MD   metFORMIN (GLUCOPHAGE-XR) 500 MG extended release tablet TAKE 1 TABLET BY MOUTH 4 TIMES A DAY 2/10/19  Yes CRYSTAL Aguilar NP   hydrochlorothiazide (HYDRODIURIL) 25 MG tablet TAKE ONE HALF TABLET BY MOUTH TWICE DAILY 1/28/19  Yes Harini Savage MD   allopurinol (ZYLOPRIM) 300 MG tablet TAKE 1 TABLET BY MOUTH ONCE DAILY 1/27/19  Yes Harini Savage MD   SOTALOL  MG TABS TAKE 1 TABLET BY MOUTH TWICE DAILY 1/25/19  Yes Harini Savage MD   pioglitazone (ACTOS) 45 MG tablet TAKE 1 TABLET BY MOUTH ONCE DAILY 10/31/18  Yes Harini Savage MD   HYDROcodone-acetaminophen (NORCO) 5-325 MG per tablet Take 1 tablet by mouth every 6 hours as needed for Pain for up to 7 days. Intended supply: 7 days.  Take lowest dose possible to manage pain 4/16/21 4/23/21  Radha Sierra MD   XARELTO 20 MG TABS tablet TAKE 1 TABLET BY MOUTH EVERY DAY  Patient not taking: Reported on 3/19/2021 10/10/18   Harini Savage MD   nitroGLYCERIN (NITROSTAT) 0.4 MG SL tablet Place 1 tablet under the tongue every 5 minutes as needed for Chest pain 9/20/18   Harini Savage MD   CPAP Machine MISC by Does not apply route     Historical Provider, MD   ONE TOUCH ULTRA TEST strip TEST TWICE A DAY 9/6/17   Harini Savage MD       Scheduled Meds:   sodium chloride flush  10 mL Intravenous 2 times per day    ceFAZolin (ANCEF) IVPB  2,000 mg Intravenous Q8H    bisacodyl  5 mg Oral Daily     Continuous Infusions:   sodium chloride      sodium chloride       PRN Meds:sodium chloride flush, sodium chloride, acetaminophen, oxyCODONE **OR** oxyCODONE, HYDROmorphone **OR** HYDROmorphone, magnesium hydroxide, polyethylene glycol, ondansetron **OR** ondansetron, neomycin-bacitracin-polymyxin    Allergies   Allergen Reactions    Diflucan [Fluconazole]        Social History     Socioeconomic History    Marital status:      Spouse name: Not on file    Number of children: Not on file    Years of education: Not on file    Highest education level: Not on file   Occupational History    Not on file   Social Needs    Financial resource strain: Not on file    Food insecurity     Worry: Never true     Inability: Never true    Transportation needs     Medical: No     Non-medical: No   Tobacco Use    Smoking status: Never Smoker    Smokeless tobacco: Never Used   Substance and Sexual Activity    Alcohol use: No    Drug use: No    Sexual activity: Not on file   Lifestyle    Physical activity     Days per week: Not on file     Minutes per session: Not on file    Stress: Not on file   Relationships    Social connections     Talks on phone: Not on file     Gets together: Not on file     Attends Confucianist service: Not on file     Active member of club or organization: Not on file     Attends meetings of clubs or organizations: Not on file     Relationship status: Not on file    Intimate partner violence     Fear of current or ex partner: Not on file     Emotionally abused: Not on file     Physically abused: Not on file     Forced sexual activity: Not on file   Other Topics Concern    Not on file   Social History Narrative    Not on file       Family History   Problem Relation Age of Onset    High Blood Pressure Father     Stroke Father     Cancer Mother        Review Of Systems:   Review of Systems   Constitutional: Negative for chills, diaphoresis, fatigue and fever. HENT: Negative for sore throat and trouble swallowing. Eyes: Negative. Respiratory: Negative for cough, chest tightness, shortness of breath and wheezing.     Cardiovascular: Negative for chest pain and palpitations. Gastrointestinal: Negative for abdominal pain, constipation, diarrhea, nausea and vomiting. Endocrine: Negative. Genitourinary: Negative for dysuria, flank pain and urgency. Musculoskeletal: Negative. Skin: Negative. Neurological: Negative for dizziness, syncope, speech difficulty, weakness, numbness and headaches. Psychiatric/Behavioral: Negative.             Physical Exam:  Vitals:    04/16/21 1856 04/16/21 1915 04/16/21 1930 04/16/21 1947   BP:  (!) 189/72 (!) 175/67 (!) 168/83   Pulse:  65 50 59   Resp:       Temp:       TempSrc:       SpO2: 98% 98% (!) 89% 98%   Weight:       Height:           CONSTITUTIONAL:  awake, alert, cooperative and no apparent distress  EYES:  pupils equal, round and reactive to light and conjunctiva normal  ENT:  normocepalic, without obvious abnormality  NECK:  supple, symmetrical, trachea midline, no lymphadenopathy, thyroid not enlarged, symmetric, no tenderness, no jugular venous distension and no carotid bruits  LUNGS:  No increased work of breathing, good air exchange, clear to auscultation bilaterally, no crackles or wheezing  CARDIOVASCULAR:  bradycardic with regular rhythm  ABDOMEN:  normal bowel sounds, soft and non-distended  MUSCULOSKELETAL:  there is no redness, warmth, or swelling of the joints  NEUROLOGIC:  A&O x 3  SKIN:  no bruising or bleeding, no rashes and no jaundice    Labs:  Recent Results (from the past 24 hour(s))   TYPE AND SCREEN    Collection Time: 04/16/21 10:45 AM   Result Value Ref Range    ABO/Rh A NEG     Antibody Screen NEG    POCT Glucose    Collection Time: 04/16/21 11:03 AM   Result Value Ref Range    POC Glucose 125 (H) 60 - 115 mg/dl    Performed on ACCU-CHEK    POCT Glucose    Collection Time: 04/16/21  4:17 PM   Result Value Ref Range    POC Glucose 83 60 - 115 mg/dl    Performed on ACCU-CHEK    POCT Glucose    Collection Time: 04/16/21  4:45 PM   Result Value Ref Range    POC Glucose 69 60 - 115 mg/dl Performed [facility]      Assessment/Plan:  Principal Problem:    Lumbar stenosis with neurogenic claudication   S/P RIGHT BILATERAL L1-2, L2-3 MICRODECOMPRESSION  Feels well post-op. No pain,  Taking liquids. Plan: management per neurosurgery. Hypertension  Nifedipine. lisinopril at home  Also takes sotalol for arrhythmia. /83   No HA. Blurred vision, CP, edema or dizziness  Normal heart sounds, good pulses. Plan: resume meds. ARPITA on CPAP  Uses CPAP at home. Did not bring machine. Plan: resume at discharge. Deep vein thrombosis (DVT) of lower extremity LE DVT on right several years ago then had recurrence about 5 years ago - on xarelto. No swelling, warmth, or pain of LEs. Has held [employer] for surgery. Plan:  Resume xarelto when OK w/ neurosurgery. RAD not bothered recently. Had asthma when younger. Complains that he gets winded w stairs at times. No wheezing. Singulair, proair at  home when needed. Lungs clear, no wheezing. Plan: monitor        DM2  Oral meds. States well controlled w/  - 120    Plan: SSI,  Resume oral meds at discharge. Other hyperlipidemia  Stable on statin   Known DM2, no hx stroke or MI. Plan: continue statin. CKD  B/ Scr  29 / 1.40 w GFR 49.5,  This appears to be close to baseline Scr of 1.25   He has good UO, no edema  Plan: monitor. Labs in AM.       Arrhythmia - possibly afib,  he is unsure  On sotalol bid and xarelto. His rhythm is regular, slow. VS normal.  No dizziness, palpitations, SOB or CP. Plan:  Continue meds. Total time spent w patient for history, exam, review of records and answering questions = 60 minutes.    Electronically signed by CRYSTAL Alexandra CNP on 4/16/21 at 8:24 PM EDT

## 2021-04-17 NOTE — FLOWSHEET NOTE
Assessment done A&Ox4 Lung sounds clear LBM today. Skin tears to bilateral cheek area. Shadowing noted to dressing on lower right back area. Denies pain, nausea, or dizziness No other needs at this time Call light in reach. 1223 Perfect serve message sent to Dr Owen Gerardo in regards to patient's discharge on hold d/t patient having urinary retention. Patient voided 100cc, post void bladder scan wan 673cc. \[facility]\"" RN notified Dr Ru aRndall via phone as well. Dr Shelley Storm has been consulted. 234 [facility] with Dr Shelley Storm via phone. He is okay with patient being discharged home with velazquez leg bag and to call his office of Monday to make an apt.     1654 Discharge instruction given to patient. Informed patient to  new script at [facility] in [place]. Education given on Flomax and lumbar surgery. Taught patient how to empty his catheter leg bag. Dressings and graduate given. Patient is aware to call Dr Shelley Storm on Monday for follow up apt. Patient verbalized understanding. Wife awaiting at main entrance.

## 2021-04-17 NOTE — PROGRESS NOTES
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    Kidney stone 05/10/2013    Hematoma of kidney 05/10/2013    Deep vein thrombosis (DVT) of lower extremity (HCC) 04/22/2013    Seborrheic keratosis 04/12/2013    OA (osteoarthritis of spine) 04/08/2013    Lumbosacral radiculopathy at L3 04/08/2013    Radiculopathy of leg 04/08/2013    Tendonitis of shoulder, left 10/15/2012    Neck strain 10/15/2012    AR (allergic rhinitis) 10/15/2012    ARPTIA on CPAP 02/12/2012    Anemia 12/04/2011    Lombardi's esophagus     Hypertension     Osteoarthritis     Acquired cyst of kidney 12/31/2007    Calculus of ureter 09/28/2007    Arrhythmia 09/28/2007    Dysuria 09/28/2007    Hematuria 09/28/2007    RAD (reactive airway disease) 09/28/2007    Urinary urgency 09/28/2007    CKD (chronic kidney disease) 1996        Past Medical History:   Diagnosis Date    ? Parotiditis 09/22/2015    ? Parotiditis, ST mass just anterior to Parotid, R 10/02/2015    Allergic rhinitis     Anemia     Lombardi's esophagus     Bronchitis     Colon polyp     Deep vein thrombosis (DVT) of lower extremity (HCC) 1996    Eczema     Hypertension     Mandibular mass 09/22/2015    Osteoarthritis     RAD (reactive airway disease)     Type II or unspecified type diabetes mellitus without mention of complication, not stated as uncontrolled      Past Surgical History:   Procedure Laterality Date    BACK SURGERY  2007    removal of broken disk low back    BACK SURGERY N/A 9/2013    lower back    CARDIAC CATHETERIZATION      CHOLECYSTECTOMY  2007    CHOLECYSTECTOMY      COLONOSCOPY  11/18/08    DR HATHAWAY    COLONOSCOPY  03/27/14    DR. VELASCO    LITHOTRIPSY  2007    OTHER SURGICAL HISTORY  12/2/2013    LEVATOR ADVANCEMENT BOTH EYES    SPLENECTOMY  1949    UPPER GASTROINTESTINAL ENDOSCOPY  11/18/08    DR HATHAWAY *REPEAT IN 2 YEARS!!    UPPER GASTROINTESTINAL ENDOSCOPY  03/27/14    DR. VELASCO       Chart Reviewed: Yes  Family / Caregiver Present: No  General Comment  Comments: Pt resting in bed - agreeable to PT evaluation    Restrictions:  Restrictions/Precautions: Up as Tolerated  Position Activity Restriction  Other position/activity restrictions: \"brace when OOB\" - pt does not have brace in room with him. Case management notified. SUBJECTIVE: Subjective: \"I'm ready to go home. \"    Pain  Pre Treatment Pain Screening  Pain at present: 1  Intervention List: Patient able to continue with treatment;Patient declined any intervention  Comments / Details: in the back \"a little\"    Post Treatment Pain Screening:   Pain Assessment  Pain Assessment: (unchanged)    Prior Level of Function:  Social/Functional History  Lives With: Spouse  Type of Home: House  Home Layout: Able to Live on Main level with bedroom/bathroom, Two level  Home Access: Stairs to enter with rails  Entrance Stairs - Number of Steps: 4  Bathroom Shower/Tub: Tub/Shower unit  ADL Assistance: Independent  Homemaking Assistance: Independent  Ambulation Assistance: Independent(without AD)  Transfer Assistance: Independent    OBJECTIVE:   Vision: Within Functional Limits  Hearing: Within functional limits    Cognition:  Overall Orientation Status: Within Functional Limits  Follows Commands: Within Functional Limits    Observation/Palpation  Observation: Incision dressed. Pt pleasant and motivated. No acute distress noted.     ROM:  RLE PROM: WFL  LLE PROM: WFL    Strength:  Strength RLE  Strength RLE: WFL  Strength LLE  Strength LLE: WFL    Neuro:  Balance  Sitting - Static: Good  Sitting - Dynamic: Good  Standing - Static: Good  Standing - Dynamic: Good     Motor Control  Gross Motor?: WFL  Sensation  Overall Sensation Status: (not formally assessed)    Bed mobility  Supine to Sit: activity; assistance is required to complete the activity  Maximal assistance = pt performs 25% of the activity; assistance is required to complete the activity  Dependent = pt requires total physical assistance to accomplish the task

## 2021-04-27 ENCOUNTER — OFFICE VISIT (OUTPATIENT)
Dept: UROLOGY | Age: 76
End: 2021-04-27
Payer: MEDICARE

## 2021-04-27 VITALS
DIASTOLIC BLOOD PRESSURE: 72 MMHG | BODY MASS INDEX: 28.1 KG/M2 | OXYGEN SATURATION: 98 % | SYSTOLIC BLOOD PRESSURE: 114 MMHG | HEIGHT: 73 IN | HEART RATE: 62 BPM | WEIGHT: 212 LBS

## 2021-04-27 DIAGNOSIS — R33.9 RETENTION OF URINE: ICD-10-CM

## 2021-04-27 DIAGNOSIS — R33.9 RETENTION OF URINE: Primary | ICD-10-CM

## 2021-04-27 LAB
BILIRUBIN, POC: NORMAL
BLOOD URINE, POC: NORMAL
CLARITY, POC: CLEAR
COLOR, POC: YELLOW
GLUCOSE URINE, POC: NORMAL
KETONES, POC: NORMAL
LEUKOCYTE EST, POC: NORMAL
NITRITE, POC: NORMAL
PH, POC: 6
POST VOID RESIDUAL (PVR): 66 ML
PROTEIN, POC: NORMAL
SPECIFIC GRAVITY, POC: 1.01
UROBILINOGEN, POC: 0.2

## 2021-04-27 PROCEDURE — 81003 URINALYSIS AUTO W/O SCOPE: CPT | Performed by: UROLOGY

## 2021-04-27 PROCEDURE — 99203 OFFICE O/P NEW LOW 30 MIN: CPT | Performed by: UROLOGY

## 2021-04-27 PROCEDURE — 51798 US URINE CAPACITY MEASURE: CPT | Performed by: UROLOGY

## 2021-04-27 RX ORDER — TAMSULOSIN HYDROCHLORIDE 0.4 MG/1
0.4 CAPSULE ORAL DAILY
Qty: 90 CAPSULE | Refills: 3 | Status: SHIPPED | OUTPATIENT
Start: 2021-04-27 | End: 2022-05-04

## 2021-04-27 NOTE — PROGRESS NOTES
[facility] EVALUATION NOTE                                                 H&P                                                                                                                                                 Reason for Visit  Acute urinary retention following hip surgery    History of Present Illness  Patient here for follow-up after acute urinary retention following hip surgery  History of retention after surgeries  Currently on tamsulosin  Catheter was removed 1 week ago  Here for follow-up      Urologic Review of Systems/Symptoms  History of retention    Review of Systems  Hospitalization: Recent orthopedic surgery  All 14 categories of Review of Systems otherwise reviewed no other findings reported. Past Medical History:   Diagnosis Date    ? Parotiditis 09/22/2015    ? Parotiditis, ST mass just anterior to Parotid, R 10/02/2015    Allergic rhinitis     Anemia     Lombardi's esophagus     Bronchitis     Colon polyp     Deep vein thrombosis (DVT) of lower extremity (HCC) 1996    Eczema     Hypertension     Mandibular mass 09/22/2015    Osteoarthritis     RAD (reactive airway disease)     Type II or unspecified type diabetes mellitus without mention of complication, not stated as uncontrolled      Past Surgical History:   Procedure Laterality Date    BACK SURGERY  2007    removal of broken disk low back    BACK SURGERY N/A 9/2013    lower back    CARDIAC CATHETERIZATION      CHOLECYSTECTOMY  2007    CHOLECYSTECTOMY      COLONOSCOPY  11/18/08    DR HATHAWAY    COLONOSCOPY  03/27/14    DR. VELASCO    LAMINECTOMY N/A 4/16/2021    RIGHT BILATERAL L1-2, L2-3 MICRODECOMPRESSION performed by Liliana Chang MD at [address] [place] [address] LITHOTRIPSY  2007    OTHER SURGICAL HISTORY  12/2/2013    LEVATOR ADVANCEMENT BOTH EYES    SPLENECTOMY  1949    UPPER GASTROINTESTINAL ENDOSCOPY  11/18/08    DR HATHAWAY *REPEAT IN 2 YEARS!!    UPPER GASTROINTESTINAL ENDOSCOPY  03/27/14    DR. Alessandra Goodpasture Social History     Socioeconomic History    Marital status:      Spouse name: None    Number of children: None    Years of education: None    Highest education level: None   Occupational History    None   Social Needs    Financial resource strain: None    Food insecurity     Worry: Never true     Inability: Never true    Transportation needs     Medical: No     Non-medical: No   Tobacco Use    Smoking status: Never Smoker    Smokeless tobacco: Never Used   Substance and Sexual Activity    Alcohol use: No    Drug use: No    Sexual activity: None   Lifestyle    Physical activity     Days per week: None     Minutes per session: None    Stress: None   Relationships    Social connections     Talks on phone: None     Gets together: None     Attends Yarsani service: None     Active member of club or organization: None     Attends meetings of clubs or organizations: None     Relationship status: None    Intimate partner violence     Fear of current or ex partner: None     Emotionally abused: None     Physically abused: None     Forced sexual activity: None   Other Topics Concern    None   Social History Narrative    None     Family History   Problem Relation Age of Onset    High Blood Pressure Father     Stroke Father     Cancer Mother      Current Outpatient Medications   Medication Sig Dispense Refill    tamsulosin (FLOMAX) 0.4 MG capsule Take 1 capsule by mouth daily 90 capsule 3    tamsulosin (FLOMAX) 0.4 MG capsule Take 1 capsule by mouth daily 30 capsule 0    ADVAIR DISKUS 250-50 MCG/DOSE AEPB TAKE 1 PUFF BY MOUTH TWICE A DAY      montelukast (SINGULAIR) 10 MG tablet TAKE 1 TABLET BY MOUTH EVERY DAY AT NIGHT 90 tablet 1    atorvastatin (LIPITOR) 10 MG tablet TAKE 1 TABLET BY MOUTH ONCE DAILY 90 tablet 1    NIFEdipine (PROCARDIA XL) 60 MG extended release tablet TAKE 1 TABLET BY MOUTH ONCE DAILY 90 tablet 1    lisinopril (PRINIVIL;ZESTRIL) 40 MG tablet TAKE 1 TABLET BY MOUTH ONCE DAILY 90 tablet 1    metFORMIN (GLUCOPHAGE-XR) 500 MG extended release tablet TAKE 1 TABLET BY MOUTH 4 TIMES A  tablet 1    hydrochlorothiazide (HYDRODIURIL) 25 MG tablet TAKE ONE HALF TABLET BY MOUTH TWICE DAILY 90 tablet 1    allopurinol (ZYLOPRIM) 300 MG tablet TAKE 1 TABLET BY MOUTH ONCE DAILY 90 tablet 1    SOTALOL  MG TABS TAKE 1 TABLET BY MOUTH TWICE DAILY 180 tablet 1    pioglitazone (ACTOS) 45 MG tablet TAKE 1 TABLET BY MOUTH ONCE DAILY 90 tablet 1    XARELTO 20 MG TABS tablet TAKE 1 TABLET BY MOUTH EVERY DAY 90 tablet 1    nitroGLYCERIN (NITROSTAT) 0.4 MG SL tablet Place 1 tablet under the tongue every 5 minutes as needed for Chest pain 25 tablet 3    CPAP Machine MISC by Does not apply route       ONE TOUCH ULTRA TEST strip TEST TWICE A  strip 3     No current facility-administered medications for this visit. Diflucan [fluconazole]  All reviewed and verified by Dr Colby Reid on today's visit    PSA   Date Value Ref Range Status   10/16/2018 1.76 0.00 - 6.22 ng/mL Final     Comment:     When the Total PSA is between 3.00 and 10.00 ng/mL, consider  requesting a Free PSA to aid in diagnosis. 12/18/2017 1.72 0.00 - 6.22 ng/mL Final     Comment:     When the Total PSA is between 3.00 and 10.00 ng/mL, consider  requesting a Free PSA to aid in diagnosis. 12/12/2016 1.64 0.00 - 6.22 ng/mL Final     Comment:     When the Total PSA is between 3.00 and 10.00 ng/mL, consider  requesting a Free PSA to aid in diagnosis. 12/15/2015 1.78 0.00 - 6.22 ng/mL Final     Comment:     When the Total PSA is between 3.00 and 10.00 ng/mL, consider  requesting a Free PSA to aid in diagnosis. 11/26/2014 1.77 0.00 - 5.40 ng/mL Final     Comment:     When the Total PSA is between 3.00 and 10.00 ng/mL, consider  requesting a Free PSA to aid in diagnosis.        Results for POC orders placed in visit on 04/27/21   POCT Urinalysis No Micro (Auto)   Result Value Ref Range    Color, UA yellow     Clarity, UA clear     Glucose, UA POC neg     Bilirubin, UA neg     Ketones, UA neg     Spec Grav, UA 1.015     Blood, UA POC neg     pH, UA 6.0     Protein, UA POC neg     Urobilinogen, UA 0.2     Leukocytes, UA neg     Nitrite, UA neg    poct post void residual   Result Value Ref Range    post void residual 66 ml    Narrative    A point of care test   Post Void Residual was completed by performing  ultrasound scan of the bladder and  reviewed by Dr Grayson Console       Physical Exam  Vitals:    04/27/21 0801   BP: 114/72   Pulse: 62   SpO2: 98%   Weight: 212 lb (96.2 kg)   Height: 6' 1\" (1.854 m)     Constitutional: Not in distress. Head and Neck: Normal  Cardiovascular: Normal rate, BP reviewed. Normal  Pulmonary/Chest: Normal respiratory effort not short of breath  Abdominal: Not distended. Urologic Exam  Postvoid residual 66 cc  Urinalysis essentially clear culture sent. Assessment/Medical Necessity-Decision Making  Urinary retention following surgery  Residual acceptable  Plan  Continue tamsulosin  Long-term prescription given  Culture to be sent  Patient can follow-up with his primary doctor  Greater than 50% of 30 minutes spent consulting patient face-to-face  Orders Placed This Encounter   Procedures    Culture, Urine     Standing Status:   Future     Standing Expiration Date:   4/27/2022     Order Specific Question:   Specify (ex-cath, midstream, cysto, etc)? Answer:   m    POCT Urinalysis No Micro (Auto)    poct post void residual     Orders Placed This Encounter   Medications    tamsulosin (FLOMAX) 0.4 MG capsule     Sig: Take 1 capsule by mouth daily     Dispense:  90 capsule     Refill:  3     Isaiah Courtney MD       Please note this report has been partially produced using speech recognition software  And may cause contain errors related to that system including grammar, punctuation and spelling as well as words and phrases that may seem inappropriate.  If there are questions or concerns please feel free to contact me to clarify.

## 2021-04-29 LAB — URINE CULTURE, ROUTINE: NORMAL

## 2021-07-17 NOTE — TELEPHONE ENCOUNTER
Patient was last seen on 9-20-18  Last Prescribed 1-23-18    Medication is pending  Please approve or deny this request
no

## 2021-09-14 NOTE — PROGRESS NOTES
FETAL NON-STRESS TEST    Date/Time: 9/14/2021 4:40 PM  Performed by: Geeta Smith DO  Authorized by: Geeta Smith DO     Number of Fetuses:  1  Contractions:  Regular  Time Between Contractions:  3 minutes  Interpretation - Baby A:     Nonstress Test Interpretation: reactive    Nonstress Test - Baby A:     Variability: moderate      Decelerations: no decelerations      Accelerations: at least 15 BPM for 15 seconds      Baseline:  140       of shortness of breath. There is no cough, difficulty breathing, hemoptysis, hoarse voice, sputum production or wheezing. This is a chronic problem. The current episode started more than 1 year ago. The problem occurs intermittently. The problem has been unchanged. Associated symptoms include dyspnea on exertion. Pertinent negatives include no chest pain, headaches, PND or weight loss. His symptoms are alleviated by nothing. His symptoms are not alleviated by rest. His past medical history is significant for asthma. Past Medical History:   Diagnosis Date    ? Parotiditis 9/22/2015    ? Parotiditis, ST mass just anterior to Parotid, R 10/2/2015    Allergic rhinitis     Anemia     Lombardi's esophagus     Bronchitis     Colon polyp     Eczema     Hypertension     Mandibular mass 9/22/2015    Osteoarthritis     RAD (reactive airway disease)     Type II or unspecified type diabetes mellitus without mention of complication, not stated as uncontrolled      Past Surgical History:   Procedure Laterality Date    BACK SURGERY  2007    removal of broken disk low back    BACK SURGERY N/A 9/2013    lower back    CARDIAC CATHETERIZATION      CHOLECYSTECTOMY  2007    CHOLECYSTECTOMY      COLONOSCOPY  11/18/08    DR HATHAWAY    COLONOSCOPY  03/27/14    DR. VELASCO    LITHOTRIPSY  2007    OTHER SURGICAL HISTORY  12/2/2013    LEVATOR ADVANCEMENT BOTH EYES    SPLENECTOMY  1949    UPPER GASTROINTESTINAL ENDOSCOPY  11/18/08    DR HATHAWAY *REPEAT IN 2 YEARS!!    UPPER GASTROINTESTINAL ENDOSCOPY  03/27/14    DR. VELASCO     Family History   Problem Relation Age of Onset    High Blood Pressure Father     Stroke Father     Cancer Mother      Social History     Social History    Marital status:      Spouse name: N/A    Number of children: N/A    Years of education: N/A     Occupational History    Not on file.      Social History Main Topics    Smoking status: Never Smoker    Smokeless tobacco: Never Used    Alcohol use No    Drug use: No    Sexual activity: Not on file     Other Topics Concern    Not on file     Social History Narrative    No narrative on file     Allergies:  Diflucan [fluconazole]    Review of Systems   Constitutional: Positive for fatigue. Negative for weight loss. HENT: Negative for hoarse voice. Eyes: Negative for blurred vision. Respiratory: Positive for shortness of breath. Negative for cough, hemoptysis, sputum production, chest tightness and wheezing. Cardiovascular: Positive for dyspnea on exertion. Negative for chest pain and PND. Endocrine: Negative for polydipsia and polyphagia. Neurological: Negative for dizziness and headaches. Objective:   /74 (Site: Left Upper Arm, Position: Sitting, Cuff Size: Medium Adult)   Pulse 53   Temp 97.6 °F (36.4 °C)   Resp 16   Ht 6' 1\" (1.854 m)   Wt 216 lb (98 kg)   SpO2 97%   BMI 28.50 kg/m²     Physical Exam      PHYSICAL EXAMINATION:   VITAL SIGNS: are as recorded. GENERAL:  The patient appears well nourished and well-developed, and with normal affect. No acute respiratory distress. Alert and oriented times 3. No skin rashes. HEENT:  TMs normal bilaterally. Canals and ears normal. Pharynx is clear. Extraocular eye motions intact and pain free. Pupils reactive and equally round. Sclerae and conjunctivae clear, normocephalic and atraumatic. RESPIRATORY:  Clear and equal breath sounds with no acute respiratory distress. HEART: Regular rhythm without murmur, rub or gallop. ABDOMEN:  soft, nontender. No masses, guarding or rebound. Normoactive bowel sounds. LOW BACK: No tenderness to palpation of inferior lumbar spine or either sacroiliac joint area. Assessment:      Diagnosis Orders   1. 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  Need for influenza vaccination  INFLUENZA, HIGH DOSE, 65 YRS +, IM, PF, PREFILL SYR, 0.5ML

## 2022-05-04 RX ORDER — TAMSULOSIN HYDROCHLORIDE 0.4 MG/1
CAPSULE ORAL
Qty: 90 CAPSULE | Refills: 3 | Status: SHIPPED | OUTPATIENT
Start: 2022-05-04

## 2023-02-19 PROBLEM — S40.012A CONTUSION OF LEFT SHOULDER: Status: ACTIVE | Noted: 2023-02-19

## 2023-02-19 PROBLEM — E53.8 VITAMIN B12 DEFICIENCY (NON ANEMIC): Status: ACTIVE | Noted: 2023-02-19

## 2023-02-19 PROBLEM — S22.49XA MULTIPLE RIB FRACTURES: Status: ACTIVE | Noted: 2023-02-19

## 2023-02-19 PROBLEM — J00 NASOPHARYNGITIS: Status: ACTIVE | Noted: 2023-02-19

## 2023-02-19 PROBLEM — J94.2 HEMOTHORAX ON LEFT: Status: ACTIVE | Noted: 2023-02-19

## 2023-02-19 PROBLEM — R29.898 LEG WEAKNESS: Status: ACTIVE | Noted: 2023-02-19

## 2023-02-19 PROBLEM — M10.9 GOUT: Status: ACTIVE | Noted: 2023-02-19

## 2023-02-19 PROBLEM — I77.6 ARTERITIS (CMS-HCC): Status: ACTIVE | Noted: 2023-02-19

## 2023-02-19 PROBLEM — D64.9 ANEMIA: Status: ACTIVE | Noted: 2023-02-19

## 2023-02-19 PROBLEM — K62.5 RECTAL BLEEDING: Status: ACTIVE | Noted: 2023-02-19

## 2023-02-19 PROBLEM — Z86.718 HISTORY OF DVT (DEEP VEIN THROMBOSIS): Status: ACTIVE | Noted: 2023-02-19

## 2023-02-19 PROBLEM — R42 DIZZINESS: Status: ACTIVE | Noted: 2023-02-19

## 2023-02-19 PROBLEM — R09.81 NASAL CONGESTION WITH RHINORRHEA: Status: ACTIVE | Noted: 2023-02-19

## 2023-02-19 PROBLEM — R05.3 PERSISTENT COUGH: Status: ACTIVE | Noted: 2023-02-19

## 2023-02-19 PROBLEM — J30.9 ALLERGIC RHINITIS: Status: ACTIVE | Noted: 2023-02-19

## 2023-02-19 PROBLEM — I10 HTN (HYPERTENSION): Status: ACTIVE | Noted: 2023-02-19

## 2023-02-19 PROBLEM — J34.89 NASAL CONGESTION WITH RHINORRHEA: Status: ACTIVE | Noted: 2023-02-19

## 2023-02-19 PROBLEM — R06.02 SHORTNESS OF BREATH ON EXERTION: Status: ACTIVE | Noted: 2023-02-19

## 2023-02-19 PROBLEM — J45.909 ASTHMA (HHS-HCC): Status: ACTIVE | Noted: 2023-02-19

## 2023-02-19 PROBLEM — E11.69 DM TYPE 2 WITH DIABETIC DYSLIPIDEMIA (MULTI): Status: ACTIVE | Noted: 2023-02-19

## 2023-02-19 PROBLEM — M15.9 OSTEOARTHRITIS OF MULTIPLE JOINTS: Status: ACTIVE | Noted: 2023-02-19

## 2023-02-19 PROBLEM — S42.002A FRACTURE OF LEFT CLAVICLE: Status: ACTIVE | Noted: 2023-02-19

## 2023-02-19 PROBLEM — M27.2 MANDIBULAR ABSCESS: Status: ACTIVE | Noted: 2023-02-19

## 2023-02-19 PROBLEM — E78.5 DM TYPE 2 WITH DIABETIC DYSLIPIDEMIA (MULTI): Status: ACTIVE | Noted: 2023-02-19

## 2023-02-19 PROBLEM — R29.898 WEAKNESS OF RIGHT LOWER EXTREMITY: Status: ACTIVE | Noted: 2023-02-19

## 2023-02-19 PROBLEM — R07.89 LEFT-SIDED CHEST WALL PAIN: Status: ACTIVE | Noted: 2023-02-19

## 2023-02-19 PROBLEM — E78.5 HYPERLIPIDEMIA: Status: ACTIVE | Noted: 2023-02-19

## 2023-02-19 PROBLEM — J45.909 RAD (REACTIVE AIRWAY DISEASE) (HHS-HCC): Status: ACTIVE | Noted: 2023-02-19

## 2023-02-19 PROBLEM — L57.0 ACTINIC KERATOSES: Status: ACTIVE | Noted: 2023-02-19

## 2023-02-19 PROBLEM — R53.83 FATIGUE: Status: ACTIVE | Noted: 2023-02-19

## 2023-02-19 PROBLEM — E55.9 VITAMIN D DEFICIENCY: Status: ACTIVE | Noted: 2023-02-19

## 2023-02-19 PROBLEM — K21.9 GERD (GASTROESOPHAGEAL REFLUX DISEASE): Status: ACTIVE | Noted: 2023-02-19

## 2023-02-19 PROBLEM — K59.00 CONSTIPATION: Status: ACTIVE | Noted: 2023-02-19

## 2023-02-19 PROBLEM — M47.26 OSTEOARTHRITIS OF SPINE WITH RADICULOPATHY, LUMBAR REGION: Status: ACTIVE | Noted: 2023-02-19

## 2023-02-19 PROBLEM — K63.8219 SMALL INTESTINAL BACTERIAL OVERGROWTH: Status: ACTIVE | Noted: 2023-02-19

## 2023-02-19 PROBLEM — K92.1 HEMATOCHEZIA: Status: ACTIVE | Noted: 2023-02-19

## 2023-02-19 PROBLEM — S43.101A AC SEPARATION, RIGHT, INITIAL ENCOUNTER: Status: ACTIVE | Noted: 2023-02-19

## 2023-02-19 RX ORDER — LISINOPRIL 40 MG/1
1 TABLET ORAL DAILY
COMMUNITY
Start: 2015-01-14 | End: 2023-12-05 | Stop reason: ALTCHOICE

## 2023-02-19 RX ORDER — ESZOPICLONE 2 MG/1
1 TABLET, FILM COATED ORAL NIGHTLY PRN
COMMUNITY
Start: 2022-08-26 | End: 2023-12-13 | Stop reason: ENTERED-IN-ERROR

## 2023-02-19 RX ORDER — THEOPHYLLINE 300 MG/1
1 TABLET, EXTENDED RELEASE ORAL DAILY
COMMUNITY
Start: 2022-09-27 | End: 2023-12-13 | Stop reason: ENTERED-IN-ERROR

## 2023-02-19 RX ORDER — ALLOPURINOL 300 MG/1
1 TABLET ORAL DAILY
COMMUNITY
Start: 2015-02-10 | End: 2023-05-21

## 2023-02-19 RX ORDER — NIFEDIPINE 60 MG/1
1 TABLET, FILM COATED, EXTENDED RELEASE ORAL DAILY
COMMUNITY
End: 2023-10-03

## 2023-02-19 RX ORDER — PIOGLITAZONEHYDROCHLORIDE 45 MG/1
1 TABLET ORAL DAILY
COMMUNITY
Start: 2014-08-29 | End: 2023-03-05

## 2023-02-19 RX ORDER — SOTALOL HYDROCHLORIDE 120 MG/1
1 TABLET ORAL 2 TIMES DAILY
COMMUNITY
End: 2023-10-03

## 2023-02-19 RX ORDER — MONTELUKAST SODIUM 10 MG/1
1 TABLET ORAL NIGHTLY
COMMUNITY
Start: 2015-02-10 | End: 2023-03-05

## 2023-02-19 RX ORDER — BLOOD SUGAR DIAGNOSTIC
STRIP MISCELLANEOUS
COMMUNITY
Start: 2020-03-11 | End: 2024-01-16 | Stop reason: ENTERED-IN-ERROR

## 2023-02-19 RX ORDER — HYDROCHLOROTHIAZIDE 25 MG/1
1 TABLET ORAL 2 TIMES DAILY
COMMUNITY
Start: 2019-07-22 | End: 2023-08-14

## 2023-02-19 RX ORDER — NITROGLYCERIN 0.4 MG/1
0.4 TABLET SUBLINGUAL EVERY 5 MIN PRN
COMMUNITY
Start: 2015-10-08

## 2023-02-19 RX ORDER — ATORVASTATIN CALCIUM 10 MG/1
1 TABLET, FILM COATED ORAL DAILY
COMMUNITY
Start: 2015-02-10 | End: 2023-07-20

## 2023-02-19 RX ORDER — LANOLIN ALCOHOL/MO/W.PET/CERES
1000 CREAM (GRAM) TOPICAL DAILY
Status: ON HOLD | COMMUNITY
End: 2024-01-31 | Stop reason: ENTERED-IN-ERROR

## 2023-02-19 RX ORDER — BLOOD-GLUCOSE METER
EACH MISCELLANEOUS
COMMUNITY
Start: 2021-04-14 | End: 2023-10-11

## 2023-03-04 DIAGNOSIS — E11.69 TYPE 2 DIABETES MELLITUS WITH OTHER SPECIFIED COMPLICATION (MULTI): ICD-10-CM

## 2023-03-04 DIAGNOSIS — J30.9 ALLERGIC RHINITIS, UNSPECIFIED: ICD-10-CM

## 2023-03-05 RX ORDER — MONTELUKAST SODIUM 10 MG/1
TABLET ORAL
Qty: 90 TABLET | Refills: 1 | Status: SHIPPED | OUTPATIENT
Start: 2023-03-05 | End: 2023-11-11

## 2023-03-05 RX ORDER — PIOGLITAZONEHYDROCHLORIDE 45 MG/1
TABLET ORAL
Qty: 90 TABLET | Refills: 1 | Status: SHIPPED | OUTPATIENT
Start: 2023-03-05 | End: 2023-08-18

## 2023-03-21 PROBLEM — R19.7 DIARRHEA: Status: ACTIVE | Noted: 2023-03-21

## 2023-03-21 PROBLEM — G47.00 INSOMNIA: Status: ACTIVE | Noted: 2023-03-21

## 2023-03-21 PROBLEM — S52.501A DISTAL RADIUS FRACTURE, RIGHT: Status: ACTIVE | Noted: 2023-03-21

## 2023-03-21 PROBLEM — S52.91XA FRACTURE OF RIGHT RADIUS: Status: ACTIVE | Noted: 2023-03-21

## 2023-03-21 RX ORDER — LISINOPRIL 5 MG/1
5 TABLET ORAL DAILY
COMMUNITY
End: 2023-05-21

## 2023-03-21 RX ORDER — LANCETS
EACH MISCELLANEOUS 2 TIMES DAILY
COMMUNITY
End: 2023-10-17 | Stop reason: SDUPTHER

## 2023-03-23 ENCOUNTER — OFFICE VISIT (OUTPATIENT)
Dept: PRIMARY CARE | Facility: CLINIC | Age: 78
End: 2023-03-23
Payer: MEDICARE

## 2023-03-23 VITALS
HEART RATE: 67 BPM | BODY MASS INDEX: 28.04 KG/M2 | DIASTOLIC BLOOD PRESSURE: 62 MMHG | OXYGEN SATURATION: 97 % | SYSTOLIC BLOOD PRESSURE: 118 MMHG | WEIGHT: 207 LBS | RESPIRATION RATE: 16 BRPM | HEIGHT: 72 IN

## 2023-03-23 DIAGNOSIS — H61.21 IMPACTED CERUMEN OF RIGHT EAR: ICD-10-CM

## 2023-03-23 DIAGNOSIS — E55.9 VITAMIN D DEFICIENCY: ICD-10-CM

## 2023-03-23 DIAGNOSIS — E78.5 DM TYPE 2 WITH DIABETIC DYSLIPIDEMIA (MULTI): ICD-10-CM

## 2023-03-23 DIAGNOSIS — N18.31 STAGE 3A CHRONIC KIDNEY DISEASE (MULTI): ICD-10-CM

## 2023-03-23 DIAGNOSIS — M47.26 OSTEOARTHRITIS OF SPINE WITH RADICULOPATHY, LUMBAR REGION: Primary | ICD-10-CM

## 2023-03-23 DIAGNOSIS — I10 PRIMARY HYPERTENSION: ICD-10-CM

## 2023-03-23 DIAGNOSIS — E11.69 DM TYPE 2 WITH DIABETIC DYSLIPIDEMIA (MULTI): ICD-10-CM

## 2023-03-23 PROBLEM — I77.6 ARTERITIS (CMS-HCC): Status: RESOLVED | Noted: 2023-02-19 | Resolved: 2023-03-23

## 2023-03-23 PROCEDURE — 99214 OFFICE O/P EST MOD 30 MIN: CPT | Performed by: FAMILY MEDICINE

## 2023-03-23 NOTE — PROGRESS NOTES
Subjective   Patient ID: Francis Vang is a 77 y.o. male who presents for hearing loss righ ear (PT states he is losing hearing in right ear noticed about a week ago.) and Hearing Loss.    HPI     Review of Systems  12 Systems have been reviewed as follows.  Constitutional: Fever, weight gain, weight loss, appetite change, night sweats, fatigue, chills.  Eyes : blurry, double vision, vision, loss, tearing, redness, pain, sensitivity to light, glaucoma.  Ears, nose, mouth, and throat: Hearing loss, ringing in the ears, ear pain, nasal congestion, nasal drainage, nosebleeds, mouth, throat, irritation tooth problem.  Cardiovascular :chest pain, pressure, heart racing, palpitations, sweating, leg swelling, high or low blood pressure  Pulmonary: Cough, yellow or green sputum, blood and sputum, shortness of breath, wheezing  Gastrointestinal: Nausea, vomiting, diarrhea, constipation, pain, blood in stool, or vomitus, heartburn, difficulty swallowing  Genitourinary: incontinence, abnormal bleeding, abnormal discharge, urinary frequency, urinary hesitancy, pain, impotence sexual problem, infection, urinary retention  Musculoskeletal: Pain, stiffness, joint, redness or warmth, arthritis, back pain, weakness, muscle wasting, sprain or fracture  Neuro: Weight weakness, dizziness, change in voice, change in taste change in vision, change in hearing, loss, or change of sensation, trouble walking, balance problems coordination problems, shaking, speech problem  Endocrine , cold or heat intolerance, blood sugar problem, weight gain or loss missed periods hot flashes, sweats, change in body hair, change in libido, increased thirst, increased urination  Heme/lymph: Swelling, bleeding, problem anemia, bruising, enlarged lymph nodes  Allergic/immunologic: H. plus nasal drip, watery itchy eyes, nasal drainage, immunosuppressed  The above were reviewed and noted negative except as noted in HPI and Problem List.    Objective   BP  118/62 (BP Location: Left arm, Patient Position: Sitting, BP Cuff Size: Large adult)   Pulse 67   Resp 16   Ht 1.829 m (6')   Wt 93.9 kg (207 lb)   SpO2 97%   BMI 28.07 kg/m²     Physical Exam  Constitutional: Well developed, well nourished, alert and in no acute distress   Eyes: Normal external exam. Pupils equally round and reactive to light with normal accommodation and extraocular movements intact.  Neck: Supple, no lymphadenopathy or masses.   Cardiovascular: Regular rate and rhythm, normal S1 and S2, no murmurs, gallops, or rubs. Radial pulses normal. No peripheral edema.  Pulmonary: No respiratory distress, lungs clear to auscultation bilaterally. No wheezes, rhonchi, rales.  Abdomen: soft,non tender, non distended, without masses or HSM  Skin: Warm, well perfused, normal skin turgor and color.   Neurologic: Cranial nerves II-XII grossly intact.   Psychiatric: Mood calm and affect normal  Musculoskeletal: Moving all extremities without restriction    Assessment/Plan   Problem List Items Addressed This Visit          Nervous    Osteoarthritis of spine with radiculopathy, lumbar region - Primary    Relevant Orders    Follow Up In [facility] - PCP       Circulatory    HTN (hypertension)    Relevant Orders    Follow Up In [facility] - PCP       Genitourinary    CKD (chronic kidney disease)    Relevant Orders    Follow Up In [facility] - PCP       Endocrine/Metabolic    DM type 2 with diabetic dyslipidemia (CMS/HCC)     Continue current medications and therapy for chronic medical conditions           Relevant Orders    Follow Up In [facility] - PCP     Other Visit Diagnoses       Impacted cerumen of right ear              Continue current medications and therapy for chronic medical conditions    See prev visit    Irrigate r ear

## 2023-04-19 ENCOUNTER — LAB (OUTPATIENT)
Dept: LAB | Facility: LAB | Age: 78
End: 2023-04-19
Payer: MEDICARE

## 2023-04-19 DIAGNOSIS — E78.5 DM TYPE 2 WITH DIABETIC DYSLIPIDEMIA (MULTI): ICD-10-CM

## 2023-04-19 DIAGNOSIS — E11.69 DM TYPE 2 WITH DIABETIC DYSLIPIDEMIA (MULTI): ICD-10-CM

## 2023-04-19 DIAGNOSIS — E55.9 VITAMIN D DEFICIENCY: ICD-10-CM

## 2023-04-19 DIAGNOSIS — M47.26 OSTEOARTHRITIS OF SPINE WITH RADICULOPATHY, LUMBAR REGION: ICD-10-CM

## 2023-04-19 LAB
ALANINE AMINOTRANSFERASE (SGPT) (U/L) IN SER/PLAS: 8 U/L (ref 10–52)
ALBUMIN (G/DL) IN SER/PLAS: 3.8 G/DL (ref 3.4–5)
ALKALINE PHOSPHATASE (U/L) IN SER/PLAS: 78 U/L (ref 33–136)
ANION GAP IN SER/PLAS: 12 MMOL/L (ref 10–20)
ASPARTATE AMINOTRANSFERASE (SGOT) (U/L) IN SER/PLAS: 11 U/L (ref 9–39)
BASOPHILS (10*3/UL) IN BLOOD BY AUTOMATED COUNT: 0.09 X10E9/L (ref 0–0.1)
BASOPHILS/100 LEUKOCYTES IN BLOOD BY AUTOMATED COUNT: 1.5 % (ref 0–2)
BILIRUBIN TOTAL (MG/DL) IN SER/PLAS: 0.6 MG/DL (ref 0–1.2)
CALCIDIOL (25 OH VITAMIN D3) (NG/ML) IN SER/PLAS: 31 NG/ML
CALCIUM (MG/DL) IN SER/PLAS: 9.8 MG/DL (ref 8.6–10.3)
CARBON DIOXIDE, TOTAL (MMOL/L) IN SER/PLAS: 30 MMOL/L (ref 21–32)
CHLORIDE (MMOL/L) IN SER/PLAS: 104 MMOL/L (ref 98–107)
CHOLESTEROL (MG/DL) IN SER/PLAS: 123 MG/DL (ref 0–199)
CHOLESTEROL IN HDL (MG/DL) IN SER/PLAS: 39.4 MG/DL
CHOLESTEROL/HDL RATIO: 3.1
CREATININE (MG/DL) IN SER/PLAS: 1.7 MG/DL (ref 0.5–1.3)
EOSINOPHILS (10*3/UL) IN BLOOD BY AUTOMATED COUNT: 0.36 X10E9/L (ref 0–0.4)
EOSINOPHILS/100 LEUKOCYTES IN BLOOD BY AUTOMATED COUNT: 5.9 % (ref 0–6)
ERYTHROCYTE DISTRIBUTION WIDTH (RATIO) BY AUTOMATED COUNT: 17.7 % (ref 11.5–14.5)
ERYTHROCYTE MEAN CORPUSCULAR HEMOGLOBIN CONCENTRATION (G/DL) BY AUTOMATED: 31.8 G/DL (ref 32–36)
ERYTHROCYTE MEAN CORPUSCULAR VOLUME (FL) BY AUTOMATED COUNT: 95 FL (ref 80–100)
ERYTHROCYTES (10*6/UL) IN BLOOD BY AUTOMATED COUNT: 3.99 X10E12/L (ref 4.5–5.9)
GFR MALE: 41 ML/MIN/1.73M2
GLUCOSE (MG/DL) IN SER/PLAS: 155 MG/DL (ref 74–99)
HEMATOCRIT (%) IN BLOOD BY AUTOMATED COUNT: 38.1 % (ref 41–52)
HEMOGLOBIN (G/DL) IN BLOOD: 12.1 G/DL (ref 13.5–17.5)
IMMATURE GRANULOCYTES/100 LEUKOCYTES IN BLOOD BY AUTOMATED COUNT: 0.3 % (ref 0–0.9)
LDL: 66 MG/DL (ref 0–99)
LEUKOCYTES (10*3/UL) IN BLOOD BY AUTOMATED COUNT: 6.1 X10E9/L (ref 4.4–11.3)
LYMPHOCYTES (10*3/UL) IN BLOOD BY AUTOMATED COUNT: 1.81 X10E9/L (ref 0.8–3)
LYMPHOCYTES/100 LEUKOCYTES IN BLOOD BY AUTOMATED COUNT: 29.8 % (ref 13–44)
MONOCYTES (10*3/UL) IN BLOOD BY AUTOMATED COUNT: 0.92 X10E9/L (ref 0.05–0.8)
MONOCYTES/100 LEUKOCYTES IN BLOOD BY AUTOMATED COUNT: 15.1 % (ref 2–10)
NEUTROPHILS (10*3/UL) IN BLOOD BY AUTOMATED COUNT: 2.88 X10E9/L (ref 1.6–5.5)
NEUTROPHILS/100 LEUKOCYTES IN BLOOD BY AUTOMATED COUNT: 47.4 % (ref 40–80)
PLATELETS (10*3/UL) IN BLOOD AUTOMATED COUNT: 251 X10E9/L (ref 150–450)
POTASSIUM (MMOL/L) IN SER/PLAS: 4 MMOL/L (ref 3.5–5.3)
PROTEIN TOTAL: 6.9 G/DL (ref 6.4–8.2)
SODIUM (MMOL/L) IN SER/PLAS: 142 MMOL/L (ref 136–145)
TRIGLYCERIDE (MG/DL) IN SER/PLAS: 89 MG/DL (ref 0–149)
UREA NITROGEN (MG/DL) IN SER/PLAS: 36 MG/DL (ref 6–23)
VLDL: 18 MG/DL (ref 0–40)

## 2023-04-19 PROCEDURE — 80053 COMPREHEN METABOLIC PANEL: CPT

## 2023-04-19 PROCEDURE — 82306 VITAMIN D 25 HYDROXY: CPT

## 2023-04-19 PROCEDURE — 36415 COLL VENOUS BLD VENIPUNCTURE: CPT

## 2023-04-19 PROCEDURE — 80061 LIPID PANEL: CPT

## 2023-04-19 PROCEDURE — 85025 COMPLETE CBC W/AUTO DIFF WBC: CPT

## 2023-04-20 ENCOUNTER — LAB (OUTPATIENT)
Dept: LAB | Facility: LAB | Age: 78
End: 2023-04-20
Payer: MEDICARE

## 2023-04-20 ENCOUNTER — OFFICE VISIT (OUTPATIENT)
Dept: PRIMARY CARE | Facility: CLINIC | Age: 78
End: 2023-04-20
Payer: MEDICARE

## 2023-04-20 VITALS
HEART RATE: 48 BPM | RESPIRATION RATE: 16 BRPM | BODY MASS INDEX: 28.44 KG/M2 | DIASTOLIC BLOOD PRESSURE: 62 MMHG | WEIGHT: 210 LBS | HEIGHT: 72 IN | SYSTOLIC BLOOD PRESSURE: 118 MMHG | OXYGEN SATURATION: 96 %

## 2023-04-20 DIAGNOSIS — M54.17 LUMBOSACRAL RADICULOPATHY AT L3: Primary | ICD-10-CM

## 2023-04-20 DIAGNOSIS — N40.1 BENIGN PROSTATIC HYPERPLASIA WITH INCOMPLETE BLADDER EMPTYING: ICD-10-CM

## 2023-04-20 DIAGNOSIS — E11.69 DM TYPE 2 WITH DIABETIC DYSLIPIDEMIA (MULTI): ICD-10-CM

## 2023-04-20 DIAGNOSIS — M47.26 OSTEOARTHRITIS OF SPINE WITH RADICULOPATHY, LUMBAR REGION: ICD-10-CM

## 2023-04-20 DIAGNOSIS — R39.14 BENIGN PROSTATIC HYPERPLASIA WITH INCOMPLETE BLADDER EMPTYING: ICD-10-CM

## 2023-04-20 DIAGNOSIS — M54.10 RADICULOPATHY OF LEG: ICD-10-CM

## 2023-04-20 DIAGNOSIS — I82.5Y9 CHRONIC DEEP VEIN THROMBOSIS (DVT) OF PROXIMAL VEIN OF LOWER EXTREMITY, UNSPECIFIED LATERALITY (MULTI): ICD-10-CM

## 2023-04-20 DIAGNOSIS — E78.5 DM TYPE 2 WITH DIABETIC DYSLIPIDEMIA (MULTI): ICD-10-CM

## 2023-04-20 DIAGNOSIS — J30.1 SEASONAL ALLERGIC RHINITIS DUE TO POLLEN: ICD-10-CM

## 2023-04-20 DIAGNOSIS — N18.31 STAGE 3A CHRONIC KIDNEY DISEASE (MULTI): ICD-10-CM

## 2023-04-20 DIAGNOSIS — J45.40 MODERATE PERSISTENT REACTIVE AIRWAY DISEASE WITHOUT COMPLICATION (HHS-HCC): ICD-10-CM

## 2023-04-20 DIAGNOSIS — I10 PRIMARY HYPERTENSION: ICD-10-CM

## 2023-04-20 DIAGNOSIS — K22.70 BARRETT'S ESOPHAGUS WITHOUT DYSPLASIA: ICD-10-CM

## 2023-04-20 DIAGNOSIS — M15.9 PRIMARY OSTEOARTHRITIS INVOLVING MULTIPLE JOINTS: ICD-10-CM

## 2023-04-20 DIAGNOSIS — R29.898 WEAKNESS OF RIGHT LOWER EXTREMITY: ICD-10-CM

## 2023-04-20 DIAGNOSIS — E11.9 DIABETES MELLITUS TYPE 2 WITHOUT RETINOPATHY (MULTI): ICD-10-CM

## 2023-04-20 PROBLEM — M48.062 LUMBAR STENOSIS WITH NEUROGENIC CLAUDICATION: Status: ACTIVE | Noted: 2020-08-04

## 2023-04-20 PROBLEM — M79.604 RIGHT LEG PAIN: Status: ACTIVE | Noted: 2021-02-03

## 2023-04-20 PROBLEM — H43.813 PVD (POSTERIOR VITREOUS DETACHMENT), BOTH EYES: Status: ACTIVE | Noted: 2018-08-07

## 2023-04-20 PROBLEM — M70.21 OLECRANON BURSITIS OF RIGHT ELBOW: Status: ACTIVE | Noted: 2017-03-13

## 2023-04-20 PROBLEM — Z98.890 H/O LUMBOSACRAL SPINE SURGERY: Status: ACTIVE | Noted: 2017-09-27

## 2023-04-20 PROBLEM — M96.1 POST LAMINECTOMY SYNDROME: Status: ACTIVE | Noted: 2020-06-30

## 2023-04-20 LAB
POC HEMOGLOBIN A1C: 7.2 % (ref 4.2–6.5)
PROSTATE SPECIFIC ANTIGEN,SCREEN: 3.16 NG/ML (ref 0–4)

## 2023-04-20 PROCEDURE — 99214 OFFICE O/P EST MOD 30 MIN: CPT | Performed by: FAMILY MEDICINE

## 2023-04-20 PROCEDURE — 83036 HEMOGLOBIN GLYCOSYLATED A1C: CPT | Performed by: FAMILY MEDICINE

## 2023-04-20 PROCEDURE — 84153 ASSAY OF PSA TOTAL: CPT

## 2023-04-20 PROCEDURE — 36415 COLL VENOUS BLD VENIPUNCTURE: CPT

## 2023-04-20 RX ORDER — FINASTERIDE 5 MG/1
5 TABLET, FILM COATED ORAL DAILY
Qty: 30 TABLET | Refills: 5 | Status: SHIPPED | OUTPATIENT
Start: 2023-04-20 | End: 2023-10-25

## 2023-04-20 RX ORDER — SOLIFENACIN SUCCINATE 10 MG/1
10 TABLET, FILM COATED ORAL DAILY
Qty: 30 TABLET | Refills: 5 | Status: SHIPPED | OUTPATIENT
Start: 2023-04-20 | End: 2023-10-07

## 2023-04-20 NOTE — PROGRESS NOTES
Subjective   Patient ID: Francis Vang is a 77 y.o. male who presents for Diabetes.    HPI   Patient is following up for DM type 2. He checks glucose daily and was 114 this morning. Patient states he usually follows a generally healthy diet. Patient denies any hypoglycemic episodes.     Review of Systems  12 Systems have been reviewed as follows.  Constitutional: Fever, weight gain, weight loss, appetite change, night sweats, fatigue, chills.  Eyes : blurry, double vision, vision, loss, tearing, redness, pain, sensitivity to light, glaucoma.  Ears, nose, mouth, and throat: Hearing loss, ringing in the ears, ear pain, nasal congestion, nasal drainage, nosebleeds, mouth, throat, irritation tooth problem.  Cardiovascular :chest pain, pressure, heart racing, palpitations, sweating, leg swelling, high or low blood pressure  Pulmonary: Cough, yellow or green sputum, blood and sputum, shortness of breath, wheezing  Gastrointestinal: Nausea, vomiting, diarrhea, constipation, pain, blood in stool, or vomitus, heartburn, difficulty swallowing  Genitourinary: incontinence, abnormal bleeding, abnormal discharge, urinary frequency, urinary hesitancy, pain, impotence sexual problem, infection, urinary retention  Musculoskeletal: Pain, stiffness, joint, redness or warmth, arthritis, back pain, weakness, muscle wasting, sprain or fracture  Neuro: Weight weakness, dizziness, change in voice, change in taste change in vision, change in hearing, loss, or change of sensation, trouble walking, balance problems coordination problems, shaking, speech problem  Endocrine , cold or heat intolerance, blood sugar problem, weight gain or loss missed periods hot flashes, sweats, change in body hair, change in libido, increased thirst, increased urination  Heme/lymph: Swelling, bleeding, problem anemia, bruising, enlarged lymph nodes  Allergic/immunologic: H. plus nasal drip, watery itchy eyes, nasal drainage, immunosuppressed  The above were  reviewed and noted negative except as noted in HPI and Problem List.    Objective   /62 (Patient Position: Sitting)   Pulse (!) 48   Resp 16   Ht 1.829 m (6')   Wt 95.3 kg (210 lb)   SpO2 96%   BMI 28.48 kg/m²     Physical Exam  Constitutional: Well developed, well nourished, alert and in no acute distress   Eyes: Normal external exam. Pupils equally round and reactive to light with normal accommodation and extraocular movements intact.  Neck: Supple, no lymphadenopathy or masses.   Cardiovascular: Regular rate and rhythm, normal S1 and S2, no murmurs, gallops, or rubs. Radial pulses normal. No peripheral edema.  Pulmonary: No respiratory distress, lungs clear to auscultation bilaterally. No wheezes, rhonchi, rales.  Abdomen: soft,non tender, non distended, without masses or HSM  Skin: Warm, well perfused, normal skin turgor and color.   Neurologic: Cranial nerves II-XII grossly intact.   Psychiatric: Mood calm and affect normal  Musculoskeletal: Moving all extremities without restriction    Assessment/Plan   Problem List Items Addressed This Visit          Nervous    Osteoarthritis of spine with radiculopathy, lumbar region    Relevant Orders    Follow [facility] - PCP    Weakness of right lower extremity    Relevant Orders    Follow [facility] - PCP    Lumbosacral radiculopathy at L3 - Primary    Relevant Orders    Follow Up [facility] - PCP    Radiculopathy of leg    Relevant Orders    Follow Up [facility] - PCP       Respiratory    RAD (reactive airway disease)    Relevant Orders    Follow Up [facility] - PCP       Circulatory    HTN (hypertension)    Relevant Orders    Follow Up [facility] - PCP    Deep vein thrombosis (DVT) of lower extremity (CMS/HCC)    Relevant Orders    Follow Up [facility] - PCP       Digestive    Wilson's esophagus    Relevant Orders    Follow Up [facility] -  PCP       Genitourinary    CKD (chronic kidney disease)    Relevant Orders    Follow Up [facility] - PCP       Musculoskeletal    Osteoarthritis of multiple joints    Relevant Orders    Follow Up [facility] - PCP       Endocrine/Metabolic    DM type 2 with diabetic dyslipidemia (CMS/HCC)    Relevant Orders    POCT glycosylated hemoglobin (Hb A1C) manually resulted (Completed)    Follow Up [facility] - PCP    Diabetes mellitus type 2 without retinopathy (CMS/formerly [facility])    Relevant Orders    Follow Up [facility] - PCP       Other    Allergic rhinitis    Relevant Orders    Follow Up [facility] - PCP     Other Visit Diagnoses       Benign prostatic hyperplasia with incomplete bladder emptying        Relevant Medications    finasteride (Proscar) 5 mg tablet    solifenacin (Vesicare) 10 mg tablet    Other Relevant Orders    Follow Up [facility] - PCP    Prostate Specific Antigen, Screen    US prostate transrectal         Consider CT abd & pelvis next    See prev visit    May need LB surgery

## 2023-05-18 DIAGNOSIS — Z79.01 LONG TERM (CURRENT) USE OF ANTICOAGULANTS: ICD-10-CM

## 2023-05-18 RX ORDER — RIVAROXABAN 20 MG/1
TABLET, FILM COATED ORAL
Qty: 90 TABLET | Refills: 1 | Status: SHIPPED | OUTPATIENT
Start: 2023-05-18 | End: 2023-11-14

## 2023-05-20 DIAGNOSIS — I10 ESSENTIAL (PRIMARY) HYPERTENSION: ICD-10-CM

## 2023-05-20 DIAGNOSIS — M10.9 GOUT, UNSPECIFIED: ICD-10-CM

## 2023-05-21 RX ORDER — ALLOPURINOL 300 MG/1
TABLET ORAL
Qty: 90 TABLET | Refills: 1 | Status: SHIPPED | OUTPATIENT
Start: 2023-05-21 | End: 2023-11-11

## 2023-05-21 RX ORDER — LISINOPRIL 5 MG/1
TABLET ORAL
Qty: 90 TABLET | Refills: 1 | Status: SHIPPED | OUTPATIENT
Start: 2023-05-21 | End: 2023-12-12

## 2023-05-25 ENCOUNTER — OFFICE VISIT (OUTPATIENT)
Dept: PRIMARY CARE | Facility: CLINIC | Age: 78
End: 2023-05-25
Payer: MEDICARE

## 2023-05-25 VITALS
BODY MASS INDEX: 28.49 KG/M2 | WEIGHT: 215 LBS | RESPIRATION RATE: 16 BRPM | HEIGHT: 73 IN | SYSTOLIC BLOOD PRESSURE: 128 MMHG | HEART RATE: 75 BPM | DIASTOLIC BLOOD PRESSURE: 70 MMHG | OXYGEN SATURATION: 95 %

## 2023-05-25 DIAGNOSIS — M54.17 LUMBOSACRAL RADICULOPATHY AT L3: ICD-10-CM

## 2023-05-25 DIAGNOSIS — N40.1 BENIGN PROSTATIC HYPERPLASIA WITH INCOMPLETE BLADDER EMPTYING: ICD-10-CM

## 2023-05-25 DIAGNOSIS — E78.2 MIXED HYPERLIPIDEMIA: ICD-10-CM

## 2023-05-25 DIAGNOSIS — J45.40 MODERATE PERSISTENT REACTIVE AIRWAY DISEASE WITHOUT COMPLICATION (HHS-HCC): ICD-10-CM

## 2023-05-25 DIAGNOSIS — R39.14 BENIGN PROSTATIC HYPERPLASIA WITH INCOMPLETE BLADDER EMPTYING: ICD-10-CM

## 2023-05-25 DIAGNOSIS — E53.8 VITAMIN B12 DEFICIENCY (NON ANEMIC): ICD-10-CM

## 2023-05-25 DIAGNOSIS — J45.41 MODERATE PERSISTENT ASTHMA WITH ACUTE EXACERBATION (HHS-HCC): Primary | ICD-10-CM

## 2023-05-25 DIAGNOSIS — R53.83 FATIGUE, UNSPECIFIED TYPE: ICD-10-CM

## 2023-05-25 DIAGNOSIS — M54.10 RADICULOPATHY OF LEG: ICD-10-CM

## 2023-05-25 DIAGNOSIS — J30.1 SEASONAL ALLERGIC RHINITIS DUE TO POLLEN: ICD-10-CM

## 2023-05-25 DIAGNOSIS — N42.89 MASS OF PROSTATE DETERMINED BY ULTRASOUND: ICD-10-CM

## 2023-05-25 DIAGNOSIS — R29.898 WEAKNESS OF RIGHT LOWER EXTREMITY: ICD-10-CM

## 2023-05-25 DIAGNOSIS — N18.31 STAGE 3A CHRONIC KIDNEY DISEASE (MULTI): ICD-10-CM

## 2023-05-25 DIAGNOSIS — E11.69 DM TYPE 2 WITH DIABETIC DYSLIPIDEMIA (MULTI): ICD-10-CM

## 2023-05-25 DIAGNOSIS — I82.5Y9 CHRONIC DEEP VEIN THROMBOSIS (DVT) OF PROXIMAL VEIN OF LOWER EXTREMITY, UNSPECIFIED LATERALITY (MULTI): ICD-10-CM

## 2023-05-25 DIAGNOSIS — E78.5 DM TYPE 2 WITH DIABETIC DYSLIPIDEMIA (MULTI): ICD-10-CM

## 2023-05-25 DIAGNOSIS — M47.26 OSTEOARTHRITIS OF SPINE WITH RADICULOPATHY, LUMBAR REGION: ICD-10-CM

## 2023-05-25 DIAGNOSIS — M15.9 PRIMARY OSTEOARTHRITIS INVOLVING MULTIPLE JOINTS: ICD-10-CM

## 2023-05-25 DIAGNOSIS — E55.9 VITAMIN D DEFICIENCY: ICD-10-CM

## 2023-05-25 DIAGNOSIS — E11.9 DIABETES MELLITUS TYPE 2 WITHOUT RETINOPATHY (MULTI): ICD-10-CM

## 2023-05-25 DIAGNOSIS — I10 PRIMARY HYPERTENSION: ICD-10-CM

## 2023-05-25 DIAGNOSIS — Z86.718 HISTORY OF DVT (DEEP VEIN THROMBOSIS): ICD-10-CM

## 2023-05-25 DIAGNOSIS — K22.70 BARRETT'S ESOPHAGUS WITHOUT DYSPLASIA: ICD-10-CM

## 2023-05-25 PROBLEM — G83.10 PARESIS OF SINGLE LOWER EXTREMITY (MULTI): Status: ACTIVE | Noted: 2021-03-21

## 2023-05-25 PROBLEM — S40.019A CONTUSION OF SHOULDER REGION: Status: ACTIVE | Noted: 2021-03-21

## 2023-05-25 PROBLEM — J34.89 NASAL CONGESTION WITH RHINORRHEA: Status: RESOLVED | Noted: 2023-02-19 | Resolved: 2023-05-25

## 2023-05-25 PROBLEM — R09.81 NASAL CONGESTION WITH RHINORRHEA: Status: RESOLVED | Noted: 2023-02-19 | Resolved: 2023-05-25

## 2023-05-25 PROBLEM — K92.1 HEMATOCHEZIA: Status: RESOLVED | Noted: 2023-02-19 | Resolved: 2023-05-25

## 2023-05-25 PROBLEM — R19.7 DIARRHEA: Status: RESOLVED | Noted: 2023-03-21 | Resolved: 2023-05-25

## 2023-05-25 PROBLEM — J00 NASOPHARYNGITIS: Status: RESOLVED | Noted: 2023-02-19 | Resolved: 2023-05-25

## 2023-05-25 PROCEDURE — 3074F SYST BP LT 130 MM HG: CPT | Performed by: FAMILY MEDICINE

## 2023-05-25 PROCEDURE — 1036F TOBACCO NON-USER: CPT | Performed by: FAMILY MEDICINE

## 2023-05-25 PROCEDURE — 1160F RVW MEDS BY RX/DR IN RCRD: CPT | Performed by: FAMILY MEDICINE

## 2023-05-25 PROCEDURE — 99214 OFFICE O/P EST MOD 30 MIN: CPT | Performed by: FAMILY MEDICINE

## 2023-05-25 PROCEDURE — 3078F DIAST BP <80 MM HG: CPT | Performed by: FAMILY MEDICINE

## 2023-05-25 PROCEDURE — 1159F MED LIST DOCD IN RCRD: CPT | Performed by: FAMILY MEDICINE

## 2023-05-25 RX ORDER — CELECOXIB 200 MG/1
CAPSULE ORAL
COMMUNITY
Start: 2007-09-28 | End: 2023-12-13 | Stop reason: ENTERED-IN-ERROR

## 2023-05-25 RX ORDER — METFORMIN HYDROCHLORIDE 1000 MG/1
1000 TABLET ORAL
COMMUNITY
Start: 2010-04-06 | End: 2023-08-25 | Stop reason: SINTOL

## 2023-05-25 RX ORDER — FLUTICASONE PROPIONATE AND SALMETEROL 250; 50 UG/1; UG/1
1 POWDER RESPIRATORY (INHALATION) 2 TIMES DAILY
COMMUNITY
Start: 2020-12-18 | End: 2023-08-25 | Stop reason: ALTCHOICE

## 2023-05-25 RX ORDER — TAMSULOSIN HYDROCHLORIDE 0.4 MG/1
0.4 CAPSULE ORAL DAILY
COMMUNITY
End: 2023-12-13 | Stop reason: ENTERED-IN-ERROR

## 2023-05-25 RX ORDER — FLUTICASONE FUROATE, UMECLIDINIUM BROMIDE AND VILANTEROL TRIFENATATE 200; 62.5; 25 UG/1; UG/1; UG/1
1 POWDER RESPIRATORY (INHALATION) DAILY
Qty: 2 EACH | Refills: 0
Start: 2023-05-25 | End: 2023-08-25 | Stop reason: SDUPTHER

## 2023-05-25 ASSESSMENT — ENCOUNTER SYMPTOMS
HYPERTENSION: 1
DEPRESSION: 0
OCCASIONAL FEELINGS OF UNSTEADINESS: 1
LOSS OF SENSATION IN FEET: 0

## 2023-05-25 NOTE — PROGRESS NOTES
Subjective   Patient ID: Francis Vang is a 77 y.o. male who presents for Diabetes.    'This patient is here today to follow up on DM. This patient currently takes metformin. This patient states that their current medication treatment for this condition is working well for them as far as they are aware. This patient checks their glucose at home and states that their average glucose in the morning is about 129.  This patient denies any symptoms of headache, dizziness, blurry vision, or lightheadedness.     Review lab test    Hypertension  This is a chronic problem. The current episode started more than 1 year ago. The problem has been gradually improving since onset. The problem is controlled. Risk factors for coronary artery disease include diabetes mellitus, dyslipidemia and male gender.        Review of Systems  12 Systems have been reviewed as follows.  Constitutional: Fever, weight gain, weight loss, appetite change, night sweats, fatigue, chills.  Eyes : blurry, double vision, vision, loss, tearing, redness, pain, sensitivity to light, glaucoma.  Ears, nose, mouth, and throat: Hearing loss, ringing in the ears, ear pain, nasal congestion, nasal drainage, nosebleeds, mouth, throat, irritation tooth problem.  Cardiovascular :chest pain, pressure, heart racing, palpitations, sweating, leg swelling, high or low blood pressure  Pulmonary: Cough, yellow or green sputum, blood and sputum, shortness of breath, wheezing  Gastrointestinal: Nausea, vomiting, diarrhea, constipation, pain, blood in stool, or vomitus, heartburn, difficulty swallowing  Genitourinary: incontinence, abnormal bleeding, abnormal discharge, urinary frequency, urinary hesitancy, pain, impotence sexual problem, infection, urinary retention  Musculoskeletal: Pain, stiffness, joint, redness or warmth, arthritis, back pain, weakness, muscle wasting, sprain or fracture  Neuro: Weight weakness, dizziness, change in voice, change in taste change in  "vision, change in hearing, loss, or change of sensation, trouble walking, balance problems coordination problems, shaking, speech problem  Endocrine , cold or heat intolerance, blood sugar problem, weight gain or loss missed periods hot flashes, sweats, change in body hair, change in libido, increased thirst, increased urination  Heme/lymph: Swelling, bleeding, problem anemia, bruising, enlarged lymph nodes  Allergic/immunologic: H. plus nasal drip, watery itchy eyes, nasal drainage, immunosuppressed  The above were reviewed and noted negative except as noted in HPI and Problem List.      Objective   /70   Pulse 75   Resp 16   Ht 1.854 m (6' 1\")   Wt 97.5 kg (215 lb)   SpO2 95%   BMI 28.37 kg/m²     Physical Exam  Constitutional: Well developed, well nourished, alert and in no acute distress   Eyes: Normal external exam. Pupils equally round and reactive to light with normal accommodation and extraocular movements intact.  Neck: Supple, no lymphadenopathy or masses.   Cardiovascular: Regular rate and rhythm, normal S1 and S2, no murmurs, gallops, or rubs. Radial pulses normal. No peripheral edema.  Pulmonary: No respiratory distress, lungs clear to auscultation bilaterally. No wheezes, rhonchi, rales.  Abdomen: soft,non tender, non distended, without masses or HSM  Skin: Warm, well perfused, normal skin turgor and color.   Neurologic: Cranial nerves II-XII grossly intact.   Psychiatric: Mood calm and affect normal  Musculoskeletal: Moving all extremities without restriction    Assessment/Plan   Problem List Items Addressed This Visit          Nervous    Osteoarthritis of spine with radiculopathy, lumbar region    Relevant Orders    Follow [facility] - PCP    Follow Up [facility] - PCP    Weakness of right lower extremity    Relevant Orders    Follow Up [facility] - PCP    Follow Up [facility] - PCP    Lumbosacral radiculopathy at L3    Relevant " Orders    Follow Up [facility] - PCP    Follow Up [facility] - PCP    Radiculopathy of leg    Relevant Orders    Follow Up [facility] - PCP    Follow Up [facility] - PCP       Respiratory    Asthma - Primary    Relevant Medications    fluticasone-umeclidin-vilanter (Trelegy Ellipta) 200-62.5-25 mcg blister with device    Other Relevant Orders    Follow Up [facility] - PCP    Follow Up [facility] - PCP    RAD (reactive airway disease)    Relevant Orders    Follow Up [facility] - PCP    Follow Up [facility] - PCP       Circulatory    Primary hypertension    Relevant Orders    Follow Up [facility] - PCP    CBC and Auto Differential    Comprehensive Metabolic Panel    Follow Up [facility] - PCP    RESOLVED: Deep vein thrombosis (DVT) of lower extremity (CMS/HCC)    Relevant Orders    Follow Up [facility] - PCP    Follow Up [facility] - PCP       Digestive    Wilson's esophagus    Relevant Orders    Follow Up [facility] - PCP    Follow Up [facility] - PCP       Genitourinary    CKD (chronic kidney disease)    Relevant Orders    Follow Up [facility] - PCP    Follow Up [facility] - PCP       Musculoskeletal    Osteoarthritis of multiple joints    Relevant Orders    Follow Up [facility] - PCP    Follow Up [facility] - PCP       Endocrine/Metabolic    DM type 2 with diabetic dyslipidemia (CMS/HCC)    Relevant Orders    Follow Up [facility] - PCP    Hemoglobin A1C    Follow Up [facility] - PCP    Vitamin D deficiency    Relevant Orders    Vitamin D, Total    Follow Up [facility] - PCP    Vitamin B12 deficiency (non anemic)    Relevant Orders    Follow Up [facility] - PCP    Diabetes mellitus type 2 without retinopathy  (CMS/HCC)    Relevant Orders    Follow Up [facility] - PCP    Follow Up [facility] - PCP       Other    Fatigue    Relevant Orders    Thyroid Stimulating Hormone    Follow Up [facility] - PCP    History of DVT (deep vein thrombosis)    Relevant Orders    Follow Up [facility] - PCP    Follow Up [facility] - PCP    Hyperlipidemia    Relevant Orders    Follow Up [facility] - PCP    Lipid Panel    Follow Up [facility] - PCP    Seasonal allergic rhinitis due to pollen    Relevant Orders    Follow Up [facility] - PCP    Follow Up [facility] - PCP     Other Visit Diagnoses       Benign prostatic hyperplasia with incomplete bladder emptying        Relevant Orders    Follow Up [facility] - PCP    Follow Up [facility] - PCP    Mass of prostate determined by ultrasound        Relevant Orders    Follow Up [facility] - PCP          Continue current medications and therapy for chronic medical conditions       May need LB surgery     consider mri LS spine & restarting breztri next     BW prior    MRI prostate     Consider CT A & P next    Urine & bowel incont     Improved with vesicare & proscar

## 2023-06-24 ENCOUNTER — LAB (OUTPATIENT)
Dept: LAB | Facility: LAB | Age: 78
End: 2023-06-24
Payer: MEDICARE

## 2023-06-24 DIAGNOSIS — E78.2 MIXED HYPERLIPIDEMIA: ICD-10-CM

## 2023-06-24 DIAGNOSIS — R53.83 FATIGUE, UNSPECIFIED TYPE: ICD-10-CM

## 2023-06-24 DIAGNOSIS — E78.5 DM TYPE 2 WITH DIABETIC DYSLIPIDEMIA (MULTI): ICD-10-CM

## 2023-06-24 DIAGNOSIS — E11.69 DM TYPE 2 WITH DIABETIC DYSLIPIDEMIA (MULTI): ICD-10-CM

## 2023-06-24 DIAGNOSIS — I10 PRIMARY HYPERTENSION: ICD-10-CM

## 2023-06-24 DIAGNOSIS — E55.9 VITAMIN D DEFICIENCY: ICD-10-CM

## 2023-06-24 LAB
ALANINE AMINOTRANSFERASE (SGPT) (U/L) IN SER/PLAS: 10 U/L (ref 10–52)
ALBUMIN (G/DL) IN SER/PLAS: 4 G/DL (ref 3.4–5)
ALKALINE PHOSPHATASE (U/L) IN SER/PLAS: 87 U/L (ref 33–136)
ANION GAP IN SER/PLAS: 13 MMOL/L (ref 10–20)
ASPARTATE AMINOTRANSFERASE (SGOT) (U/L) IN SER/PLAS: 15 U/L (ref 9–39)
BASOPHILS (10*3/UL) IN BLOOD BY AUTOMATED COUNT: 0.08 X10E9/L (ref 0–0.1)
BASOPHILS/100 LEUKOCYTES IN BLOOD BY AUTOMATED COUNT: 1.4 % (ref 0–2)
BILIRUBIN TOTAL (MG/DL) IN SER/PLAS: 0.6 MG/DL (ref 0–1.2)
CALCIDIOL (25 OH VITAMIN D3) (NG/ML) IN SER/PLAS: 41 NG/ML
CALCIUM (MG/DL) IN SER/PLAS: 9.7 MG/DL (ref 8.6–10.3)
CARBON DIOXIDE, TOTAL (MMOL/L) IN SER/PLAS: 31 MMOL/L (ref 21–32)
CHLORIDE (MMOL/L) IN SER/PLAS: 103 MMOL/L (ref 98–107)
CHOLESTEROL (MG/DL) IN SER/PLAS: 119 MG/DL (ref 0–199)
CHOLESTEROL IN HDL (MG/DL) IN SER/PLAS: 40.4 MG/DL
CHOLESTEROL/HDL RATIO: 2.9
CREATININE (MG/DL) IN SER/PLAS: 1.67 MG/DL (ref 0.5–1.3)
EOSINOPHILS (10*3/UL) IN BLOOD BY AUTOMATED COUNT: 0.29 X10E9/L (ref 0–0.4)
EOSINOPHILS/100 LEUKOCYTES IN BLOOD BY AUTOMATED COUNT: 5.1 % (ref 0–6)
ERYTHROCYTE DISTRIBUTION WIDTH (RATIO) BY AUTOMATED COUNT: 17 % (ref 11.5–14.5)
ERYTHROCYTE MEAN CORPUSCULAR HEMOGLOBIN CONCENTRATION (G/DL) BY AUTOMATED: 31.5 G/DL (ref 32–36)
ERYTHROCYTE MEAN CORPUSCULAR VOLUME (FL) BY AUTOMATED COUNT: 99 FL (ref 80–100)
ERYTHROCYTES (10*6/UL) IN BLOOD BY AUTOMATED COUNT: 4.04 X10E12/L (ref 4.5–5.9)
ESTIMATED AVERAGE GLUCOSE FOR HBA1C: 163 MG/DL
GFR MALE: 42 ML/MIN/1.73M2
GLUCOSE (MG/DL) IN SER/PLAS: 125 MG/DL (ref 74–99)
HEMATOCRIT (%) IN BLOOD BY AUTOMATED COUNT: 40 % (ref 41–52)
HEMOGLOBIN (G/DL) IN BLOOD: 12.6 G/DL (ref 13.5–17.5)
HEMOGLOBIN A1C/HEMOGLOBIN TOTAL IN BLOOD: 7.3 %
IMMATURE GRANULOCYTES/100 LEUKOCYTES IN BLOOD BY AUTOMATED COUNT: 0.4 % (ref 0–0.9)
LDL: 62 MG/DL (ref 0–99)
LEUKOCYTES (10*3/UL) IN BLOOD BY AUTOMATED COUNT: 5.7 X10E9/L (ref 4.4–11.3)
LYMPHOCYTES (10*3/UL) IN BLOOD BY AUTOMATED COUNT: 1.71 X10E9/L (ref 0.8–3)
LYMPHOCYTES/100 LEUKOCYTES IN BLOOD BY AUTOMATED COUNT: 30.3 % (ref 13–44)
MONOCYTES (10*3/UL) IN BLOOD BY AUTOMATED COUNT: 0.98 X10E9/L (ref 0.05–0.8)
MONOCYTES/100 LEUKOCYTES IN BLOOD BY AUTOMATED COUNT: 17.3 % (ref 2–10)
NEUTROPHILS (10*3/UL) IN BLOOD BY AUTOMATED COUNT: 2.57 X10E9/L (ref 1.6–5.5)
NEUTROPHILS/100 LEUKOCYTES IN BLOOD BY AUTOMATED COUNT: 45.5 % (ref 40–80)
PLATELETS (10*3/UL) IN BLOOD AUTOMATED COUNT: 225 X10E9/L (ref 150–450)
POTASSIUM (MMOL/L) IN SER/PLAS: 4.4 MMOL/L (ref 3.5–5.3)
PROTEIN TOTAL: 6.9 G/DL (ref 6.4–8.2)
SODIUM (MMOL/L) IN SER/PLAS: 143 MMOL/L (ref 136–145)
THYROTROPIN (MIU/L) IN SER/PLAS BY DETECTION LIMIT <= 0.05 MIU/L: 2.62 MIU/L (ref 0.44–3.98)
TRIGLYCERIDE (MG/DL) IN SER/PLAS: 82 MG/DL (ref 0–149)
UREA NITROGEN (MG/DL) IN SER/PLAS: 26 MG/DL (ref 6–23)
VLDL: 16 MG/DL (ref 0–40)

## 2023-06-24 PROCEDURE — 84443 ASSAY THYROID STIM HORMONE: CPT

## 2023-06-24 PROCEDURE — 85025 COMPLETE CBC W/AUTO DIFF WBC: CPT

## 2023-06-24 PROCEDURE — 36415 COLL VENOUS BLD VENIPUNCTURE: CPT

## 2023-06-24 PROCEDURE — 83036 HEMOGLOBIN GLYCOSYLATED A1C: CPT

## 2023-06-24 PROCEDURE — 80061 LIPID PANEL: CPT

## 2023-06-24 PROCEDURE — 80053 COMPREHEN METABOLIC PANEL: CPT

## 2023-06-24 PROCEDURE — 82306 VITAMIN D 25 HYDROXY: CPT

## 2023-06-26 ENCOUNTER — OFFICE VISIT (OUTPATIENT)
Dept: PRIMARY CARE | Facility: CLINIC | Age: 78
End: 2023-06-26
Payer: MEDICARE

## 2023-06-26 VITALS
BODY MASS INDEX: 28.76 KG/M2 | SYSTOLIC BLOOD PRESSURE: 110 MMHG | WEIGHT: 217 LBS | HEIGHT: 73 IN | RESPIRATION RATE: 16 BRPM | DIASTOLIC BLOOD PRESSURE: 62 MMHG | HEART RATE: 60 BPM | OXYGEN SATURATION: 98 %

## 2023-06-26 DIAGNOSIS — I10 PRIMARY HYPERTENSION: ICD-10-CM

## 2023-06-26 DIAGNOSIS — E78.5 DM TYPE 2 WITH DIABETIC DYSLIPIDEMIA (MULTI): ICD-10-CM

## 2023-06-26 DIAGNOSIS — J30.1 SEASONAL ALLERGIC RHINITIS DUE TO POLLEN: ICD-10-CM

## 2023-06-26 DIAGNOSIS — N18.31 STAGE 3A CHRONIC KIDNEY DISEASE (MULTI): ICD-10-CM

## 2023-06-26 DIAGNOSIS — M15.9 PRIMARY OSTEOARTHRITIS INVOLVING MULTIPLE JOINTS: ICD-10-CM

## 2023-06-26 DIAGNOSIS — Z86.718 HISTORY OF DVT (DEEP VEIN THROMBOSIS): ICD-10-CM

## 2023-06-26 DIAGNOSIS — Z00.00 ROUTINE GENERAL MEDICAL EXAMINATION AT HEALTH CARE FACILITY: Primary | ICD-10-CM

## 2023-06-26 DIAGNOSIS — M47.26 OSTEOARTHRITIS OF SPINE WITH RADICULOPATHY, LUMBAR REGION: ICD-10-CM

## 2023-06-26 DIAGNOSIS — M54.17 LUMBOSACRAL RADICULOPATHY AT L3: ICD-10-CM

## 2023-06-26 DIAGNOSIS — E78.2 MIXED HYPERLIPIDEMIA: ICD-10-CM

## 2023-06-26 DIAGNOSIS — E11.69 DM TYPE 2 WITH DIABETIC DYSLIPIDEMIA (MULTI): ICD-10-CM

## 2023-06-26 DIAGNOSIS — I82.5Y9 CHRONIC DEEP VEIN THROMBOSIS (DVT) OF PROXIMAL VEIN OF LOWER EXTREMITY, UNSPECIFIED LATERALITY (MULTI): ICD-10-CM

## 2023-06-26 DIAGNOSIS — J45.40 MODERATE PERSISTENT REACTIVE AIRWAY DISEASE WITHOUT COMPLICATION (HHS-HCC): ICD-10-CM

## 2023-06-26 DIAGNOSIS — R29.898 WEAKNESS OF RIGHT LOWER EXTREMITY: ICD-10-CM

## 2023-06-26 DIAGNOSIS — E11.9 DIABETES MELLITUS TYPE 2 WITHOUT RETINOPATHY (MULTI): ICD-10-CM

## 2023-06-26 DIAGNOSIS — N40.1 BENIGN PROSTATIC HYPERPLASIA WITH INCOMPLETE BLADDER EMPTYING: ICD-10-CM

## 2023-06-26 DIAGNOSIS — M54.10 RADICULOPATHY OF LEG: ICD-10-CM

## 2023-06-26 DIAGNOSIS — R39.14 BENIGN PROSTATIC HYPERPLASIA WITH INCOMPLETE BLADDER EMPTYING: ICD-10-CM

## 2023-06-26 DIAGNOSIS — J45.41 MODERATE PERSISTENT ASTHMA WITH ACUTE EXACERBATION (HHS-HCC): ICD-10-CM

## 2023-06-26 DIAGNOSIS — E55.9 VITAMIN D DEFICIENCY: ICD-10-CM

## 2023-06-26 DIAGNOSIS — K22.70 BARRETT'S ESOPHAGUS WITHOUT DYSPLASIA: ICD-10-CM

## 2023-06-26 PROCEDURE — 99214 OFFICE O/P EST MOD 30 MIN: CPT | Performed by: FAMILY MEDICINE

## 2023-06-26 PROCEDURE — 1159F MED LIST DOCD IN RCRD: CPT | Performed by: FAMILY MEDICINE

## 2023-06-26 PROCEDURE — G0439 PPPS, SUBSEQ VISIT: HCPCS | Performed by: FAMILY MEDICINE

## 2023-06-26 PROCEDURE — 1160F RVW MEDS BY RX/DR IN RCRD: CPT | Performed by: FAMILY MEDICINE

## 2023-06-26 PROCEDURE — 3078F DIAST BP <80 MM HG: CPT | Performed by: FAMILY MEDICINE

## 2023-06-26 PROCEDURE — 3074F SYST BP LT 130 MM HG: CPT | Performed by: FAMILY MEDICINE

## 2023-06-26 PROCEDURE — 1036F TOBACCO NON-USER: CPT | Performed by: FAMILY MEDICINE

## 2023-06-26 ASSESSMENT — ENCOUNTER SYMPTOMS: HYPERTENSION: 1

## 2023-06-26 NOTE — PROGRESS NOTES
Subjective   Patient ID: Francis Vang is a 77 y.o. male who presents for Hypertension, Chronic Kidney Disease, and Diabetes.    Pt is here to follow up on MRI results from 5/25/23.    Hypertension  This is a chronic problem. The current episode started more than 1 year ago. The problem is controlled. The current treatment provides significant improvement.   Diabetes  He presents for his follow-up diabetic visit. He has type 2 diabetes mellitus. His disease course has been stable. Symptoms are stable. He is compliant with treatment all of the time.        Review of Systems    Objective   Wt 98.4 kg (217 lb)   BMI 28.63 kg/m²     Physical Exam    Assessment/Plan

## 2023-06-26 NOTE — PROGRESS NOTES
Subjective   Reason for Visit: Francis Vang is an 77 y.o. male here for a Medicare Wellness visit.     Past Medical, Surgical, and Family History reviewed and updated in chart.    Reviewed all medications by prescribing practitioner or clinical pharmacist (such as prescriptions, OTCs, herbal therapies and supplements) and documented in the medical record.    HPI    Patient Care Team:  Jose Manuel Estrada MD as PCP - General  Jose Manuel Estrada MD as PCP - [employer] PCP     Review of Systems  12 Systems have been reviewed as follows.  Constitutional: Fever, weight gain, weight loss, appetite change, night sweats, fatigue, chills.  Eyes : blurry, double vision, vision, loss, tearing, redness, pain, sensitivity to light, glaucoma.  Ears, nose, mouth, and throat: Hearing loss, ringing in the ears, ear pain, nasal congestion, nasal drainage, nosebleeds, mouth, throat, irritation tooth problem.  Cardiovascular :chest pain, pressure, heart racing, palpitations, sweating, leg swelling, high or low blood pressure  Pulmonary: Cough, yellow or green sputum, blood and sputum, shortness of breath, wheezing  Gastrointestinal: Nausea, vomiting, diarrhea, constipation, pain, blood in stool, or vomitus, heartburn, difficulty swallowing  Genitourinary: incontinence, abnormal bleeding, abnormal discharge, urinary frequency, urinary hesitancy, pain, impotence sexual problem, infection, urinary retention  Musculoskeletal: Pain, stiffness, joint, redness or warmth, arthritis, back pain, weakness, muscle wasting, sprain or fracture  Neuro: Weight weakness, dizziness, change in voice, change in taste change in vision, change in hearing, loss, or change of sensation, trouble walking, balance problems coordination problems, shaking, speech problem  Endocrine , cold or heat intolerance, blood sugar problem, weight gain or loss missed periods hot flashes, sweats, change in body hair, change in libido, increased thirst,  "increased urination  Heme/lymph: Swelling, bleeding, problem anemia, bruising, enlarged lymph nodes  Allergic/immunologic: H. plus nasal drip, watery itchy eyes, nasal drainage, immunosuppressed  The above were reviewed and noted negative except as noted in HPI and Problem List.    Objective   Vitals:  /62 (BP Location: Left arm, Patient Position: Sitting, BP Cuff Size: Small adult)   Pulse 60   Resp 16   Ht 1.854 m (6' 1\")   Wt 98.4 kg (217 lb)   SpO2 98%   BMI 28.63 kg/m²       Physical Exam  Constitutional: Well developed, well nourished, alert and in no acute distress   Eyes: Normal external exam. Pupils equally round and reactive to light with normal accommodation and extraocular movements intact.  Neck: Supple, no lymphadenopathy or masses.   Cardiovascular: Regular rate and rhythm, normal S1 and S2, no murmurs, gallops, or rubs. Radial pulses normal. No peripheral edema.  Pulmonary: No respiratory distress, lungs clear to auscultation bilaterally. No wheezes, rhonchi, rales.  Abdomen: soft,non tender, non distended, without masses or HSM  Skin: Warm, well perfused, normal skin turgor and color.   Neurologic: Cranial nerves II-XII grossly intact.   Psychiatric: Mood calm and affect normal  Musculoskeletal: Moving all extremities without restriction    Assessment/Plan   Problem List Items Addressed This Visit       Asthma    Relevant Orders    CBC and Auto Differential    Follow Up [facility] - PCP    DM type 2 with diabetic dyslipidemia (CMS/HCC)    Relevant Orders    CBC and Auto Differential    Comprehensive Metabolic Panel    Lipid Panel    Follow Up [facility] - PCP    History of DVT (deep vein thrombosis)    Relevant Orders    CBC and Auto Differential    Follow Up [facility] - PCP    Hyperlipidemia    Relevant Orders    CBC and Auto Differential    Follow Up [facility] - PCP    Osteoarthritis of multiple joints    Relevant Orders    CBC " and Auto Differential    Follow Up [facility] - PCP    Osteoarthritis of spine with radiculopathy, lumbar region    Relevant Orders    CBC and Auto Differential    Follow Up [facility] - PCP    RAD (reactive airway disease)    Relevant Orders    CBC and Auto Differential    Follow Up [facility] - PCP    Weakness of right lower extremity    Relevant Orders    CBC and Auto Differential    Follow Up [facility] - PCP    Vitamin D deficiency    Relevant Orders    Vitamin D, Total    Follow Up [facility] - PCP    CKD (chronic kidney disease)    Relevant Orders    CBC and Auto Differential    Follow Up [facility] - PCP    Wilson's esophagus    Relevant Orders    CBC and Auto Differential    Follow Up [facility] - PCP    Diabetes mellitus type 2 without retinopathy (CMS/HCC)    Relevant Orders    CBC and Auto Differential    Follow Up [facility] - PCP    Seasonal allergic rhinitis due to pollen    Relevant Orders    CBC and Auto Differential    Follow Up [facility] - PCP    Lumbosacral radiculopathy at L3    Relevant Orders    CBC and Auto Differential    Follow Up [facility] - PCP    Radiculopathy of leg    Relevant Orders    CBC and Auto Differential    Follow Up [facility] - PCP    Primary hypertension    Relevant Orders    CBC and Auto Differential    Follow Up [facility] - PCP     Other Visit Diagnoses       Routine general medical examination at health care facility    -  Primary    Relevant Orders    CBC and Auto Differential    Follow Up [facility] - PCP    Chronic deep vein thrombosis (DVT) of proximal vein of lower extremity, unspecified laterality (CMS/HCC)        Relevant Orders    CBC and Auto Differential    Follow Up [facility] - PCP    Benign prostatic hyperplasia with incomplete bladder emptying        Relevant  Orders    CBC and Auto Differential    Follow Up [facility] - PCP        See note 5/25    Continue current medications and therapy for chronic medical conditions    FU 3 months    BW prior    PSA q 6 months

## 2023-08-12 DIAGNOSIS — I10 ESSENTIAL (PRIMARY) HYPERTENSION: ICD-10-CM

## 2023-08-14 RX ORDER — HYDROCHLOROTHIAZIDE 25 MG/1
TABLET ORAL
Qty: 90 TABLET | Refills: 1 | Status: SHIPPED | OUTPATIENT
Start: 2023-08-14 | End: 2023-12-28

## 2023-08-18 DIAGNOSIS — E11.69 TYPE 2 DIABETES MELLITUS WITH OTHER SPECIFIED COMPLICATION (MULTI): ICD-10-CM

## 2023-08-18 RX ORDER — PIOGLITAZONEHYDROCHLORIDE 45 MG/1
TABLET ORAL
Qty: 90 TABLET | Refills: 1 | Status: SHIPPED | OUTPATIENT
Start: 2023-08-18 | End: 2024-03-25

## 2023-08-24 ENCOUNTER — LAB (OUTPATIENT)
Dept: LAB | Facility: LAB | Age: 78
End: 2023-08-24
Payer: MEDICARE

## 2023-08-24 DIAGNOSIS — I82.5Y9 CHRONIC DEEP VEIN THROMBOSIS (DVT) OF PROXIMAL VEIN OF LOWER EXTREMITY, UNSPECIFIED LATERALITY (MULTI): ICD-10-CM

## 2023-08-24 DIAGNOSIS — Z00.00 ROUTINE GENERAL MEDICAL EXAMINATION AT HEALTH CARE FACILITY: ICD-10-CM

## 2023-08-24 DIAGNOSIS — M47.26 OSTEOARTHRITIS OF SPINE WITH RADICULOPATHY, LUMBAR REGION: ICD-10-CM

## 2023-08-24 DIAGNOSIS — J30.1 SEASONAL ALLERGIC RHINITIS DUE TO POLLEN: ICD-10-CM

## 2023-08-24 DIAGNOSIS — M54.10 RADICULOPATHY OF LEG: ICD-10-CM

## 2023-08-24 DIAGNOSIS — K22.70 BARRETT'S ESOPHAGUS WITHOUT DYSPLASIA: ICD-10-CM

## 2023-08-24 DIAGNOSIS — E78.5 DM TYPE 2 WITH DIABETIC DYSLIPIDEMIA (MULTI): ICD-10-CM

## 2023-08-24 DIAGNOSIS — N18.31 STAGE 3A CHRONIC KIDNEY DISEASE (MULTI): ICD-10-CM

## 2023-08-24 DIAGNOSIS — J45.41 MODERATE PERSISTENT ASTHMA WITH ACUTE EXACERBATION (HHS-HCC): ICD-10-CM

## 2023-08-24 DIAGNOSIS — R29.898 WEAKNESS OF RIGHT LOWER EXTREMITY: ICD-10-CM

## 2023-08-24 DIAGNOSIS — N40.1 BENIGN PROSTATIC HYPERPLASIA WITH INCOMPLETE BLADDER EMPTYING: ICD-10-CM

## 2023-08-24 DIAGNOSIS — E78.2 MIXED HYPERLIPIDEMIA: ICD-10-CM

## 2023-08-24 DIAGNOSIS — M15.9 PRIMARY OSTEOARTHRITIS INVOLVING MULTIPLE JOINTS: ICD-10-CM

## 2023-08-24 DIAGNOSIS — I10 PRIMARY HYPERTENSION: ICD-10-CM

## 2023-08-24 DIAGNOSIS — J45.40 MODERATE PERSISTENT REACTIVE AIRWAY DISEASE WITHOUT COMPLICATION (HHS-HCC): ICD-10-CM

## 2023-08-24 DIAGNOSIS — E55.9 VITAMIN D DEFICIENCY: ICD-10-CM

## 2023-08-24 DIAGNOSIS — Z86.718 HISTORY OF DVT (DEEP VEIN THROMBOSIS): ICD-10-CM

## 2023-08-24 DIAGNOSIS — E11.9 DIABETES MELLITUS TYPE 2 WITHOUT RETINOPATHY (MULTI): ICD-10-CM

## 2023-08-24 DIAGNOSIS — E11.69 DM TYPE 2 WITH DIABETIC DYSLIPIDEMIA (MULTI): ICD-10-CM

## 2023-08-24 DIAGNOSIS — M54.17 LUMBOSACRAL RADICULOPATHY AT L3: ICD-10-CM

## 2023-08-24 DIAGNOSIS — R39.14 BENIGN PROSTATIC HYPERPLASIA WITH INCOMPLETE BLADDER EMPTYING: ICD-10-CM

## 2023-08-24 LAB
ALANINE AMINOTRANSFERASE (SGPT) (U/L) IN SER/PLAS: 10 U/L (ref 10–52)
ALBUMIN (G/DL) IN SER/PLAS: 3.8 G/DL (ref 3.4–5)
ALKALINE PHOSPHATASE (U/L) IN SER/PLAS: 72 U/L (ref 33–136)
ANION GAP IN SER/PLAS: 10 MMOL/L (ref 10–20)
ASPARTATE AMINOTRANSFERASE (SGOT) (U/L) IN SER/PLAS: 14 U/L (ref 9–39)
BASOPHILS (10*3/UL) IN BLOOD BY AUTOMATED COUNT: 0.08 X10E9/L (ref 0–0.1)
BASOPHILS/100 LEUKOCYTES IN BLOOD BY AUTOMATED COUNT: 1.4 % (ref 0–2)
BILIRUBIN TOTAL (MG/DL) IN SER/PLAS: 0.6 MG/DL (ref 0–1.2)
CALCIDIOL (25 OH VITAMIN D3) (NG/ML) IN SER/PLAS: 38 NG/ML
CALCIUM (MG/DL) IN SER/PLAS: 9.5 MG/DL (ref 8.6–10.3)
CARBON DIOXIDE, TOTAL (MMOL/L) IN SER/PLAS: 33 MMOL/L (ref 21–32)
CHLORIDE (MMOL/L) IN SER/PLAS: 103 MMOL/L (ref 98–107)
CHOLESTEROL (MG/DL) IN SER/PLAS: 98 MG/DL (ref 0–199)
CHOLESTEROL IN HDL (MG/DL) IN SER/PLAS: 36.9 MG/DL
CHOLESTEROL/HDL RATIO: 2.7
CREATININE (MG/DL) IN SER/PLAS: 1.74 MG/DL (ref 0.5–1.3)
EOSINOPHILS (10*3/UL) IN BLOOD BY AUTOMATED COUNT: 0.36 X10E9/L (ref 0–0.4)
EOSINOPHILS/100 LEUKOCYTES IN BLOOD BY AUTOMATED COUNT: 6.1 % (ref 0–6)
ERYTHROCYTE DISTRIBUTION WIDTH (RATIO) BY AUTOMATED COUNT: 16.7 % (ref 11.5–14.5)
ERYTHROCYTE MEAN CORPUSCULAR HEMOGLOBIN CONCENTRATION (G/DL) BY AUTOMATED: 31.8 G/DL (ref 32–36)
ERYTHROCYTE MEAN CORPUSCULAR VOLUME (FL) BY AUTOMATED COUNT: 100 FL (ref 80–100)
ERYTHROCYTES (10*6/UL) IN BLOOD BY AUTOMATED COUNT: 3.97 X10E12/L (ref 4.5–5.9)
ESTIMATED AVERAGE GLUCOSE FOR HBA1C: 157 MG/DL
GFR MALE: 40 ML/MIN/1.73M2
GLUCOSE (MG/DL) IN SER/PLAS: 125 MG/DL (ref 74–99)
HEMATOCRIT (%) IN BLOOD BY AUTOMATED COUNT: 39.6 % (ref 41–52)
HEMOGLOBIN (G/DL) IN BLOOD: 12.6 G/DL (ref 13.5–17.5)
HEMOGLOBIN A1C/HEMOGLOBIN TOTAL IN BLOOD: 7.1 %
IMMATURE GRANULOCYTES/100 LEUKOCYTES IN BLOOD BY AUTOMATED COUNT: 0.2 % (ref 0–0.9)
LDL: 47 MG/DL (ref 0–99)
LEUKOCYTES (10*3/UL) IN BLOOD BY AUTOMATED COUNT: 5.9 X10E9/L (ref 4.4–11.3)
LYMPHOCYTES (10*3/UL) IN BLOOD BY AUTOMATED COUNT: 1.95 X10E9/L (ref 0.8–3)
LYMPHOCYTES/100 LEUKOCYTES IN BLOOD BY AUTOMATED COUNT: 33.1 % (ref 13–44)
MONOCYTES (10*3/UL) IN BLOOD BY AUTOMATED COUNT: 0.85 X10E9/L (ref 0.05–0.8)
MONOCYTES/100 LEUKOCYTES IN BLOOD BY AUTOMATED COUNT: 14.4 % (ref 2–10)
NEUTROPHILS (10*3/UL) IN BLOOD BY AUTOMATED COUNT: 2.65 X10E9/L (ref 1.6–5.5)
NEUTROPHILS/100 LEUKOCYTES IN BLOOD BY AUTOMATED COUNT: 44.8 % (ref 40–80)
PLATELETS (10*3/UL) IN BLOOD AUTOMATED COUNT: 204 X10E9/L (ref 150–450)
POTASSIUM (MMOL/L) IN SER/PLAS: 4.2 MMOL/L (ref 3.5–5.3)
PROTEIN TOTAL: 6.7 G/DL (ref 6.4–8.2)
SODIUM (MMOL/L) IN SER/PLAS: 142 MMOL/L (ref 136–145)
TRIGLYCERIDE (MG/DL) IN SER/PLAS: 72 MG/DL (ref 0–149)
UREA NITROGEN (MG/DL) IN SER/PLAS: 24 MG/DL (ref 6–23)
VLDL: 14 MG/DL (ref 0–40)

## 2023-08-24 PROCEDURE — 85025 COMPLETE CBC W/AUTO DIFF WBC: CPT

## 2023-08-24 PROCEDURE — 82306 VITAMIN D 25 HYDROXY: CPT

## 2023-08-24 PROCEDURE — 80053 COMPREHEN METABOLIC PANEL: CPT

## 2023-08-24 PROCEDURE — 80061 LIPID PANEL: CPT

## 2023-08-24 PROCEDURE — 83036 HEMOGLOBIN GLYCOSYLATED A1C: CPT

## 2023-08-24 PROCEDURE — 36415 COLL VENOUS BLD VENIPUNCTURE: CPT

## 2023-08-24 ASSESSMENT — ENCOUNTER SYMPTOMS
BOWEL INCONTINENCE: 0
ABDOMINAL PAIN: 0
PARESIS: 0
WEIGHT LOSS: 0
HEADACHES: 0
FEVER: 0
PERIANAL NUMBNESS: 0
PARESTHESIAS: 0
LEG PAIN: 1
DYSURIA: 0
NUMBNESS: 1
TINGLING: 0
BACK PAIN: 1
WEAKNESS: 1

## 2023-08-25 ENCOUNTER — OFFICE VISIT (OUTPATIENT)
Dept: PRIMARY CARE | Facility: CLINIC | Age: 78
End: 2023-08-25
Payer: MEDICARE

## 2023-08-25 VITALS
HEIGHT: 72 IN | HEART RATE: 56 BPM | WEIGHT: 221 LBS | SYSTOLIC BLOOD PRESSURE: 118 MMHG | BODY MASS INDEX: 29.93 KG/M2 | DIASTOLIC BLOOD PRESSURE: 62 MMHG | OXYGEN SATURATION: 95 % | RESPIRATION RATE: 16 BRPM

## 2023-08-25 DIAGNOSIS — R29.898 WEAKNESS OF LEFT LEG: ICD-10-CM

## 2023-08-25 DIAGNOSIS — N18.32 STAGE 3B CHRONIC KIDNEY DISEASE (MULTI): ICD-10-CM

## 2023-08-25 DIAGNOSIS — M48.07 SPINAL STENOSIS OF LUMBOSACRAL REGION: ICD-10-CM

## 2023-08-25 DIAGNOSIS — I10 PRIMARY HYPERTENSION: ICD-10-CM

## 2023-08-25 DIAGNOSIS — M47.26 OSTEOARTHRITIS OF SPINE WITH RADICULOPATHY, LUMBAR REGION: ICD-10-CM

## 2023-08-25 DIAGNOSIS — E11.9 DIABETES MELLITUS TYPE 2 WITHOUT RETINOPATHY (MULTI): ICD-10-CM

## 2023-08-25 DIAGNOSIS — J45.41 MODERATE PERSISTENT ASTHMA WITH ACUTE EXACERBATION (HHS-HCC): ICD-10-CM

## 2023-08-25 DIAGNOSIS — E11.69 DM TYPE 2 WITH DIABETIC DYSLIPIDEMIA (MULTI): ICD-10-CM

## 2023-08-25 DIAGNOSIS — E78.5 DM TYPE 2 WITH DIABETIC DYSLIPIDEMIA (MULTI): ICD-10-CM

## 2023-08-25 PROBLEM — K63.8219 SMALL INTESTINAL BACTERIAL OVERGROWTH: Status: RESOLVED | Noted: 2023-02-19 | Resolved: 2023-08-25

## 2023-08-25 PROCEDURE — 1036F TOBACCO NON-USER: CPT | Performed by: FAMILY MEDICINE

## 2023-08-25 PROCEDURE — 1160F RVW MEDS BY RX/DR IN RCRD: CPT | Performed by: FAMILY MEDICINE

## 2023-08-25 PROCEDURE — 3074F SYST BP LT 130 MM HG: CPT | Performed by: FAMILY MEDICINE

## 2023-08-25 PROCEDURE — 3078F DIAST BP <80 MM HG: CPT | Performed by: FAMILY MEDICINE

## 2023-08-25 PROCEDURE — 1159F MED LIST DOCD IN RCRD: CPT | Performed by: FAMILY MEDICINE

## 2023-08-25 PROCEDURE — 99214 OFFICE O/P EST MOD 30 MIN: CPT | Performed by: FAMILY MEDICINE

## 2023-08-25 RX ORDER — FLUTICASONE FUROATE, UMECLIDINIUM BROMIDE AND VILANTEROL TRIFENATATE 200; 62.5; 25 UG/1; UG/1; UG/1
1 POWDER RESPIRATORY (INHALATION) DAILY
Qty: 3 EACH | Refills: 1 | Status: SHIPPED | OUTPATIENT
Start: 2023-08-25 | End: 2024-01-16 | Stop reason: ENTERED-IN-ERROR

## 2023-08-25 ASSESSMENT — ENCOUNTER SYMPTOMS
TINGLING: 0
PARESTHESIAS: 0
WEIGHT LOSS: 0
DYSURIA: 0
LEG PAIN: 1
NUMBNESS: 1
BACK PAIN: 1
BOWEL INCONTINENCE: 0
ABDOMINAL PAIN: 0
PERIANAL NUMBNESS: 0
HEADACHES: 0
FEVER: 0
PARESIS: 0
WEAKNESS: 1

## 2023-08-25 NOTE — PROGRESS NOTES
Subjective   Patient ID: Francis Vang is a 77 y.o. male who presents for Back Pain.    Back Pain  This is a chronic problem. The current episode started more than 1 year ago. The problem occurs intermittently. The problem has been gradually worsening since onset. The pain is present in the sacro-iliac. The quality of the pain is described as aching. The pain radiates to the right knee, right thigh and left foot (states the pain causes his left leg to go numb). The pain is at a severity of 6/10. The pain is Worse during the day. The symptoms are aggravated by bending and standing. Stiffness is present All day. Associated symptoms include leg pain, numbness, pelvic pain and weakness. Pertinent negatives include no abdominal pain, bladder incontinence, bowel incontinence, chest pain, dysuria, fever, headaches, paresis, paresthesias, perianal numbness, tingling or weight loss.        Review of Systems   Constitutional:  Negative for fever and weight loss.   Cardiovascular:  Negative for chest pain.   Gastrointestinal:  Negative for abdominal pain and bowel incontinence.   Genitourinary:  Positive for pelvic pain. Negative for bladder incontinence and dysuria.   Musculoskeletal:  Positive for back pain.   Neurological:  Positive for weakness and numbness. Negative for tingling, headaches and paresthesias.     12 Systems have been reviewed as follows.  Constitutional: Fever, weight gain, weight loss, appetite change, night sweats, fatigue, chills.  Eyes : blurry, double vision, vision, loss, tearing, redness, pain, sensitivity to light, glaucoma.  Ears, nose, mouth, and throat: Hearing loss, ringing in the ears, ear pain, nasal congestion, nasal drainage, nosebleeds, mouth, throat, irritation tooth problem.  Cardiovascular :chest pain, pressure, heart racing, palpitations, sweating, leg swelling, high or low blood pressure  Pulmonary: Cough, yellow or green sputum, blood and sputum, shortness of breath,  wheezing  Gastrointestinal: Nausea, vomiting, diarrhea, constipation, pain, blood in stool, or vomitus, heartburn, difficulty swallowing  Genitourinary: incontinence, abnormal bleeding, abnormal discharge, urinary frequency, urinary hesitancy, pain, impotence sexual problem, infection, urinary retention  Musculoskeletal: Pain, stiffness, joint, redness or warmth, arthritis, back pain, weakness, muscle wasting, sprain or fracture  Neuro: Weight weakness, dizziness, change in voice, change in taste change in vision, change in hearing, loss, or change of sensation, trouble walking, balance problems coordination problems, shaking, speech problem  Endocrine , cold or heat intolerance, blood sugar problem, weight gain or loss missed periods hot flashes, sweats, change in body hair, change in libido, increased thirst, increased urination  Heme/lymph: Swelling, bleeding, problem anemia, bruising, enlarged lymph nodes  Allergic/immunologic: H. plus nasal drip, watery itchy eyes, nasal drainage, immunosuppressed  The above were reviewed and noted negative except as noted in HPI and Problem List.      Objective   /62   Pulse 56   Resp 16   Ht 1.829 m (6')   Wt 100 kg (221 lb)   SpO2 95%   BMI 29.97 kg/m²     Physical Exam  Constitutional: Well developed, well nourished, alert and in no acute distress   Eyes: Normal external exam. Pupils equally round and reactive to light with normal accommodation and extraocular movements intact.  Neck: Supple, no lymphadenopathy or masses.   Cardiovascular: Regular rate and rhythm, normal S1 and S2, no murmurs, gallops, or rubs. Radial pulses normal. No peripheral edema.  Pulmonary: No respiratory distress, lungs clear to auscultation bilaterally. No wheezes, rhonchi, rales.  Abdomen: soft,non tender, non distended, without masses or HSM  Skin: Warm, well perfused, normal skin turgor and color.   Neurologic: Cranial nerves II-XII grossly intact.   Psychiatric: Mood calm and  affect normal  Musculoskeletal: Moving all extremities without restriction  , weakness left leg      Assessment/Plan   Problem List Items Addressed This Visit       Asthma    Relevant Medications    fluticasone-umeclidin-vilanter (Trelegy Ellipta) 200-62.5-25 mcg blister with device    DM type 2 with diabetic dyslipidemia (CMS/[facility])    Relevant Orders    Follow Up In [facility] - PCP - Established    Osteoarthritis of spine with radiculopathy, lumbar region    Relevant Orders    MR lumbar spine wo IV contrast    XR lumbar spine complete 4+ views    Follow Up In [facility] - PCP - Established    CKD (chronic kidney disease)    Relevant Orders    Follow Up In [facility] - PCP - Established    Diabetes mellitus type 2 without retinopathy (CMS/[facility])    Relevant Orders    Follow Up In [facility] - PCP - Established    Primary hypertension    Relevant Orders    Follow Up In [facility] - PCP - Established     Other Visit Diagnoses       Spinal stenosis of lumbosacral region        Relevant Orders    MR lumbar spine wo IV contrast    Follow Up In [facility] - PCP - Established    Weakness of left leg        Relevant Orders    MR lumbar spine wo IV contrast    Follow Up In [facility] - PCP - Established               Continue current medications and therapy for chronic medical conditions    Provider Attestation - Scribe documentation    All medical record entries made by the Scribe were at my direction and personally dictated by me. I have reviewed the chart and agree that the record accurately reflects my personal performance of the history, physical exam, discussion and plan.      Scribe Attestation  By signing my name below, I, Jose Manuel Estrada MD   , Scribe   attest that this documentation has been prepared under the direction and in the presence of Jose Manuel Estrada MD.      PSA q 6 months     may need LB surgery      consider  restarting breztri next       Consider CT A & P next       MRI prostate WNL    Urine & bowel incont     Improved with vesicare & proscar     Patient failed PT,OT,ice,NSAIDs,prednisone, and pain management.patient continues with significant symptoms of weakness left leg and back pain. Patient is a candidate for MRI lumbar spine.    Could not tolerate metformin

## 2023-09-12 DIAGNOSIS — E78.5 HYPERLIPIDEMIA, UNSPECIFIED: ICD-10-CM

## 2023-09-12 DIAGNOSIS — E11.69 TYPE 2 DIABETES MELLITUS WITH OTHER SPECIFIED COMPLICATION (MULTI): ICD-10-CM

## 2023-09-12 RX ORDER — EMPAGLIFLOZIN 25 MG/1
25 TABLET, FILM COATED ORAL DAILY
Qty: 90 TABLET | Refills: 1 | Status: SHIPPED | OUTPATIENT
Start: 2023-09-12 | End: 2024-03-25

## 2023-09-25 ASSESSMENT — ENCOUNTER SYMPTOMS
WEAKNESS: 1
PARESTHESIAS: 0
PERIANAL NUMBNESS: 0
TINGLING: 0
DYSURIA: 0
HEADACHES: 0
PARESIS: 0
LEG PAIN: 1
FEVER: 0
WEIGHT LOSS: 0
BACK PAIN: 1
ABDOMINAL PAIN: 0
NUMBNESS: 0
BOWEL INCONTINENCE: 0

## 2023-09-26 ENCOUNTER — OFFICE VISIT (OUTPATIENT)
Dept: PRIMARY CARE | Facility: CLINIC | Age: 78
End: 2023-09-26
Payer: MEDICARE

## 2023-09-26 VITALS
HEIGHT: 73 IN | HEART RATE: 56 BPM | BODY MASS INDEX: 29.16 KG/M2 | SYSTOLIC BLOOD PRESSURE: 130 MMHG | OXYGEN SATURATION: 96 % | WEIGHT: 220 LBS | DIASTOLIC BLOOD PRESSURE: 64 MMHG

## 2023-09-26 DIAGNOSIS — R29.898 WEAKNESS OF LEFT LEG: ICD-10-CM

## 2023-09-26 DIAGNOSIS — E11.9 DIABETES MELLITUS TYPE 2 WITHOUT RETINOPATHY (MULTI): ICD-10-CM

## 2023-09-26 DIAGNOSIS — M48.07 SPINAL STENOSIS OF LUMBOSACRAL REGION: ICD-10-CM

## 2023-09-26 DIAGNOSIS — E11.69 DM TYPE 2 WITH DIABETIC DYSLIPIDEMIA (MULTI): ICD-10-CM

## 2023-09-26 DIAGNOSIS — N18.32 STAGE 3B CHRONIC KIDNEY DISEASE (MULTI): ICD-10-CM

## 2023-09-26 DIAGNOSIS — E78.5 DM TYPE 2 WITH DIABETIC DYSLIPIDEMIA (MULTI): ICD-10-CM

## 2023-09-26 DIAGNOSIS — I10 PRIMARY HYPERTENSION: ICD-10-CM

## 2023-09-26 DIAGNOSIS — M47.26 OSTEOARTHRITIS OF SPINE WITH RADICULOPATHY, LUMBAR REGION: ICD-10-CM

## 2023-09-26 DIAGNOSIS — R29.818 NEUROGENIC CLAUDICATION: ICD-10-CM

## 2023-09-26 DIAGNOSIS — Z23 NEED FOR INFLUENZA VACCINATION: ICD-10-CM

## 2023-09-26 PROBLEM — H52.223 REGULAR ASTIGMATISM OF BOTH EYES: Status: ACTIVE | Noted: 2023-08-29

## 2023-09-26 PROBLEM — R06.02 SHORTNESS OF BREATH ON EXERTION: Status: RESOLVED | Noted: 2023-02-19 | Resolved: 2023-09-26

## 2023-09-26 PROBLEM — S40.019A CONTUSION OF SHOULDER REGION: Status: RESOLVED | Noted: 2021-03-21 | Resolved: 2023-09-26

## 2023-09-26 PROBLEM — R07.89 LEFT-SIDED CHEST WALL PAIN: Status: RESOLVED | Noted: 2023-02-19 | Resolved: 2023-09-26

## 2023-09-26 PROBLEM — R05.3 PERSISTENT COUGH: Status: RESOLVED | Noted: 2023-02-19 | Resolved: 2023-09-26

## 2023-09-26 PROCEDURE — G0008 ADMIN INFLUENZA VIRUS VAC: HCPCS | Performed by: FAMILY MEDICINE

## 2023-09-26 PROCEDURE — 90662 IIV NO PRSV INCREASED AG IM: CPT | Performed by: FAMILY MEDICINE

## 2023-09-26 PROCEDURE — 99214 OFFICE O/P EST MOD 30 MIN: CPT | Performed by: FAMILY MEDICINE

## 2023-09-26 ASSESSMENT — ENCOUNTER SYMPTOMS
HEADACHES: 0
PARESTHESIAS: 0
BACK PAIN: 1
NUMBNESS: 0
LEG PAIN: 1
WEIGHT LOSS: 0
BOWEL INCONTINENCE: 0
WEAKNESS: 1
DYSURIA: 0
FEVER: 0
PERIANAL NUMBNESS: 0
ABDOMINAL PAIN: 0
TINGLING: 0
PARESIS: 0

## 2023-09-26 NOTE — PROGRESS NOTES
Subjective   Patient ID: Francis Vang is a 77 y.o. male who presents for Back Pain.    This patient is here today to follow up on DM. This patient currently takes jardiance . This patient states that their current medication treatment for this condition is working well for them as far as they are aware. This patient checks their glucose at home and states that their average glucose in the morning is about 138 .  This patient denies any symptoms of headache, dizziness, blurry vision, or lightheadedness. Last A1c 7.1         Back Pain  This is a recurrent problem. The current episode started more than 1 year ago. The problem occurs daily. The problem has been gradually worsening since onset. The pain is present in the sacro-iliac and lumbar spine. The quality of the pain is described as aching. The pain radiates to the right thigh. The pain is at a severity of 7/10. The pain is Worse during the day. The symptoms are aggravated by standing. Stiffness is present All day. Associated symptoms include leg pain, pelvic pain and weakness. Pertinent negatives include no abdominal pain, bladder incontinence, bowel incontinence, chest pain, dysuria, fever, headaches, numbness, paresis, paresthesias, perianal numbness, tingling or weight loss. He has tried nothing for the symptoms.        Review of Systems   Constitutional:  Negative for fever and weight loss.   Cardiovascular:  Negative for chest pain.   Gastrointestinal:  Negative for abdominal pain and bowel incontinence.   Genitourinary:  Positive for pelvic pain. Negative for bladder incontinence and dysuria.   Musculoskeletal:  Positive for back pain.   Neurological:  Positive for weakness. Negative for tingling, numbness, headaches and paresthesias.     12 Systems have been reviewed as follows.  Constitutional: Fever, weight gain, weight loss, appetite change, night sweats, fatigue, chills.  Eyes : blurry, double vision, vision, loss, tearing, redness, pain,  "sensitivity to light, glaucoma.  Ears, nose, mouth, and throat: Hearing loss, ringing in the ears, ear pain, nasal congestion, nasal drainage, nosebleeds, mouth, throat, irritation tooth problem.  Cardiovascular :chest pain, pressure, heart racing, palpitations, sweating, leg swelling, high or low blood pressure  Pulmonary: Cough, yellow or green sputum, blood and sputum, shortness of breath, wheezing  Gastrointestinal: Nausea, vomiting, diarrhea, constipation, pain, blood in stool, or vomitus, heartburn, difficulty swallowing  Genitourinary: incontinence, abnormal bleeding, abnormal discharge, urinary frequency, urinary hesitancy, pain, impotence sexual problem, infection, urinary retention  Musculoskeletal: Pain, stiffness, joint, redness or warmth, arthritis, back pain, weakness, muscle wasting, sprain or fracture  Neuro: Weight weakness, dizziness, change in voice, change in taste change in vision, change in hearing, loss, or change of sensation, trouble walking, balance problems coordination problems, shaking, speech problem  Endocrine , cold or heat intolerance, blood sugar problem, weight gain or loss missed periods hot flashes, sweats, change in body hair, change in libido, increased thirst, increased urination  Heme/lymph: Swelling, bleeding, problem anemia, bruising, enlarged lymph nodes  Allergic/immunologic: H. plus nasal drip, watery itchy eyes, nasal drainage, immunosuppressed  The above were reviewed and noted negative except as noted in HPI and Problem List.      Objective   /64   Pulse 56   Ht 1.854 m (6' 1\")   Wt 99.8 kg (220 lb)   SpO2 96%   BMI 29.03 kg/m²     Physical Exam  Constitutional: Well developed, well nourished, alert and in no acute distress   Eyes: Normal external exam. Pupils equally round and reactive to light with normal accommodation and extraocular movements intact.  Neck: Supple, no lymphadenopathy or masses.   Cardiovascular: Regular rate and rhythm, normal S1 and " S2, no murmurs, gallops, or rubs. Radial pulses normal. No peripheral edema.  Pulmonary: No respiratory distress, lungs clear to auscultation bilaterally. No wheezes, rhonchi, rales.  Abdomen: soft,non tender, non distended, without masses or HSM  Skin: Warm, well perfused, normal skin turgor and color.   Neurologic: Cranial nerves II-XII grossly intact.   Psychiatric: Mood calm and affect normal  Musculoskeletal: Moving all extremities without restriction  Severe low back pain, left leg weakness, neurogenic claudication    Assessment/Plan   Problem List Items Addressed This Visit             ICD-10-CM    DM type 2 with diabetic dyslipidemia (CMS/[facility]) E11.69, E78.5    Relevant Orders    Follow Up In [facility] - PCP - Established    Osteoarthritis of spine with radiculopathy, lumbar region M47.26    Relevant Orders    Follow Up In [facility] - PCP - Established    MR lumbar spine wo IV contrast    CKD (chronic kidney disease) N18.9    Relevant Orders    Follow Up In [facility] - PCP - Established    Diabetes mellitus type 2 without retinopathy (CMS/[facility]) E11.9    Relevant Orders    Follow Up In [facility] - PCP - Established    Primary hypertension I10    Relevant Orders    Follow Up In [facility] - PCP - Established     Other Visit Diagnoses         Codes    Spinal stenosis of lumbosacral region     M48.07    Relevant Orders    Follow Up In [facility] - PCP - Established    Weakness of left leg     R29.898    Relevant Orders    Follow Up In [facility] - PCP - Established    MR lumbar spine wo IV contrast    Neurogenic claudication     R29.818    Relevant Orders    Follow Up In [facility] - PCP - Established    MR lumbar spine wo IV contrast    Need for influenza vaccination     Z23    Relevant Orders    Flu vaccine, quadrivalent, high-dose, preservative free, age 65y+ (FLUZONE) (Completed)               Continue current  medications and therapy for chronic medical conditions    Provider Attestation - Scribe documentation    All medical record entries made by the Scribe were at my direction and personally dictated by me. I have reviewed the chart and agree that the record accurately reflects my personal performance of the history, physical exam, discussion and plan.    Scribe Attestation  By signing my name below, I, Jose Manuel Estrada MD   , Scribe   attest that this documentation has been prepared under the direction and in the presence of Jose Manuel Estrada MD.      PSA q 6 months      may need LB surgery      consider restarting breztri next         Consider CT A & P next         MRI prostate WNL     Urine & bowel incont     Improved with vesicare & proscar          Patient failed PT,OT,ice,NSAIDs,prednisone, and pain management.patient continues with significant symptoms of weakness left leg and back pain. Patient is a candidate for MRI lumbar spine. See low back xray.      Referral to neurosurgery next    Could not tolerate metformin        Flu shot today

## 2023-09-28 ENCOUNTER — APPOINTMENT (OUTPATIENT)
Dept: PRIMARY CARE | Facility: CLINIC | Age: 78
End: 2023-09-28
Payer: MEDICARE

## 2023-10-02 DIAGNOSIS — I10 ESSENTIAL (PRIMARY) HYPERTENSION: ICD-10-CM

## 2023-10-03 RX ORDER — NIFEDIPINE 60 MG/1
60 TABLET, EXTENDED RELEASE ORAL DAILY
Qty: 90 TABLET | Refills: 1 | Status: SHIPPED | OUTPATIENT
Start: 2023-10-03 | End: 2024-04-05

## 2023-10-03 RX ORDER — SOTALOL HYDROCHLORIDE 120 MG/1
120 TABLET ORAL 2 TIMES DAILY
Qty: 180 TABLET | Refills: 1 | Status: SHIPPED | OUTPATIENT
Start: 2023-10-03 | End: 2024-04-05

## 2023-10-06 DIAGNOSIS — R39.14 BENIGN PROSTATIC HYPERPLASIA WITH INCOMPLETE BLADDER EMPTYING: ICD-10-CM

## 2023-10-06 DIAGNOSIS — N40.1 BENIGN PROSTATIC HYPERPLASIA WITH INCOMPLETE BLADDER EMPTYING: ICD-10-CM

## 2023-10-07 RX ORDER — SOLIFENACIN SUCCINATE 10 MG/1
10 TABLET, FILM COATED ORAL DAILY
Qty: 90 TABLET | Refills: 1 | Status: SHIPPED | OUTPATIENT
Start: 2023-10-07 | End: 2024-04-18

## 2023-10-10 DIAGNOSIS — E11.69 TYPE 2 DIABETES MELLITUS WITH OTHER SPECIFIED COMPLICATION (MULTI): ICD-10-CM

## 2023-10-11 RX ORDER — BLOOD-GLUCOSE METER
EACH MISCELLANEOUS
Qty: 100 STRIP | Refills: 5 | Status: SHIPPED | OUTPATIENT
Start: 2023-10-11 | End: 2023-12-05 | Stop reason: ALTCHOICE

## 2023-10-17 DIAGNOSIS — E78.5 DM TYPE 2 WITH DIABETIC DYSLIPIDEMIA (MULTI): ICD-10-CM

## 2023-10-17 DIAGNOSIS — E11.69 DM TYPE 2 WITH DIABETIC DYSLIPIDEMIA (MULTI): ICD-10-CM

## 2023-10-17 RX ORDER — LANCETS
2 EACH MISCELLANEOUS 2 TIMES DAILY
Qty: 200 EACH | Refills: 1 | Status: SHIPPED | OUTPATIENT
Start: 2023-10-17 | End: 2023-10-18

## 2023-10-17 RX ORDER — DEXTROSE 4 G
TABLET,CHEWABLE ORAL
Qty: 1 EACH | Refills: 0 | Status: SHIPPED | OUTPATIENT
Start: 2023-10-17

## 2023-10-17 NOTE — TELEPHONE ENCOUNTER
Dr. Estrada Pt    Refill for    Lancets   Glucose Sensor/[place]- pts sensor is broken      Pharmacy--[id number] - [place], [place] - [address] [place] [address] AT [facility]

## 2023-10-18 ENCOUNTER — ANCILLARY PROCEDURE (OUTPATIENT)
Dept: RADIOLOGY | Facility: CLINIC | Age: 78
End: 2023-10-18
Payer: MEDICARE

## 2023-10-18 DIAGNOSIS — R29.898 WEAKNESS OF LEFT LEG: ICD-10-CM

## 2023-10-18 DIAGNOSIS — M47.26 OSTEOARTHRITIS OF SPINE WITH RADICULOPATHY, LUMBAR REGION: ICD-10-CM

## 2023-10-18 DIAGNOSIS — M48.07 SPINAL STENOSIS OF LUMBOSACRAL REGION: ICD-10-CM

## 2023-10-18 PROCEDURE — 72148 MRI LUMBAR SPINE W/O DYE: CPT | Performed by: STUDENT IN AN ORGANIZED HEALTH CARE EDUCATION/TRAINING PROGRAM

## 2023-10-18 PROCEDURE — 72148 MRI LUMBAR SPINE W/O DYE: CPT | Mod: ME

## 2023-10-18 RX ORDER — LANCETS 33 GAUGE
EACH MISCELLANEOUS
Qty: 200 EACH | Refills: 1 | Status: SHIPPED | OUTPATIENT
Start: 2023-10-18

## 2023-10-22 DIAGNOSIS — N40.1 BENIGN PROSTATIC HYPERPLASIA WITH INCOMPLETE BLADDER EMPTYING: ICD-10-CM

## 2023-10-22 DIAGNOSIS — R39.14 BENIGN PROSTATIC HYPERPLASIA WITH INCOMPLETE BLADDER EMPTYING: ICD-10-CM

## 2023-10-24 ENCOUNTER — OFFICE VISIT (OUTPATIENT)
Dept: PRIMARY CARE | Facility: CLINIC | Age: 78
End: 2023-10-24
Payer: MEDICARE

## 2023-10-24 VITALS
HEIGHT: 73 IN | OXYGEN SATURATION: 96 % | SYSTOLIC BLOOD PRESSURE: 122 MMHG | DIASTOLIC BLOOD PRESSURE: 67 MMHG | BODY MASS INDEX: 29.31 KG/M2 | WEIGHT: 221.2 LBS | HEART RATE: 54 BPM

## 2023-10-24 DIAGNOSIS — E55.9 VITAMIN D DEFICIENCY: ICD-10-CM

## 2023-10-24 DIAGNOSIS — R29.898 WEAKNESS OF LEFT LEG: ICD-10-CM

## 2023-10-24 DIAGNOSIS — E78.5 DYSLIPIDEMIA: ICD-10-CM

## 2023-10-24 DIAGNOSIS — I10 PRIMARY HYPERTENSION: ICD-10-CM

## 2023-10-24 DIAGNOSIS — E78.5 DM TYPE 2 WITH DIABETIC DYSLIPIDEMIA (MULTI): ICD-10-CM

## 2023-10-24 DIAGNOSIS — M48.07 SPINAL STENOSIS OF LUMBOSACRAL REGION: ICD-10-CM

## 2023-10-24 DIAGNOSIS — Z12.5 SCREENING FOR PROSTATE CANCER: Primary | ICD-10-CM

## 2023-10-24 DIAGNOSIS — E11.9 DIABETES MELLITUS TYPE 2 WITHOUT RETINOPATHY (MULTI): ICD-10-CM

## 2023-10-24 DIAGNOSIS — N18.32 STAGE 3B CHRONIC KIDNEY DISEASE (MULTI): ICD-10-CM

## 2023-10-24 DIAGNOSIS — E11.69 DM TYPE 2 WITH DIABETIC DYSLIPIDEMIA (MULTI): ICD-10-CM

## 2023-10-24 DIAGNOSIS — M47.26 OSTEOARTHRITIS OF SPINE WITH RADICULOPATHY, LUMBAR REGION: ICD-10-CM

## 2023-10-24 DIAGNOSIS — R29.818 NEUROGENIC CLAUDICATION: ICD-10-CM

## 2023-10-24 PROCEDURE — 99214 OFFICE O/P EST MOD 30 MIN: CPT | Performed by: FAMILY MEDICINE

## 2023-10-24 NOTE — PROGRESS NOTES
Subjective   Patient ID: Francis Vang is a 77 y.o. male who presents for Results (MRI ).    Patient is here to go over his MRI of his lumbar spine, Patient states his back pain feels it is getting worst since onset. States he is currently not taking anything for his pain at this time.          Review of Systems  12 Systems have been reviewed as follows.  Constitutional: Fever, weight gain, weight loss, appetite change, night sweats, fatigue, chills.  Eyes : blurry, double vision, vision, loss, tearing, redness, pain, sensitivity to light, glaucoma.  Ears, nose, mouth, and throat: Hearing loss, ringing in the ears, ear pain, nasal congestion, nasal drainage, nosebleeds, mouth, throat, irritation tooth problem.  Cardiovascular :chest pain, pressure, heart racing, palpitations, sweating, leg swelling, high or low blood pressure  Pulmonary: Cough, yellow or green sputum, blood and sputum, shortness of breath, wheezing  Gastrointestinal: Nausea, vomiting, diarrhea, constipation, pain, blood in stool, or vomitus, heartburn, difficulty swallowing  Genitourinary: incontinence, abnormal bleeding, abnormal discharge, urinary frequency, urinary hesitancy, pain, impotence sexual problem, infection, urinary retention  Musculoskeletal: Pain, stiffness, joint, redness or warmth, arthritis, back pain, weakness, muscle wasting, sprain or fracture  Neuro: Weight weakness, dizziness, change in voice, change in taste change in vision, change in hearing, loss, or change of sensation, trouble walking, balance problems coordination problems, shaking, speech problem  Endocrine , cold or heat intolerance, blood sugar problem, weight gain or loss missed periods hot flashes, sweats, change in body hair, change in libido, increased thirst, increased urination  Heme/lymph: Swelling, bleeding, problem anemia, bruising, enlarged lymph nodes  Allergic/immunologic: H. plus nasal drip, watery itchy eyes, nasal drainage, immunosuppressed  The  "above were reviewed and noted negative except as noted in HPI and Problem List.    Objective   /67   Pulse 54   Ht 1.854 m (6' 1\")   Wt 100 kg (221 lb 3.2 oz)   SpO2 96%   BMI 29.18 kg/m²     Physical Exam  Constitutional: Well developed, well nourished, alert and in no acute distress   Eyes: Normal external exam. Pupils equally round and reactive to light with normal accommodation and extraocular movements intact.  Neck: Supple, no lymphadenopathy or masses.   Cardiovascular: Regular rate and rhythm, normal S1 and S2, no murmurs, gallops, or rubs. Radial pulses normal. No peripheral edema.  Pulmonary: No respiratory distress, lungs clear to auscultation bilaterally. No wheezes, rhonchi, rales.  Abdomen: soft,non tender, non distended, without masses or HSM  Skin: Warm, well perfused, normal skin turgor and color.   Neurologic: Cranial nerves II-XII grossly intact.   Psychiatric: Mood calm and affect normal  Musculoskeletal: Moving all extremities without restriction    Assessment/Plan   Problem List Items Addressed This Visit             ICD-10-CM    DM type 2 with diabetic dyslipidemia (CMS/[facility]) E11.69, E78.5    Relevant Orders    Follow Up In [facility] - PCP - Established    Osteoarthritis of spine with radiculopathy, lumbar region M47.26    Relevant Orders    Referral to Neurosurgery    Follow Up In [facility] - PCP - Established    CKD (chronic kidney disease) N18.9    Relevant Orders    Follow Up In [facility] - PCP - Established    Diabetes mellitus type 2 without retinopathy (CMS/[facility]) E11.9    Relevant Orders    Follow Up In [facility] - PCP - Established    Primary hypertension I10    Relevant Orders    Follow Up In [facility] - PCP - Established     Other Visit Diagnoses         Codes    Spinal stenosis of lumbosacral region     M48.07    Relevant Orders    Follow Up In [facility] - PCP - Established    Weakness of left leg     " R29.898    Relevant Orders    Follow Up In [facility] - PCP - Established    Neurogenic claudication     R29.818    Relevant Orders    Follow Up In [facility] - PCP - Established              Continue current medications and therapy for chronic medical conditions    Provider Attestation - Scribe documentation    All medical record entries made by the Scribe were at my direction and personally dictated by me. I have reviewed the chart and agree that the record accurately reflects my personal performance of the history, physical exam, discussion and plan.    Scribe Attestation  By signing my name below, I, Jose Manuel Estrada MD   , [place]   attest that this documentation has been prepared under the direction and in the presence of Jose Manuel Estrada MD.    PSA q 6 months      may need LB surgery     Patient does not feel breztri helped symptoms      Consider CT A & P next         MRI prostate WNL     Urine & bowel incont     Improved with vesicare & proscar      Could not tolerate metformin     Referral to neurosurgery Dr. Steve Felix     Consider adding back celebrex next after CMP

## 2023-10-25 ENCOUNTER — APPOINTMENT (OUTPATIENT)
Dept: RADIOLOGY | Facility: CLINIC | Age: 78
End: 2023-10-25
Payer: MEDICARE

## 2023-10-25 RX ORDER — FINASTERIDE 5 MG/1
5 TABLET, FILM COATED ORAL DAILY
Qty: 90 TABLET | Refills: 1 | Status: SHIPPED | OUTPATIENT
Start: 2023-10-25 | End: 2024-04-30

## 2023-11-09 ENCOUNTER — OFFICE VISIT (OUTPATIENT)
Dept: NEUROSURGERY | Facility: CLINIC | Age: 78
End: 2023-11-09
Payer: MEDICARE

## 2023-11-09 VITALS
DIASTOLIC BLOOD PRESSURE: 64 MMHG | HEART RATE: 48 BPM | RESPIRATION RATE: 14 BRPM | WEIGHT: 224.8 LBS | HEIGHT: 73 IN | BODY MASS INDEX: 29.79 KG/M2 | SYSTOLIC BLOOD PRESSURE: 120 MMHG

## 2023-11-09 DIAGNOSIS — M47.26 OSTEOARTHRITIS OF SPINE WITH RADICULOPATHY, LUMBAR REGION: ICD-10-CM

## 2023-11-09 DIAGNOSIS — M48.062 LUMBAR STENOSIS WITH NEUROGENIC CLAUDICATION: Primary | ICD-10-CM

## 2023-11-09 PROCEDURE — 1036F TOBACCO NON-USER: CPT | Performed by: STUDENT IN AN ORGANIZED HEALTH CARE EDUCATION/TRAINING PROGRAM

## 2023-11-09 PROCEDURE — 3078F DIAST BP <80 MM HG: CPT | Performed by: STUDENT IN AN ORGANIZED HEALTH CARE EDUCATION/TRAINING PROGRAM

## 2023-11-09 PROCEDURE — 3074F SYST BP LT 130 MM HG: CPT | Performed by: STUDENT IN AN ORGANIZED HEALTH CARE EDUCATION/TRAINING PROGRAM

## 2023-11-09 PROCEDURE — 99205 OFFICE O/P NEW HI 60 MIN: CPT | Performed by: STUDENT IN AN ORGANIZED HEALTH CARE EDUCATION/TRAINING PROGRAM

## 2023-11-09 PROCEDURE — 1160F RVW MEDS BY RX/DR IN RCRD: CPT | Performed by: STUDENT IN AN ORGANIZED HEALTH CARE EDUCATION/TRAINING PROGRAM

## 2023-11-09 PROCEDURE — 1159F MED LIST DOCD IN RCRD: CPT | Performed by: STUDENT IN AN ORGANIZED HEALTH CARE EDUCATION/TRAINING PROGRAM

## 2023-11-09 ASSESSMENT — PATIENT HEALTH QUESTIONNAIRE - PHQ9
1. LITTLE INTEREST OR PLEASURE IN DOING THINGS: NOT AT ALL
SUM OF ALL RESPONSES TO PHQ9 QUESTIONS 1 & 2: 0
2. FEELING DOWN, DEPRESSED OR HOPELESS: NOT AT ALL

## 2023-11-09 NOTE — PROGRESS NOTES
Francis Vang is a 77 y.o. year old male p/w LBP and radiculopathy of the LLE.    14/14 systems reviewed and negative other than what is listed in the history of present illness        Past Medical History:   Diagnosis Date    Deep vein thrombosis (DVT) of lower extremity (CMS/HCC) 04/22/2013    Encounter for general adult medical examination without abnormal findings 08/13/2020    Encounter for Medicare annual wellness exam       Past Surgical History:   Procedure Laterality Date    OTHER SURGICAL HISTORY  05/10/2019    Splenectomy    OTHER SURGICAL HISTORY  05/10/2019    Cholecystectomy           Current Outpatient Medications:     allopurinol (Zyloprim) 300 mg tablet, TAKE 1 TABLET BY MOUTH EVERY DAY, Disp: 90 tablet, Rfl: 1    atorvastatin (Lipitor) 10 mg tablet, TAKE 1 TABLET BY MOUTH EVERY DAY, Disp: 90 tablet, Rfl: 1    blood sugar diagnostic (ONETOUCH ULTRA BLUE TEST STRIP MISC), by in vitro route 2 times a day., Disp: , Rfl:     blood-glucose meter misc, Needed for Glucose reading, Disp: 1 each, Rfl: 0    celecoxib (CeleBREX) 200 mg capsule, Take by mouth., Disp: , Rfl:     cyanocobalamin (Vitamin B-12) 1,000 mcg tablet, Take 100 mcg by mouth once daily., Disp: , Rfl:     eszopiclone (Lunesta) 2 mg tablet, Take 1 tablet (2 mg) by mouth as needed at bedtime., Disp: , Rfl:     finasteride (Proscar) 5 mg tablet, TAKE 1 TABLET (5 MG) BY MOUTH ONCE DAILY. DO NOT CRUSH, CHEW, OR SPLIT., Disp: 90 tablet, Rfl: 1    fluticasone-umeclidin-vilanter (Trelegy Ellipta) 200-62.5-25 mcg blister with device, Inhale 1 puff once daily., Disp: 3 each, Rfl: 1    hydroCHLOROthiazide (HYDRODiuril) 25 mg tablet, TAKE ONE HALF TABLET BY MOUTH TWICE DAILY, Disp: 90 tablet, Rfl: 1    Jardiance 25 mg, TAKE 1 TABLET BY MOUTH EVERY DAY, Disp: 90 tablet, Rfl: 1    lisinopril 40 mg tablet, Take 1 tablet (40 mg) by mouth once daily., Disp: , Rfl:     lisinopril 5 mg tablet, TAKE 1 TABLET BY MOUTH DAILY, Disp: 90 tablet, Rfl: 1     montelukast (Singulair) 10 mg tablet, TAKE 1 TABLET BY MOUTH EVERYDAY AT BEDTIME, Disp: 90 tablet, Rfl: 1    NIFEdipine XL 60 mg 24 hr tablet, TAKE 1 TABLET BY MOUTH EVERY DAY, Disp: 90 tablet, Rfl: 1    nitroglycerin (Nitrostat) 0.4 mg SL tablet, Place under the tongue., Disp: , Rfl:     OneTouch Delica Plus Lancet 33 gauge misc, 2 EACH 2 TIMES A DAY., Disp: 200 each, Rfl: 1    OneTouch Ultra Test strip, TEST BLOOD GLUCOSE TWO TIMES DAILY .DX. E11.9, Disp: , Rfl:     OneTouch Verio test strips strip, TEST BLOOD GLUCOSE TWO TIMES DAILY .DX. E11.9, Disp: 100 strip, Rfl: 5    pioglitazone (Actos) 45 mg tablet, TAKE 1 TABLET BY MOUTH EVERY DAY, Disp: 90 tablet, Rfl: 1    solifenacin (VESIcare) 10 mg tablet, TAKE 1 TABLET (10 MG) BY MOUTH ONCE DAILY. SWALLOW TABLET WHOLE DO NOT CRUSH, CHEW, OR SPLIT., Disp: 90 tablet, Rfl: 1    sotalol  mg tablet, TAKE 1 TABLET BY MOUTH TWICE A DAY, Disp: 180 tablet, Rfl: 1    tamsulosin (Flomax) 0.4 mg 24 hr capsule, Take 1 capsule (0.4 mg) by mouth once daily., Disp: , Rfl:     theophylline ER (Darryl-Dur) 300 mg 12 hr tablet, Take 1 tablet (300 mg) by mouth once daily., Disp: , Rfl:     Xarelto 20 mg tablet, TAKE 1 TABLET BY MOUTH EVERY DAY, Disp: 90 tablet, Rfl: 1      Vitals:    11/09/23 0818   BP: 120/64   Pulse: (!) 48   Resp: 14     Objective   General: Well developed, awake/alert/oriented x3, no distress, alert and cooperative  Skin: Warm and dry, no lesions, no rashes  ENMT: Mucous membranes moist, no apparent injury, no lesions seen  Head/Neck: Neck Supple, no apparent injury  Respiratory/Thorax: Normal breath sounds with good chest expansion, thorax symmetric  Cardiovascular: No pitting edema, no JVD    Motor Strength: 5/5 Throughout all extremities    Muscle Bulk: Normal and symmetric in all extremities    Posture:   -- Cervical: Normal  -- Thoracic: Normal  -- Lumbar : Normal  Paraspinal muscle spasm/tenderness absent.     Sensation: intact to light touch     Relevant  Results    MRI L-spine with L2-3 central stenosis and prior surgical decompression on the right side fro L1-3    Upright xrays with mild L2-3 retrolisthesis     Problem List Items Addressed This Visit       Osteoarthritis of spine with radiculopathy, lumbar region    Relevant Orders    Referral to Physical Therapy    Lumbar stenosis with neurogenic claudication - Primary    Relevant Orders    CT lumbar spine wo IV contrast    Referral to Physical Therapy          Assessment/Plan       Mr. Vang is a very nice 78 yo man presenting with LBP and radiculopathy.  He states that when he stands for any duration of time, including when taking a shower, he has numbness through his entire left leg.  He also states that he has severe back pain that is only alleviated with sitting down.  He states that he is unable to participate in the activities of his daily life including mowing the grass or taking care of his house without severe pain.  He has had prior surgical decompression at the L1-3 with his last surgery being from April 2022.  He states that this pain is new from after surgery.    We reviewed his MRI L-spine together showing the prior partial decompression occurring only on the right from L1-3.  There is still bony lamina left on the left side.  I also showed him there is a synovial cyst on the left at L2-3 suggesting the presence of instability.     I offered the patient a L2-4 decompression and fusion given the presence of the instability and the residual stenosis from L2-4. We discussed the possibility of doing a decompression alone, but that would mean the possible risk of future surgical intervention. Given this will be the patient's fourth surgery in this area, we decided together to pursue a fusion at this time. We discussed the risks include anesthesia risk, CSF leak, wound infection, hardware failure, and possible nerve damage.    I would also like to obtain a CT L-spine to see the extent of prior  intervention to see what the bony anatomy currently is.     In the meantime, I will refer the patient to physical therapy for core strengthening. However, in the setting of neurogenic claudication, I do not think PT alone will suffice to alleviate the patient's symptoms.     The patient will need to stop his xarelto 2-3 days prior to surgery and cannot resume for 5-7 days. The patient will obtain medical clearance from [facility] and also discuss with his PCP Dr. Estrada whether or not it is safe for him to stop his xarleto. Given his DVTs were more than 5 years ago, this likely will not preclude him from surgery.     We will plan on surgery at  in December.      Jessica Laureano MD    of Neurosurgery   [facility] [place]   [facility]   Office: [phone]  Fax: [phone]

## 2023-11-10 DIAGNOSIS — J30.9 ALLERGIC RHINITIS, UNSPECIFIED: ICD-10-CM

## 2023-11-10 DIAGNOSIS — M10.9 GOUT, UNSPECIFIED: ICD-10-CM

## 2023-11-11 RX ORDER — ALLOPURINOL 300 MG/1
TABLET ORAL
Qty: 90 TABLET | Refills: 1 | Status: SHIPPED | OUTPATIENT
Start: 2023-11-11 | End: 2024-05-06

## 2023-11-11 RX ORDER — MONTELUKAST SODIUM 10 MG/1
TABLET ORAL
Qty: 90 TABLET | Refills: 1 | Status: SHIPPED | OUTPATIENT
Start: 2023-11-11 | End: 2024-05-06

## 2023-11-13 ENCOUNTER — ANCILLARY PROCEDURE (OUTPATIENT)
Dept: RADIOLOGY | Facility: CLINIC | Age: 78
End: 2023-11-13
Payer: MEDICARE

## 2023-11-13 DIAGNOSIS — E08.22 DIABETES MELLITUS DUE TO UNDERLYING CONDITION WITH DIABETIC CHRONIC KIDNEY DISEASE, UNSPECIFIED CKD STAGE, UNSPECIFIED WHETHER LONG TERM INSULIN USE (MULTI): ICD-10-CM

## 2023-11-13 DIAGNOSIS — M54.10 RADICULOPATHY, UNSPECIFIED SPINAL REGION: ICD-10-CM

## 2023-11-13 DIAGNOSIS — Z79.01 LONG TERM (CURRENT) USE OF ANTICOAGULANTS: ICD-10-CM

## 2023-11-13 DIAGNOSIS — M48.062 LUMBAR STENOSIS WITH NEUROGENIC CLAUDICATION: ICD-10-CM

## 2023-11-13 DIAGNOSIS — E08.29 DIABETES MELLITUS DUE TO UNDERLYING CONDITION WITH OTHER DIABETIC KIDNEY COMPLICATION (MULTI): ICD-10-CM

## 2023-11-13 PROCEDURE — 72131 CT LUMBAR SPINE W/O DYE: CPT

## 2023-11-13 PROCEDURE — 72131 CT LUMBAR SPINE W/O DYE: CPT | Performed by: RADIOLOGY

## 2023-11-14 RX ORDER — RIVAROXABAN 20 MG/1
TABLET, FILM COATED ORAL
Qty: 90 TABLET | Refills: 1 | Status: SHIPPED | OUTPATIENT
Start: 2023-11-14 | End: 2024-04-30

## 2023-11-15 ENCOUNTER — APPOINTMENT (OUTPATIENT)
Dept: RADIOLOGY | Facility: CLINIC | Age: 78
End: 2023-11-15
Payer: MEDICARE

## 2023-11-20 ENCOUNTER — APPOINTMENT (OUTPATIENT)
Dept: RADIOLOGY | Facility: CLINIC | Age: 78
End: 2023-11-20
Payer: MEDICARE

## 2023-11-20 ENCOUNTER — ANCILLARY PROCEDURE (OUTPATIENT)
Dept: RADIOLOGY | Facility: CLINIC | Age: 78
End: 2023-11-20
Payer: MEDICARE

## 2023-11-20 DIAGNOSIS — M54.10 RADICULOPATHY, UNSPECIFIED SPINAL REGION: ICD-10-CM

## 2023-11-20 PROCEDURE — 71046 X-RAY EXAM CHEST 2 VIEWS: CPT | Performed by: RADIOLOGY

## 2023-11-20 PROCEDURE — 71046 X-RAY EXAM CHEST 2 VIEWS: CPT

## 2023-12-04 ENCOUNTER — LAB (OUTPATIENT)
Dept: LAB | Facility: LAB | Age: 78
End: 2023-12-04
Payer: MEDICARE

## 2023-12-04 DIAGNOSIS — E08.22 DIABETES MELLITUS DUE TO UNDERLYING CONDITION WITH DIABETIC CHRONIC KIDNEY DISEASE, UNSPECIFIED CKD STAGE, UNSPECIFIED WHETHER LONG TERM INSULIN USE (MULTI): ICD-10-CM

## 2023-12-04 DIAGNOSIS — I10 PRIMARY HYPERTENSION: ICD-10-CM

## 2023-12-04 DIAGNOSIS — E78.5 DM TYPE 2 WITH DIABETIC DYSLIPIDEMIA (MULTI): ICD-10-CM

## 2023-12-04 DIAGNOSIS — Z12.5 SCREENING FOR PROSTATE CANCER: ICD-10-CM

## 2023-12-04 DIAGNOSIS — E11.69 DM TYPE 2 WITH DIABETIC DYSLIPIDEMIA (MULTI): ICD-10-CM

## 2023-12-04 DIAGNOSIS — E11.9 DIABETES MELLITUS TYPE 2 WITHOUT RETINOPATHY (MULTI): ICD-10-CM

## 2023-12-04 DIAGNOSIS — R29.898 WEAKNESS OF LEFT LEG: ICD-10-CM

## 2023-12-04 DIAGNOSIS — E78.5 DYSLIPIDEMIA: ICD-10-CM

## 2023-12-04 DIAGNOSIS — E55.9 VITAMIN D DEFICIENCY: ICD-10-CM

## 2023-12-04 DIAGNOSIS — M47.26 OSTEOARTHRITIS OF SPINE WITH RADICULOPATHY, LUMBAR REGION: ICD-10-CM

## 2023-12-04 DIAGNOSIS — R29.818 NEUROGENIC CLAUDICATION: ICD-10-CM

## 2023-12-04 DIAGNOSIS — M54.10 RADICULOPATHY, UNSPECIFIED SPINAL REGION: ICD-10-CM

## 2023-12-04 DIAGNOSIS — M48.07 SPINAL STENOSIS OF LUMBOSACRAL REGION: ICD-10-CM

## 2023-12-04 DIAGNOSIS — E08.29 DIABETES MELLITUS DUE TO UNDERLYING CONDITION WITH OTHER DIABETIC KIDNEY COMPLICATION (MULTI): ICD-10-CM

## 2023-12-04 DIAGNOSIS — N18.32 STAGE 3B CHRONIC KIDNEY DISEASE (MULTI): ICD-10-CM

## 2023-12-04 LAB
25(OH)D3 SERPL-MCNC: 32 NG/ML (ref 30–100)
ALBUMIN SERPL BCP-MCNC: 3.7 G/DL (ref 3.4–5)
ALP SERPL-CCNC: 75 U/L (ref 33–136)
ALT SERPL W P-5'-P-CCNC: 9 U/L (ref 10–52)
ANION GAP SERPL CALC-SCNC: 8 MMOL/L (ref 10–20)
APTT PPP: 45 SECONDS (ref 27–38)
AST SERPL W P-5'-P-CCNC: 12 U/L (ref 9–39)
BASOPHILS # BLD AUTO: 0.08 X10*3/UL (ref 0–0.1)
BASOPHILS NFR BLD AUTO: 1.3 %
BILIRUB SERPL-MCNC: 0.6 MG/DL (ref 0–1.2)
BUN SERPL-MCNC: 32 MG/DL (ref 6–23)
CALCIUM SERPL-MCNC: 9.2 MG/DL (ref 8.6–10.3)
CHLORIDE SERPL-SCNC: 102 MMOL/L (ref 98–107)
CHOLEST SERPL-MCNC: 109 MG/DL (ref 0–199)
CHOLESTEROL/HDL RATIO: 3.2
CO2 SERPL-SCNC: 32 MMOL/L (ref 21–32)
CREAT SERPL-MCNC: 1.72 MG/DL (ref 0.5–1.3)
EOSINOPHIL # BLD AUTO: 0.29 X10*3/UL (ref 0–0.4)
EOSINOPHIL NFR BLD AUTO: 4.8 %
ERYTHROCYTE [DISTWIDTH] IN BLOOD BY AUTOMATED COUNT: 16.2 % (ref 11.5–14.5)
EST. AVERAGE GLUCOSE BLD GHB EST-MCNC: 157 MG/DL
GFR SERPL CREATININE-BSD FRML MDRD: 40 ML/MIN/1.73M*2
GLUCOSE SERPL-MCNC: 133 MG/DL (ref 74–99)
HBA1C MFR BLD: 7.1 %
HCT VFR BLD AUTO: 37.9 % (ref 41–52)
HDLC SERPL-MCNC: 34.4 MG/DL
HGB BLD-MCNC: 12.4 G/DL (ref 13.5–17.5)
IMM GRANULOCYTES # BLD AUTO: 0.02 X10*3/UL (ref 0–0.5)
IMM GRANULOCYTES NFR BLD AUTO: 0.3 % (ref 0–0.9)
INR PPP: 1.9 (ref 0.9–1.1)
LDLC SERPL CALC-MCNC: 61 MG/DL
LYMPHOCYTES # BLD AUTO: 1.53 X10*3/UL (ref 0.8–3)
LYMPHOCYTES NFR BLD AUTO: 25.3 %
MCH RBC QN AUTO: 32.2 PG (ref 26–34)
MCHC RBC AUTO-ENTMCNC: 32.7 G/DL (ref 32–36)
MCV RBC AUTO: 98 FL (ref 80–100)
MONOCYTES # BLD AUTO: 1 X10*3/UL (ref 0.05–0.8)
MONOCYTES NFR BLD AUTO: 16.5 %
NEUTROPHILS # BLD AUTO: 3.13 X10*3/UL (ref 1.6–5.5)
NEUTROPHILS NFR BLD AUTO: 51.8 %
NON HDL CHOLESTEROL: 75 MG/DL (ref 0–149)
NRBC BLD-RTO: 0 /100 WBCS (ref 0–0)
PLATELET # BLD AUTO: 217 X10*3/UL (ref 150–450)
POTASSIUM SERPL-SCNC: 4.1 MMOL/L (ref 3.5–5.3)
PROT SERPL-MCNC: 6.3 G/DL (ref 6.4–8.2)
PROTHROMBIN TIME: 21.9 SECONDS (ref 9.8–12.8)
PSA SERPL-MCNC: 0.96 NG/ML
RBC # BLD AUTO: 3.85 X10*6/UL (ref 4.5–5.9)
SODIUM SERPL-SCNC: 138 MMOL/L (ref 136–145)
TRIGL SERPL-MCNC: 66 MG/DL (ref 0–149)
VLDL: 13 MG/DL (ref 0–40)
WBC # BLD AUTO: 6.1 X10*3/UL (ref 4.4–11.3)

## 2023-12-04 PROCEDURE — 82306 VITAMIN D 25 HYDROXY: CPT

## 2023-12-04 PROCEDURE — 86901 BLOOD TYPING SEROLOGIC RH(D): CPT | Mod: OUT | Performed by: STUDENT IN AN ORGANIZED HEALTH CARE EDUCATION/TRAINING PROGRAM

## 2023-12-04 PROCEDURE — 86850 RBC ANTIBODY SCREEN: CPT

## 2023-12-04 PROCEDURE — 83036 HEMOGLOBIN GLYCOSYLATED A1C: CPT

## 2023-12-04 PROCEDURE — 80053 COMPREHEN METABOLIC PANEL: CPT

## 2023-12-04 PROCEDURE — 36415 COLL VENOUS BLD VENIPUNCTURE: CPT

## 2023-12-04 PROCEDURE — 86901 BLOOD TYPING SEROLOGIC RH(D): CPT

## 2023-12-04 PROCEDURE — 85025 COMPLETE CBC W/AUTO DIFF WBC: CPT

## 2023-12-04 PROCEDURE — 86900 BLOOD TYPING SEROLOGIC ABO: CPT

## 2023-12-04 PROCEDURE — 85730 THROMBOPLASTIN TIME PARTIAL: CPT

## 2023-12-04 PROCEDURE — 80061 LIPID PANEL: CPT

## 2023-12-04 PROCEDURE — G0103 PSA SCREENING: HCPCS

## 2023-12-04 PROCEDURE — 85610 PROTHROMBIN TIME: CPT

## 2023-12-05 ENCOUNTER — LAB REQUISITION (OUTPATIENT)
Dept: LAB | Facility: HOSPITAL | Age: 78
End: 2023-12-05
Payer: MEDICARE

## 2023-12-05 ENCOUNTER — OFFICE VISIT (OUTPATIENT)
Dept: PRIMARY CARE | Facility: CLINIC | Age: 78
End: 2023-12-05
Payer: MEDICARE

## 2023-12-05 VITALS
OXYGEN SATURATION: 98 % | HEART RATE: 42 BPM | SYSTOLIC BLOOD PRESSURE: 126 MMHG | HEIGHT: 72 IN | DIASTOLIC BLOOD PRESSURE: 62 MMHG | BODY MASS INDEX: 30.07 KG/M2 | WEIGHT: 222 LBS

## 2023-12-05 DIAGNOSIS — E11.9 DIABETES MELLITUS TYPE 2 WITHOUT RETINOPATHY (MULTI): ICD-10-CM

## 2023-12-05 DIAGNOSIS — M47.26 OSTEOARTHRITIS OF SPINE WITH RADICULOPATHY, LUMBAR REGION: ICD-10-CM

## 2023-12-05 DIAGNOSIS — N18.32 STAGE 3B CHRONIC KIDNEY DISEASE (MULTI): ICD-10-CM

## 2023-12-05 DIAGNOSIS — E11.69 DM TYPE 2 WITH DIABETIC DYSLIPIDEMIA (MULTI): ICD-10-CM

## 2023-12-05 DIAGNOSIS — M54.10 RADICULOPATHY, SITE UNSPECIFIED: ICD-10-CM

## 2023-12-05 DIAGNOSIS — I28.8 OTHER DISEASES OF PULMONARY VESSELS (MULTI): Primary | ICD-10-CM

## 2023-12-05 DIAGNOSIS — R29.898 WEAKNESS OF LEFT LEG: ICD-10-CM

## 2023-12-05 DIAGNOSIS — R29.818 NEUROGENIC CLAUDICATION: ICD-10-CM

## 2023-12-05 DIAGNOSIS — G83.10 PARESIS OF SINGLE LOWER EXTREMITY (MULTI): ICD-10-CM

## 2023-12-05 DIAGNOSIS — I10 PRIMARY HYPERTENSION: ICD-10-CM

## 2023-12-05 DIAGNOSIS — M48.07 SPINAL STENOSIS OF LUMBOSACRAL REGION: ICD-10-CM

## 2023-12-05 DIAGNOSIS — E78.5 DM TYPE 2 WITH DIABETIC DYSLIPIDEMIA (MULTI): ICD-10-CM

## 2023-12-05 LAB
ABO GROUP (TYPE) IN BLOOD: NORMAL
ANTIBODY SCREEN: NORMAL
POC HEMOGLOBIN A1C: 7.1 % (ref 4.2–6.5)
RH FACTOR (ANTIGEN D): NORMAL

## 2023-12-05 PROCEDURE — 99214 OFFICE O/P EST MOD 30 MIN: CPT | Performed by: FAMILY MEDICINE

## 2023-12-05 PROCEDURE — 83036 HEMOGLOBIN GLYCOSYLATED A1C: CPT | Performed by: FAMILY MEDICINE

## 2023-12-05 NOTE — PROGRESS NOTES
Subjective   Patient ID: Francis Vang is a 78 y.o. male who presents for Results and Hypertension.    HPI Pt had blood work done on 12/04/2023 and would like to go over the results today.    Pt is currently taking Lisinopril and does not check it at home.    Review of Systems  12 Systems have been reviewed as follows.  Constitutional: Fever, weight gain, weight loss, appetite change, night sweats, fatigue, chills.  Eyes : blurry, double vision, vision, loss, tearing, redness, pain, sensitivity to light, glaucoma.  Ears, nose, mouth, and throat: Hearing loss, ringing in the ears, ear pain, nasal congestion, nasal drainage, nosebleeds, mouth, throat, irritation tooth problem.  Cardiovascular :chest pain, pressure, heart racing, palpitations, sweating, leg swelling, high or low blood pressure  Pulmonary: Cough, yellow or green sputum, blood and sputum, shortness of breath, wheezing  Gastrointestinal: Nausea, vomiting, diarrhea, constipation, pain, blood in stool, or vomitus, heartburn, difficulty swallowing  Genitourinary: incontinence, abnormal bleeding, abnormal discharge, urinary frequency, urinary hesitancy, pain, impotence sexual problem, infection, urinary retention  Musculoskeletal: Pain, stiffness, joint, redness or warmth, arthritis, back pain, weakness, muscle wasting, sprain or fracture  Neuro: Weight weakness, dizziness, change in voice, change in taste change in vision, change in hearing, loss, or change of sensation, trouble walking, balance problems coordination problems, shaking, speech problem  Endocrine , cold or heat intolerance, blood sugar problem, weight gain or loss missed periods hot flashes, sweats, change in body hair, change in libido, increased thirst, increased urination  Heme/lymph: Swelling, bleeding, problem anemia, bruising, enlarged lymph nodes  Allergic/immunologic: H. plus nasal drip, watery itchy eyes, nasal drainage, immunosuppressed  The above were reviewed and noted negative  except as noted in HPI and Problem List.      Objective   /62 (BP Location: Left arm, Patient Position: Sitting, BP Cuff Size: Adult)   Pulse (!) 42   Ht 1.829 m (6')   Wt 101 kg (222 lb)   SpO2 98%   BMI 30.11 kg/m²     Physical Exam  Constitutional: Well developed, well nourished, alert and in no acute distress   Eyes: Normal external exam. Pupils equally round and reactive to light with normal accommodation and extraocular movements intact.  Neck: Supple, no lymphadenopathy or masses.   Cardiovascular: Regular rate and rhythm, normal S1 and S2, no murmurs, gallops, or rubs. Radial pulses normal. No peripheral edema.  Pulmonary: No respiratory distress, lungs clear to auscultation bilaterally. No wheezes, rhonchi, rales.  Abdomen: soft,non tender, non distended, without masses or HSM  Skin: Warm, well perfused, normal skin turgor and color.   Neurologic: Cranial nerves II-XII grossly intact.   Psychiatric: Mood calm and affect normal  Musculoskeletal: Moving all extremities without restriction    Assessment/Plan   Problem List Items Addressed This Visit             ICD-10-CM    DM type 2 with diabetic dyslipidemia (CMS/[facility]) E11.69, E78.5    Relevant Orders    POCT glycosylated hemoglobin (Hb A1C) manually resulted (Completed)    Follow Up In [facility] - PCP - Established    Osteoarthritis of spine with radiculopathy, lumbar region M47.26    Relevant Orders    Follow Up In [facility] - PCP - Established    CKD (chronic kidney disease) N18.9    Relevant Orders    Follow Up In [facility] - PCP - Established    Diabetes mellitus type 2 without retinopathy (CMS/HCC) E11.9    Relevant Orders    Follow Up In [facility] - PCP - Established    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    Primary hypertension I10    Relevant Orders    Follow Up In [facility] - PCP - Established    Weakness of left leg R29.898    Relevant Orders    Follow Up In  [facility] - PCP - Established    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    Patient was advised importance of proper diet/nutrition in addition adequate hydration. Patient was encouraged moderate exercise program to include 30 minutes daily for 5 days of the week or 150 minutes weekly. Patient will follow-up with us as scheduled.    Surgery on back scheduled 12/27/23 with ..   Right leg has pain with weakness.,    Patient does not feel breztri helped symptoms     Consider CT A & P next     MRI prostate WNL     Urine & bowel incont     Improved with vesicare & proscar    Could not tolerate metformin      PSA q 6 months      Alvaro day take xarelto in the morning and stop until the da;y after surgery if no bleeding is occurring.    To confirm medical clearance, return prior to [place]..12/20    Scribe Attestation  By signing my name below, I, Jose Manuel Estrada MD   , Scribe attest that this documentation has been prepared under the direction and in the presence of Jose Manuel Estrada MD.      Provider Attestation - Scribe documentation  All medical record entries made by the Scribe were at my direction and personally dictated by me. I have reviewed the chart and agree that the record accurately reflects my personal performance of the history, physical exam, discussion and plan.    FU 12/20

## 2023-12-07 ENCOUNTER — PRE-ADMISSION TESTING (OUTPATIENT)
Dept: PREADMISSION TESTING | Facility: HOSPITAL | Age: 78
End: 2023-12-07
Payer: MEDICARE

## 2023-12-07 NOTE — PROGRESS NOTES
Patient Name: Francis Vang  MRN: [id number]  Today's Date: 12/11/2023  Time Calculation  Start Time: 1201  Stop Time: 1245  Time Calculation (min): 44 min    Referring Diagnosis:  1. Weakness of left leg  Follow Up In Physical Therapy      2. Osteoarthritis of spine with radiculopathy, lumbar region  Referral to Physical Therapy    Follow Up In Physical Therapy      3. Lumbar stenosis with neurogenic claudication  Referral to Physical Therapy    Follow Up In Physical Therapy      4. Low back pain associated with a spinal disorder other than radiculopathy or spinal stenosis  Follow Up In Physical Therapy        M47.26 (ICD-10-CM) - Osteoarthritis of spine with radiculopathy, lumbar region   M48.062 (ICD-10-CM) - Lumbar stenosis with neurogenic claudication     Insurance:  1/10  [employer]  20% coins, $150 ded (met), no copay, bmn jude yr, no PA per [facility] at [phone] (ref# [id number])  Verified no referral or PA required through [facility] (see printouts)  POC 12.11.23 - 3.10.23    Precautions:  Precautions  STEADI Fall Risk Score (The score of 4 or more indicates an increased risk of falling): 1  Precautions Comment: low fall risk      Assessment:  Mr. Vang's clinical presentation includes characteristics as noted during today's evaluation consistent with needing skilled physical therapy for OA lumbar spine and Lumbar stenosis with neurogenic claudication.  Patient presents with deficits including a decrease in postural awareness, strength, ROM and an increase in STR which is increasing patient's pain and limiting their ability to return to PLOF. Clinical findings indicate that patient presents with a decreased knowledge of core stability and transfer techniques that will be necessary post op.   These findings indicate that this patient is of low complexity, and skilled PT services are warranted in order to realize measurable and meaningful change in the above outcome measures and achieve improvements in  the patient's functional status and individual goals.     Plan:  Patient was seen for his initial evaluation and instruction in post op transfer techniques as well as core strengthening ex to begin before his surgery. Plan is to see patient post op surgery and reassess at that time and cont PT.   Skilled PT  Patient to return in approx 1 month after surgery   Onset date/referral 11.9.23  Rehab Potential good  Patient agreeable to POC yes  Treatment may include: Therapeutic Exercise (PROM, AA/AROM, Flexibility, Strengthening, Stabilization, HEP instruction). Therapeutic Activities (Transfers, Body Mechanics, ADLs, Work Related Activities, Closed Chain, Agility and Power). Manual Techniques (Soft tissue Mobilization, Joint Mobilization/Distraction, Joint Manipulation, Muscle Energy Techniques, Lymphatic Drainage, Dry Needling). Neuromuscular Reeducation (Postural Training, Balance/Proprioception, Relaxation Techniques). Biofeedback. Aquatic Exercise. Modalities: Ultrasound, Moist Heat, Cryotherapy, Vasopneumatic with or without Cryotherapy. Electrical Stimulation (TENS/IFC/ Premodulated for pain relief, NMES for Muscle reeducation). Gait Training. Orthotic Fit and Training. Strapping, Kinesiotaping.    Subjective:  Referred by: Dr. Laureano  M47.26 (ICD-10-CM) - Osteoarthritis of spine with radiculopathy, lumbar region   M48.062 (ICD-10-CM) - Lumbar stenosis with neurogenic claudication   Referral 11.9.23  Initial Injury 2012  Onset date 12.11.23  Pain in low back; states that pain can switch sides as to which hurts the most. Chronic history of LBP.  Has had 3 other surgeries.  First 2012.   Reports recently that B of his knees have been limiting his activity more recently than his back.    Occasional radicular symptoms pain in L leg and now is experiencing pain in R anterior quad with intermittent buckling.   States that sitting in his recliner is where has the most relief from pain.    Last surgery 4.2022.    Scheduled  "for L2-4 Decompression and Fusion on 12.27.23  Pain:  Pain Assessment: 0-10  Pain Score: 5 - Moderate pain  Pain Descriptors: Aching   Location:   Description: Tooth ache   Aggravating Factors: standing and walking   Relieving Factors:  sitting  - sleeps on his side   Diagnostics Per MD MRI L-spine together showing the prior partial decompression occurring only on the right from L1-3.  There is still bony lamina left on the left side.  I also showed him there is a synovial cyst on the left at L2-3 suggesting the presence of instability.   Meds none for back   Previous Treatments PT in the past after prior surgeries.    PMHx-  Reviewed with patient and chart, SOB, B knee pain   Home environment lives with spouse 2 story - stairs are difficult due to knees and breathing  Occupation retired.    Hobbies yardwork,      Functional limitations   Walking patient states that his distance is limited by his SOB - can walk around a store with a cart.   Standing 20 min  Sitting no limitations    Handed R    Patients goal: To learn some ex and have less pain    Objective:  (p! indicates pain)  Posture Fwd head, shoulder with mild kyphosis.  Normal lumbar curvature however stands with slight hip flexion     Gait/Stairs Ambulates with slight hip flexion no deviations  Stairs NT however patient reports that they are difficult due to his knee pain and his SOB.  Bed Mobility indep  Transfers indep with use of UE  Sit to stand 18\" high 5 x times in 19 sec with use of UE.  Attempted 1 times without UE support and experienced a LOB    AROM  TRUNK %  Flexion 50p! R side  Extension 100  Sidebend R 75p!  Sidebend L 75    HIP PROM approx  Flexion R/L WFL   Extension R/L unable to test due to pain   Abduction R/L in sitting WFL  Adduction R/L Tested to neutral  IR R/L 10-15, 10-15  ER R/L WFL    Myotomes/Strength: #/5  L2: Hip flexion R/L:  4+, 4+  L3: Knee extension R/L:  4+, 4 may be limited by knee pain   L4: Ankle dorsiflexion, " inversion R/L:  5, 5  L5: Great toe extension R/L: NT  S1: Ankle plantarflexion and eversion R/L: NT  S2: Knee flexion R/L:  4+ 4 p!  Hip ABD R/L:  sitting 5, 5  Hip Ext R/L:  NT  Hip ADD R/L: sitting 5, 5    Abdominals - TA Poor    Neuro(N&T)  reported dysthesia in L lateral lower leg     Special Tests  SLR R/L -, -  Figure 4 R/L -, -  Overpressure  NT    Joint mobility/Flexibility  Hamstring 90/90 position R/L: approx 15-20 on each   Eren test R/L:  +, +    SLS NT    Outcome Measure:  Other Measures  Oswestry Disablity Index (ASH): 20%     Treatment:  PT low complexity eval:   Ther ex/patient ed/HEP instruction 37587:  The patient was educated on: the importance of positioning, proper posture, and body mechanics, joint mechanics and pathology, general tissue healing time, the appropriate use of heat and cold to control pain and inflammation, the importance of general therapeutic exercise, especially to stay within pain-free ROM, specific anatomy, function, & regional interdependence of involved areas, & likely cause of impairments & POC. Pt's questions were answered to their satisfaction, & pt. verbalized understanding & agreement with POC.  HEP  Access Code: [email or web address]  URL: [email or web address]  Date: 12/11/2023  Prepared by: Maryan Jarrett    Exercises  - Supine Transversus Abdominis Bracing - Hands on Stomach  - 2 x daily - 7 x weekly - 1 sets - 5 reps - 3 sec hold  - Seated Transversus Abdominis Bracing  - 4 x daily - 7 x weekly - 1 sets - 5 reps - 3 sec hold    Goals: Based on current information and PLOF  Goals: To be achieved in 10 visits - 1 pre op and 9 post op    Patient will verbalize a decrease in pain in low back to <2/10 in order to perform his daily ADL and IADL with less pain.     Patient will improve flexibility, joint mobility, and AROM in in B hips and trunk to WFL without pain    Patient will increase strength of  LE to at least 4+ to 5/5 and TA to at least Good to allow the  patient to perform his yardwork as needed    Patient to improve balance to be able to SLS at least 15 sec to improve safety with gait and decrease risk for falls    Patient will improve ASH outcome measure score to  <20% to demonstrate an overall improvement in function    Patient will improve sit to stand functional test to complete 5 reps at 18 in < 12 sec without UE support    Patient will improve ambulation to tolerate at least 45 min without pain increasing    Patient will correctly perform HEP without cues to maintain progress and overall functional status and patient will be independent with strategies designed to manage symptoms     Patient's LTG -  Patients goal: To learn some ex and have less pain    The patient and/or caregiver was involved in the creation of PT goals and patient agrees with prognosis, goals, and need to actively participate in the POC.

## 2023-12-11 ENCOUNTER — EVALUATION (OUTPATIENT)
Dept: PHYSICAL THERAPY | Facility: CLINIC | Age: 78
End: 2023-12-11
Payer: MEDICARE

## 2023-12-11 DIAGNOSIS — M54.50 LOW BACK PAIN ASSOCIATED WITH A SPINAL DISORDER OTHER THAN RADICULOPATHY OR SPINAL STENOSIS: ICD-10-CM

## 2023-12-11 DIAGNOSIS — I10 ESSENTIAL (PRIMARY) HYPERTENSION: ICD-10-CM

## 2023-12-11 DIAGNOSIS — M48.062 LUMBAR STENOSIS WITH NEUROGENIC CLAUDICATION: ICD-10-CM

## 2023-12-11 DIAGNOSIS — R29.898 WEAKNESS OF LEFT LEG: Primary | ICD-10-CM

## 2023-12-11 DIAGNOSIS — M47.26 OSTEOARTHRITIS OF SPINE WITH RADICULOPATHY, LUMBAR REGION: ICD-10-CM

## 2023-12-11 PROCEDURE — 97110 THERAPEUTIC EXERCISES: CPT | Mod: GP | Performed by: PHYSICAL THERAPIST

## 2023-12-11 PROCEDURE — 97161 PT EVAL LOW COMPLEX 20 MIN: CPT | Mod: GP | Performed by: PHYSICAL THERAPIST

## 2023-12-11 ASSESSMENT — ENCOUNTER SYMPTOMS
LOSS OF SENSATION IN FEET: 0
OCCASIONAL FEELINGS OF UNSTEADINESS: 0
DEPRESSION: 0

## 2023-12-11 ASSESSMENT — PAIN - FUNCTIONAL ASSESSMENT: PAIN_FUNCTIONAL_ASSESSMENT: 0-10

## 2023-12-11 ASSESSMENT — PAIN SCALES - GENERAL: PAINLEVEL_OUTOF10: 5 - MODERATE PAIN

## 2023-12-11 ASSESSMENT — PAIN DESCRIPTION - DESCRIPTORS: DESCRIPTORS: ACHING

## 2023-12-12 RX ORDER — LISINOPRIL 5 MG/1
5 TABLET ORAL DAILY
Qty: 90 TABLET | Refills: 1 | Status: SHIPPED | OUTPATIENT
Start: 2023-12-12 | End: 2024-05-29

## 2023-12-13 ENCOUNTER — PRE-ADMISSION TESTING (OUTPATIENT)
Dept: PREADMISSION TESTING | Facility: HOSPITAL | Age: 78
End: 2023-12-13
Payer: MEDICARE

## 2023-12-13 VITALS
WEIGHT: 219.58 LBS | SYSTOLIC BLOOD PRESSURE: 148 MMHG | BODY MASS INDEX: 29.1 KG/M2 | DIASTOLIC BLOOD PRESSURE: 72 MMHG | TEMPERATURE: 96.8 F | OXYGEN SATURATION: 96 % | HEIGHT: 73 IN | RESPIRATION RATE: 16 BRPM | HEART RATE: 48 BPM

## 2023-12-13 DIAGNOSIS — Z01.818 PRE-OP TESTING: Primary | ICD-10-CM

## 2023-12-13 PROCEDURE — 99203 OFFICE O/P NEW LOW 30 MIN: CPT | Performed by: NURSE PRACTITIONER

## 2023-12-13 PROCEDURE — 87081 CULTURE SCREEN ONLY: CPT | Mod: STJLAB

## 2023-12-13 RX ORDER — CHLORHEXIDINE GLUCONATE ORAL RINSE 1.2 MG/ML
SOLUTION DENTAL
Qty: 473 ML | Refills: 0 | Status: SHIPPED | OUTPATIENT
Start: 2023-12-13 | End: 2024-02-01 | Stop reason: HOSPADM

## 2023-12-13 ASSESSMENT — CHADS2 SCORE
CHF: NO
HYPERTENSION: YES
DIABETES: YES
PRIOR STROKE OR TIA OR THROMBOEMBOLISM: NO
CHADS2 SCORE: 3
AGE GREATER THAN OR EQUAL TO 75: YES

## 2023-12-13 ASSESSMENT — ACTIVITIES OF DAILY LIVING (ADL): ADL_SCORE: 0

## 2023-12-13 ASSESSMENT — DUKE ACTIVITY SCORE INDEX (DASI)
CAN YOU PARTICIPATE IN MODERATE RECREATIONAL ACTIVITIES LIKE GOLF, BOWLING, DANCING, DOUBLES TENNIS OR THROWING A BASEBALL OR FOOTBALL: NO
CAN YOU RUN A SHORT DISTANCE: NO
CAN YOU WALK INDOORS, SUCH AS AROUND YOUR HOUSE: YES
CAN YOU PARTICIPATE IN STRENOUS SPORTS LIKE SWIMMING, SINGLES TENNIS, FOOTBALL, BASKETBALL, OR SKIING: NO
CAN YOU WALK A BLOCK OR TWO ON LEVEL GROUND: YES
DASI METS SCORE: 7.3
TOTAL_SCORE: 36.7
CAN YOU DO YARD WORK LIKE RAKING LEAVES, WEEDING OR PUSHING A MOWER: YES
CAN YOU CLIMB A FLIGHT OF STAIRS OR WALK UP A HILL: YES
CAN YOU HAVE SEXUAL RELATIONS: YES
CAN YOU DO LIGHT WORK AROUND THE HOUSE LIKE DUSTING OR WASHING DISHES: YES
CAN YOU DO HEAVY WORK AROUND THE HOUSE LIKE SCRUBBING FLOORS OR LIFTING AND MOVING HEAVY FURNITURE: YES
CAN YOU TAKE CARE OF YOURSELF (EAT, DRESS, BATHE, OR USE TOILET): YES
CAN YOU DO MODERATE WORK AROUND THE HOUSE LIKE VACUUMING, SWEEPING FLOORS OR CARRYING GROCERIES: YES

## 2023-12-13 ASSESSMENT — LIFESTYLE VARIABLES: SMOKING_STATUS: NONSMOKER

## 2023-12-13 ASSESSMENT — ENCOUNTER SYMPTOMS
CARDIOVASCULAR NEGATIVE: 1
CONSTITUTIONAL NEGATIVE: 1
GASTROINTESTINAL NEGATIVE: 1

## 2023-12-13 ASSESSMENT — PAIN - FUNCTIONAL ASSESSMENT: PAIN_FUNCTIONAL_ASSESSMENT: 0-10

## 2023-12-13 ASSESSMENT — PAIN SCALES - GENERAL: PAINLEVEL_OUTOF10: 0 - NO PAIN

## 2023-12-13 NOTE — PREPROCEDURE INSTRUCTIONS
Medication List            Accurate as of December 13, 2023 12:24 PM. Always use your most recent med list.                allopurinol 300 mg tablet  Commonly known as: Zyloprim  TAKE 1 TABLET BY MOUTH EVERY DAY  Medication Adjustments for Surgery: Take morning of surgery with sip of water, no other fluids     atorvastatin 10 mg tablet  Commonly known as: Lipitor  TAKE 1 TABLET BY MOUTH EVERY DAY  Medication Adjustments for Surgery: Take morning of surgery with sip of water, no other fluids     blood-glucose meter misc  Needed for Glucose reading     chlorhexidine 0.12 % solution  Commonly known as: Peridex  Sig: 15 mls swish and spit the night before surgery and 15mls swish and spit the morning of surgery do not swallow  Notes to patient: As instructed     cyanocobalamin 1,000 mcg tablet  Commonly known as: Vitamin B-12  Medication Adjustments for Surgery: Stop 7 days before surgery     finasteride 5 mg tablet  Commonly known as: Proscar  TAKE 1 TABLET (5 MG) BY MOUTH ONCE DAILY. DO NOT CRUSH, CHEW, OR SPLIT.  Medication Adjustments for Surgery: Continue until night before surgery     hydroCHLOROthiazide 25 mg tablet  Commonly known as: HYDRODiuril  TAKE ONE HALF TABLET BY MOUTH TWICE DAILY  Medication Adjustments for Surgery: Continue until night before surgery     Jardiance 25 mg  Generic drug: empagliflozin  TAKE 1 TABLET BY MOUTH EVERY DAY  Medication Adjustments for Surgery: Stop 3 days before surgery     lisinopril 5 mg tablet  TAKE 1 TABLET BY MOUTH EVERY DAY  Medication Adjustments for Surgery: Continue until night before surgery     montelukast 10 mg tablet  Commonly known as: Singulair  TAKE 1 TABLET BY MOUTH EVERYDAY AT BEDTIME  Medication Adjustments for Surgery: Take morning of surgery with sip of water, no other fluids     NIFEdipine ER 60 mg 24 hr tablet  Commonly known as: Adalat CC  TAKE 1 TABLET BY MOUTH EVERY DAY  Medication Adjustments for Surgery: Take morning of surgery with sip of water,  no other fluids     nitroglycerin 0.4 mg SL tablet  Commonly known as: Nitrostat  Notes to patient: Take as needed      OneTouch Delica Plus Lancet 33 gauge misc  Generic drug: lancets  2 EACH 2 TIMES A DAY.     * OneTouch Ultra Test strip  Generic drug: blood sugar diagnostic     * ONETOUCH ULTRA BLUE TEST STRIP MISC     pioglitazone 45 mg tablet  Commonly known as: Actos  TAKE 1 TABLET BY MOUTH EVERY DAY  Medication Adjustments for Surgery: Continue until night before surgery     solifenacin 10 mg tablet  Commonly known as: VESIcare  TAKE 1 TABLET (10 MG) BY MOUTH ONCE DAILY. SWALLOW TABLET WHOLE DO NOT CRUSH, CHEW, OR SPLIT.  Medication Adjustments for Surgery: Continue until night before surgery     sotalol  mg tablet  Generic drug: sotalol  TAKE 1 TABLET BY MOUTH TWICE A DAY  Medication Adjustments for Surgery: Take morning of surgery with sip of water, no other fluids     Trelegy Ellipta 200-62.5-25 mcg blister with device  Generic drug: fluticasone-umeclidin-vilanter  Inhale 1 puff once daily.  Notes to patient: Can take the morning of surgery     Xarelto 20 mg tablet  Generic drug: rivaroxaban  TAKE 1 TABLET BY MOUTH EVERY DAY  Notes to patient: Discuss with ordering provider when to stop before surgery           * This list has 2 medication(s) that are the same as other medications prescribed for you. Read the directions carefully, and ask your doctor or other care provider to review them with you.                  PRE-OPERATIVE INSTRUCTIONS    You will receive notification one business day prior to your surgery to confirm your arrival time and additional information. It is important that you answer your phone and/or check your messages during this time.    Please enter the building through the Outpatient entrance. Take the elevator off the lobby to the 2nd floor and check in at the Outpatient Surgery desk    INSTRUCTIONS:  Talk to your surgeon for instructions if you should stop your aspirin, blood  thinner, or diabetes medicines.  DO NOT take any multivitamins or over the counter supplements for 7-10 days before surgery.  If not being admitted, you must have an adult immediately available to drive you home after surgery. We also highly recommend you have someone stay with you for the entire day and night of your surgery.  For children having surgery, a parent or legal guardian must accompany t hem to the surgery center. If this is not possible, please call [phone] to make additional arrangements.  For adults who are unable to consent or make medical decisions for themselves, a legal guardian or Power of  must accompany them to the surgery center. If this is not possible, please call [phone] to make additional arrangements.  Wear comfortable, loose fitting clothing.  All jewelry and piercings must be removed. If you are unable to remove an item or have a dermal piercing, please be sure to tell the nurse when you arrive for surgery.  Nail polish and make-up must be removed.  Avoid smoking or consuming alcohol for 24 hours before surgery.  To help prevent infection, please take a shower/bath and wash your hair the night before and/or morning of surgery.    Additional instructions about eating and drinking before surgery:  Do not eat any solid foods after midnight. Milk, nutritional drinks/supplements, and infant formula are considered solid foods.  You may drink up to 12 oz. of clear liquids up to 2 hours before your arrival time for surgery, unless directed otherwise by your surgeon. Clear liquids include water, non-carbonated sports drinks (Gatorade), black tea or coffee (no creamers) and breast milk.    If you received a blue folder, please review additional information provided inside the folder regarding additional preparation.     If you have any questions or concerns, please call Pre-Admission Testing at [phone].

## 2023-12-13 NOTE — CPM/PAT H&P
CPM/PAT Evaluation       Name: Francis Vang (Francis Vang)  /Age:  y.o.     In-Person       Chief Complaint: back pain    HPI   This is a very pleasant 79 yo with PMhx of HTN, DM, CHRISTY, COPD, GERD, and lumbar radiculopathy.  Pt has right thigh numbness and lower back pain that occasionally radiates across whole back.  Pt describes pain and numbness down LLE.  Pt denies recent fever or chills.      Past Medical History:   Diagnosis Date    Anemia     Arthritis     Asthma     Cataracts, bilateral     CKD (chronic kidney disease) stage 3, GFR 30-59 ml/min (CMS/[facility])     COPD (chronic obstructive pulmonary disease) (CMS/[facility])     Deep vein thrombosis (DVT) of lower extremity (CMS/[facility]) 2013    Diabetes mellitus (CMS/[facility])     Encounter for general adult medical examination without abnormal findings 2020    Encounter for Medicare annual wellness exam    GERD (gastroesophageal reflux disease)     Gout     History of DVT (deep vein thrombosis)     Hyperlipidemia     Hypertension     Neurogenic claudication     OA (osteoarthritis)     RAD (reactive airway disease)     Radiculopathy     Sleep apnea     Spinal stenosis        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    OTHER SURGICAL HISTORY  05/10/2019    Cholecystectomy       Patient  reports that he is not currently sexually active.    Family History   Problem Relation Name Age of Onset    Cancer Mother      Hypertension Father      Cancer Sister      Seizures Sister         No Known Allergies    Prior to Admission medications    Medication Sig Start Date End Date Taking? Authorizing Provider   allopurinol (Zyloprim) 300 mg tablet TAKE 1 TABLET BY MOUTH EVERY DAY 23   Jose Manuel Estrada MD   atorvastatin (Lipitor) 10 mg tablet TAKE 1 TABLET BY MOUTH EVERY DAY 23   ZOIE Santana-CNP   blood sugar diagnostic (ONETOUCH ULTRA BLUE TEST STRIP [facility])  by in vitro route 2 times a day.    Historical Provider, MD   blood-glucose meter misc Needed for Glucose reading 10/17/23   Jose Manuel Estrada MD   celecoxib (CeleBREX) 200 mg capsule Take by mouth. 9/28/07   Historical Provider, MD   cyanocobalamin (Vitamin B-12) 1,000 mcg tablet Take 100 mcg by mouth once daily.    Historical Provider, MD   eszopiclone (Lunesta) 2 mg tablet Take 1 tablet (2 mg) by mouth as needed at bedtime. 8/26/22   Historical Provider, MD   finasteride (Proscar) 5 mg tablet TAKE 1 TABLET (5 MG) BY MOUTH ONCE DAILY. DO NOT CRUSH, CHEW, OR SPLIT. 10/25/23 10/24/24  Jose Manuel Estrada MD   fluticasone-umeclidin-vilanter (Trelegy Ellipta) 200-62.5-25 mcg blister with device Inhale 1 puff once daily. 8/25/23   Jose Manuel Estrada MD   hydroCHLOROthiazide (HYDRODiuril) 25 mg tablet TAKE ONE HALF TABLET BY MOUTH TWICE DAILY 8/14/23   Jose Manuel Estrada MD   Jardiance 25 mg TAKE 1 TABLET BY MOUTH EVERY DAY 9/12/23   Jose Manuel Estrada MD   lisinopril 5 mg tablet TAKE 1 TABLET BY MOUTH EVERY DAY 12/12/23   Jose Manuel Estrada MD   montelukast (Singulair) 10 mg tablet TAKE 1 TABLET BY MOUTH EVERYDAY AT BEDTIME 11/11/23   Jose Manuel Estrada MD   NIFEdipine XL 60 mg 24 hr tablet TAKE 1 TABLET BY MOUTH EVERY DAY 10/3/23   Jose Manuel Estrada MD   nitroglycerin (Nitrostat) 0.4 mg SL tablet Place under the tongue. 10/8/15   Historical Provider, MD   OneTouch Delica Plus Lancet 33 gauge misc 2 EACH 2 TIMES A DAY. 10/18/23   MD Messi Doradouch Ultra Test strip TEST BLOOD GLUCOSE TWO TIMES DAILY .DX. E11.9 3/11/20   Historical Provider, MD   pioglitazone (Actos) 45 mg tablet TAKE 1 TABLET BY MOUTH EVERY DAY 8/18/23   Jose Manuel Estrada MD   solifenacin (VESIcare) 10 mg tablet TAKE 1 TABLET (10 MG) BY MOUTH ONCE DAILY. SWALLOW TABLET WHOLE DO NOT CRUSH, CHEW, OR SPLIT.  Patient not taking: Reported on 12/5/2023 10/7/23 10/6/24  Jose Manuel Estrada MD   sotalol  mg tablet TAKE 1 TABLET BY MOUTH TWICE A DAY  10/3/23   Jose Manuel Estrada MD   tamsulosin (Flomax) 0.4 mg 24 hr capsule Take 1 capsule (0.4 mg) by mouth once daily.    Historical Provider, MD   theophylline ER (Darryl-Dur) 300 mg 12 hr tablet Take 1 tablet (300 mg) by mouth once daily. 9/27/22   Historical Provider, MD   Xarelto 20 mg tablet TAKE 1 TABLET BY MOUTH EVERY DAY 11/14/23   Jose Manuel Estrada MD   lisinopril 5 mg tablet TAKE 1 TABLET BY MOUTH DAILY 5/21/23 12/12/23  Jose Manuel Estrada MD        PAT ROS:   Constitutional:   neg    Neuro/Psych:    Denies neuropathy  No hx of seizure or stroke  Eyes:    use of corrective lenses  Ears:   neg    Nose:   neg    Mouth:   neg    Throat:   neg    Neck:   Cardio:   neg    Respiratory:    Always has SOB or SHELL   No SOB at rest    Endocrine:    A1C 7 type II  GI:   neg    :   neg    Musculoskeletal:    See HPI    Pt reports bilateral hip with sleeping  Occasional swelling in ankles      Hematologic:    On blood thinners for hx of DVT 's x 2 in LE's  Skin:  neg        Physical Exam  Constitutional:       Appearance: Normal appearance.   HENT:      Head: Normocephalic and atraumatic.      Nose: Nose normal.      Mouth/Throat:      Mouth: Mucous membranes are moist.   Eyes:      Pupils: Pupils are equal, round, and reactive to light.   Cardiovascular:      Rate and Rhythm: Normal rate and regular rhythm.   Pulmonary:      Effort: Pulmonary effort is normal.      Breath sounds: Normal breath sounds.   Abdominal:      General: Bowel sounds are normal.      Palpations: Abdomen is soft.   Musculoskeletal:      Cervical back: Normal range of motion.   Skin:     General: Skin is warm and dry.   Neurological:      General: No focal deficit present.      Mental Status: He is alert and oriented to person, place, and time.   Psychiatric:         Mood and Affect: Mood normal.         Behavior: Behavior normal.          Airway  full  Teeth: intact  Anesthesia:  Patient denies any anesthesia complications.   ECG: HR 46  ST T wave  abnormality V3 similar to previous ECG of 2022 prolonged QT    Lab Results   Component Value Date    GLUCOSE 133 (H) 12/04/2023    CALCIUM 9.2 12/04/2023     12/04/2023    K 4.1 12/04/2023    CO2 32 12/04/2023     12/04/2023    BUN 32 (H) 12/04/2023    CREATININE 1.72 (H) 12/04/2023     This SmartLink has not been configured with any valid records.      Lab Results   Component Value Date    WBC 6.1 12/04/2023    HGB 12.4 (L) 12/04/2023    HCT 37.9 (L) 12/04/2023    MCV 98 12/04/2023     12/04/2023     Lab Results   Component Value Date    INR 1.9 (H) 12/04/2023    INR 1.0 09/08/2020    INR 1.1 09/08/2020    PROTIME 21.9 (H) 12/04/2023    PROTIME 11.7 09/08/2020    PROTIME 12.7 09/08/2020     Lab Results   Component Value Date    HGBA1C 7.1 (A) 12/05/2023         Visit Vitals  /72   Pulse (!) 48   Temp 36 °C (96.8 °F) (Temporal)   Resp 16       DASI Risk Score      Flowsheet Row Most Recent Value   DASI SCORE 36.7   METS Score (Will be calculated only when all the questions are answered) 7.3          Caprini DVT Assessment      Flowsheet Row Most Recent Value   DVT Score 12   Current Status COPD, Major surgery planned, including arthroscopic and laproscopic (1-2 hours)   History Prior major surgery, SVT, DVT/PE, COPD   Age Over 75 years   BMI 30 or less          Modified Frailty Index      Flowsheet Row Most Recent Value   Modified Frailty Index Calculator .2727          CHADS2 Stroke Risk  Current as of 23 minutes ago        N/A 3 - 100%: High Risk   2 - 3%: Medium Risk   0 - 2%: Low Risk     Last Change: N/A          This score determines the patient's risk of having a stroke if the patient has atrial fibrillation.        This score is not applicable to this patient. Components are not calculated.          Revised Cardiac Risk Index    No data to display       Apfel Simplified Score      Flowsheet Row Most Recent Value   Apfel Simplified Score Calculator 1          Risk Analysis Index  Results This Encounter         12/13/2023  1118             SAUNDERS Cancer History: Patient does not indicate history of cancer    Total Risk Analysis Index Score Without Cancer: 27    Total Risk Analysis Index Score: 27          Stop Bang Score      Flowsheet Row Most Recent Value   Do you snore loudly? 0   Do you often feel tired or fatigued after your sleep? 1   Has anyone ever observed you stop breathing in your sleep? 1  [CPAP]   Do you have or are you being treated for high blood pressure? 1   Recent BMI (Calculated) 30.1   Is BMI greater than 35 kg/m2? 0=No   Age older than 50 years old? 1=Yes   Is your neck circumference greater than 17 inches (Male) or 16 inches (Female)? 0   Gender - Male 1=Yes   STOP-BANG Total Score 5            Assessment and Plan:     Plan for L2-L4 decompression on 12/27  Most recent labs enclosed  T & S 2 U PRBC's  Pt has follow up medical clearance appt on 12/20

## 2023-12-15 LAB — STAPHYLOCOCCUS SPEC CULT: NORMAL

## 2023-12-20 ENCOUNTER — APPOINTMENT (OUTPATIENT)
Dept: PRIMARY CARE | Facility: CLINIC | Age: 78
End: 2023-12-20
Payer: MEDICARE

## 2023-12-21 ENCOUNTER — OFFICE VISIT (OUTPATIENT)
Dept: PRIMARY CARE | Facility: CLINIC | Age: 78
End: 2023-12-21
Payer: MEDICARE

## 2023-12-21 VITALS
BODY MASS INDEX: 29.26 KG/M2 | DIASTOLIC BLOOD PRESSURE: 76 MMHG | RESPIRATION RATE: 16 BRPM | SYSTOLIC BLOOD PRESSURE: 122 MMHG | HEIGHT: 73 IN | OXYGEN SATURATION: 96 % | WEIGHT: 220.8 LBS | HEART RATE: 50 BPM

## 2023-12-21 DIAGNOSIS — R29.898 WEAKNESS OF LEFT LEG: ICD-10-CM

## 2023-12-21 DIAGNOSIS — I10 PRIMARY HYPERTENSION: ICD-10-CM

## 2023-12-21 DIAGNOSIS — E78.5 DM TYPE 2 WITH DIABETIC DYSLIPIDEMIA (MULTI): ICD-10-CM

## 2023-12-21 DIAGNOSIS — N18.32 STAGE 3B CHRONIC KIDNEY DISEASE (MULTI): ICD-10-CM

## 2023-12-21 DIAGNOSIS — R29.818 NEUROGENIC CLAUDICATION: ICD-10-CM

## 2023-12-21 DIAGNOSIS — E11.69 DM TYPE 2 WITH DIABETIC DYSLIPIDEMIA (MULTI): ICD-10-CM

## 2023-12-21 DIAGNOSIS — Z01.818 PRE-OP TESTING: ICD-10-CM

## 2023-12-21 DIAGNOSIS — E11.9 DIABETES MELLITUS TYPE 2 WITHOUT RETINOPATHY (MULTI): ICD-10-CM

## 2023-12-21 DIAGNOSIS — M48.07 SPINAL STENOSIS OF LUMBOSACRAL REGION: ICD-10-CM

## 2023-12-21 DIAGNOSIS — M47.26 OSTEOARTHRITIS OF SPINE WITH RADICULOPATHY, LUMBAR REGION: ICD-10-CM

## 2023-12-21 PROCEDURE — 1160F RVW MEDS BY RX/DR IN RCRD: CPT | Performed by: FAMILY MEDICINE

## 2023-12-21 PROCEDURE — 1159F MED LIST DOCD IN RCRD: CPT | Performed by: FAMILY MEDICINE

## 2023-12-21 PROCEDURE — 1036F TOBACCO NON-USER: CPT | Performed by: FAMILY MEDICINE

## 2023-12-21 PROCEDURE — 1126F AMNT PAIN NOTED NONE PRSNT: CPT | Performed by: FAMILY MEDICINE

## 2023-12-21 PROCEDURE — 1125F AMNT PAIN NOTED PAIN PRSNT: CPT | Performed by: FAMILY MEDICINE

## 2023-12-21 PROCEDURE — 99214 OFFICE O/P EST MOD 30 MIN: CPT | Performed by: FAMILY MEDICINE

## 2023-12-21 PROCEDURE — 3074F SYST BP LT 130 MM HG: CPT | Performed by: FAMILY MEDICINE

## 2023-12-21 PROCEDURE — 93000 ELECTROCARDIOGRAM COMPLETE: CPT | Performed by: STUDENT IN AN ORGANIZED HEALTH CARE EDUCATION/TRAINING PROGRAM

## 2023-12-21 PROCEDURE — 3078F DIAST BP <80 MM HG: CPT | Performed by: FAMILY MEDICINE

## 2023-12-21 NOTE — PROGRESS NOTES
Subjective   Patient ID: Francis Vang is a 78 y.o. male who presents for Pre-op Exam.    Patient presents in office for a pre op exam. Patient is currently scheduled for 12/27/23. Patient will be having a lumbar decompression done. Patient will be having this performed by Dr. Laureano. Patient admits he has been placed underneath anesthesia in the past and has not experienced any recent complications.          Review of Systems  12 Systems have been reviewed as follows.  Constitutional: Fever, weight gain, weight loss, appetite change, night sweats, fatigue, chills.  Eyes : blurry, double vision, vision, loss, tearing, redness, pain, sensitivity to light, glaucoma.  Ears, nose, mouth, and throat: Hearing loss, ringing in the ears, ear pain, nasal congestion, nasal drainage, nosebleeds, mouth, throat, irritation tooth problem.  Cardiovascular :chest pain, pressure, heart racing, palpitations, sweating, leg swelling, high or low blood pressure  Pulmonary: Cough, yellow or green sputum, blood and sputum, shortness of breath, wheezing  Gastrointestinal: Nausea, vomiting, diarrhea, constipation, pain, blood in stool, or vomitus, heartburn, difficulty swallowing  Genitourinary: incontinence, abnormal bleeding, abnormal discharge, urinary frequency, urinary hesitancy, pain, impotence sexual problem, infection, urinary retention  Musculoskeletal: Pain, stiffness, joint, redness or warmth, arthritis, back pain, weakness, muscle wasting, sprain or fracture  Neuro: Weight weakness, dizziness, change in voice, change in taste change in vision, change in hearing, loss, or change of sensation, trouble walking, balance problems coordination problems, shaking, speech problem  Endocrine , cold or heat intolerance, blood sugar problem, weight gain or loss missed periods hot flashes, sweats, change in body hair, change in libido, increased thirst, increased urination  Heme/lymph: Swelling, bleeding, problem anemia, bruising, enlarged  "lymph nodes  Allergic/immunologic: H. plus nasal drip, watery itchy eyes, nasal drainage, immunosuppressed  The above were reviewed and noted negative except as noted in HPI and Problem List.    Objective   /76 (BP Location: Left arm, Patient Position: Sitting, BP Cuff Size: Adult)   Pulse 50   Resp 16   Ht 1.854 m (6' 1\")   Wt 100 kg (220 lb 12.8 oz)   SpO2 96%   BMI 29.13 kg/m²     Physical Exam  Constitutional: Well developed, well nourished, alert and in no acute distress   Eyes: Normal external exam. Pupils equally round and reactive to light with normal accommodation and extraocular movements intact.  Neck: Supple, no lymphadenopathy or masses.   Cardiovascular: Regular rate and rhythm, normal S1 and S2, no murmurs, gallops, or rubs. Radial pulses normal. No peripheral edema.  Pulmonary: No respiratory distress, lungs clear to auscultation bilaterally. No wheezes, rhonchi, rales.  Abdomen: soft,non tender, non distended, without masses or HSM  Skin: Warm, well perfused, normal skin turgor and color.   Neurologic: Cranial nerves II-XII grossly intact.   Psychiatric: Mood calm and affect normal  Musculoskeletal: Moving all extremities without restriction    Assessment/Plan   Problem List Items Addressed This Visit             ICD-10-CM    DM type 2 with diabetic dyslipidemia (CMS/[facility]) E11.69, E78.5    Relevant Orders    Follow Up In [facility] - PCP - Established    Osteoarthritis of spine with radiculopathy, lumbar region M47.26    Relevant Orders    Follow Up In [facility] - PCP - Established    CKD (chronic kidney disease) N18.9    Relevant Orders    Follow Up In [facility] - PCP - Established    Diabetes mellitus type 2 without retinopathy (CMS/[facility]) E11.9    Relevant Orders    Follow Up In [facility] - PCP - Established    Primary hypertension I10    Relevant Orders    Follow Up In [facility] - PCP - Established    Weakness of left leg " R29.898    Relevant Orders    Follow Up In [facility] - PCP - Established     Other Visit Diagnoses         Codes    Spinal stenosis of lumbosacral region     M48.07    Relevant Orders    Follow Up In [facility] - PCP - Established    Neurogenic claudication     R29.818    Relevant Orders    Follow Up In [facility] - PCP - Established    Pre-op testing     Z01.818          Medically cleared    Nuclear stress wnl 10/2022    Surgery on back scheduled 12/27/23 with ..   Right leg has pain with weakness.,     Patient does not feel breztri helped symptoms     Consider CT A & P next     MRI prostate WNL      Urine & bowel incont     Improved with vesicare & proscar     Could not tolerate metformin       PSA q 6 months       Christmas day take xarelto in the morning and stop until the da;y after surgery if no bleeding is occurring.

## 2023-12-26 ENCOUNTER — TELEPHONE (OUTPATIENT)
Dept: NEUROSURGERY | Facility: CLINIC | Age: 78
End: 2023-12-26
Payer: MEDICARE

## 2023-12-26 DIAGNOSIS — M48.062 LUMBAR STENOSIS WITH NEUROGENIC CLAUDICATION: ICD-10-CM

## 2023-12-28 DIAGNOSIS — I10 ESSENTIAL (PRIMARY) HYPERTENSION: ICD-10-CM

## 2023-12-28 RX ORDER — HYDROCHLOROTHIAZIDE 25 MG/1
TABLET ORAL
Qty: 90 TABLET | Refills: 1 | Status: SHIPPED | OUTPATIENT
Start: 2023-12-28

## 2024-01-04 ENCOUNTER — TELEPHONE (OUTPATIENT)
Dept: NEUROSURGERY | Facility: CLINIC | Age: 79
End: 2024-01-04
Payer: MEDICARE

## 2024-01-10 ENCOUNTER — APPOINTMENT (OUTPATIENT)
Dept: PRIMARY CARE | Facility: CLINIC | Age: 79
End: 2024-01-10
Payer: MEDICARE

## 2024-01-15 ENCOUNTER — APPOINTMENT (OUTPATIENT)
Dept: PRIMARY CARE | Facility: CLINIC | Age: 79
End: 2024-01-15
Payer: MEDICARE

## 2024-01-15 ENCOUNTER — DOCUMENTATION (OUTPATIENT)
Dept: PHYSICAL THERAPY | Facility: CLINIC | Age: 79
End: 2024-01-15

## 2024-01-15 NOTE — PROGRESS NOTES
Physical Therapy    Discharge Summary    Name: Francis Vang  MRN: [id number]  : 1945  Date: 01/15/24    Discharge Summary: PT    Discharge Information: Date of discharge 1.15.24, Date of last visit 23, Date of evaluation 23, Number of attended visits 1, Referred by Dr. Laureano, and Referred for lumbar stenosis with neurogenic claudication    Therapy Summary: Patient was seen for his initial evaluation and cancelled remaining appointments    Discharge Status: unable to determine     Rehab Discharge Reason: patient called in on 1.15.24 stating to cancel his appt on 24 which was his last scheduled appt.  He did not return after the initial evaluation.

## 2024-01-16 ENCOUNTER — PRE-ADMISSION TESTING (OUTPATIENT)
Dept: PREADMISSION TESTING | Facility: HOSPITAL | Age: 79
End: 2024-01-16
Payer: MEDICARE

## 2024-01-16 ENCOUNTER — LAB (OUTPATIENT)
Dept: LAB | Facility: LAB | Age: 79
End: 2024-01-16
Payer: MEDICARE

## 2024-01-16 ENCOUNTER — APPOINTMENT (OUTPATIENT)
Dept: PHYSICAL THERAPY | Facility: CLINIC | Age: 79
End: 2024-01-16

## 2024-01-16 VITALS
HEIGHT: 73 IN | WEIGHT: 220.24 LBS | BODY MASS INDEX: 29.19 KG/M2 | TEMPERATURE: 96.6 F | OXYGEN SATURATION: 96 % | RESPIRATION RATE: 16 BRPM | SYSTOLIC BLOOD PRESSURE: 151 MMHG | HEART RATE: 49 BPM | DIASTOLIC BLOOD PRESSURE: 68 MMHG

## 2024-01-16 DIAGNOSIS — M54.10 RADICULOPATHY, UNSPECIFIED SPINAL REGION: Primary | ICD-10-CM

## 2024-01-16 DIAGNOSIS — Z01.818 PREOP EXAMINATION: ICD-10-CM

## 2024-01-16 DIAGNOSIS — M54.10 RADICULOPATHY, UNSPECIFIED SPINAL REGION: ICD-10-CM

## 2024-01-16 LAB
ABO GROUP (TYPE) IN BLOOD: NORMAL
ANION GAP SERPL CALC-SCNC: 13 MMOL/L (ref 10–20)
ANTIBODY SCREEN: NORMAL
BUN SERPL-MCNC: 28 MG/DL (ref 6–23)
CALCIUM SERPL-MCNC: 9.3 MG/DL (ref 8.6–10.3)
CHLORIDE SERPL-SCNC: 101 MMOL/L (ref 98–107)
CO2 SERPL-SCNC: 29 MMOL/L (ref 21–32)
CREAT SERPL-MCNC: 1.79 MG/DL (ref 0.5–1.3)
EGFRCR SERPLBLD CKD-EPI 2021: 39 ML/MIN/1.73M*2
ERYTHROCYTE [DISTWIDTH] IN BLOOD BY AUTOMATED COUNT: 15.9 % (ref 11.5–14.5)
GLUCOSE SERPL-MCNC: 150 MG/DL (ref 74–99)
HCT VFR BLD AUTO: 41.5 % (ref 41–52)
HGB BLD-MCNC: 13 G/DL (ref 13.5–17.5)
MCH RBC QN AUTO: 31.6 PG (ref 26–34)
MCHC RBC AUTO-ENTMCNC: 31.3 G/DL (ref 32–36)
MCV RBC AUTO: 101 FL (ref 80–100)
NRBC BLD-RTO: 0.3 /100 WBCS (ref 0–0)
PLATELET # BLD AUTO: 228 X10*3/UL (ref 150–450)
POTASSIUM SERPL-SCNC: 4 MMOL/L (ref 3.5–5.3)
RBC # BLD AUTO: 4.12 X10*6/UL (ref 4.5–5.9)
RH FACTOR (ANTIGEN D): NORMAL
SODIUM SERPL-SCNC: 139 MMOL/L (ref 136–145)
WBC # BLD AUTO: 6.6 X10*3/UL (ref 4.4–11.3)

## 2024-01-16 PROCEDURE — 36415 COLL VENOUS BLD VENIPUNCTURE: CPT

## 2024-01-16 PROCEDURE — 87081 CULTURE SCREEN ONLY: CPT | Mod: STJLAB

## 2024-01-16 PROCEDURE — 86901 BLOOD TYPING SEROLOGIC RH(D): CPT

## 2024-01-16 PROCEDURE — 86850 RBC ANTIBODY SCREEN: CPT

## 2024-01-16 PROCEDURE — 99214 OFFICE O/P EST MOD 30 MIN: CPT

## 2024-01-16 PROCEDURE — 80048 BASIC METABOLIC PNL TOTAL CA: CPT

## 2024-01-16 PROCEDURE — 85027 COMPLETE CBC AUTOMATED: CPT

## 2024-01-16 PROCEDURE — 86900 BLOOD TYPING SEROLOGIC ABO: CPT

## 2024-01-16 ASSESSMENT — DUKE ACTIVITY SCORE INDEX (DASI)
CAN YOU PARTICIPATE IN MODERATE RECREATIONAL ACTIVITIES LIKE GOLF, BOWLING, DANCING, DOUBLES TENNIS OR THROWING A BASEBALL OR FOOTBALL: NO
CAN YOU WALK A BLOCK OR TWO ON LEVEL GROUND: YES
TOTAL_SCORE: 31.45
DASI METS SCORE: 6.6
CAN YOU TAKE CARE OF YOURSELF (EAT, DRESS, BATHE, OR USE TOILET): YES
CAN YOU DO MODERATE WORK AROUND THE HOUSE LIKE VACUUMING, SWEEPING FLOORS OR CARRYING GROCERIES: YES
CAN YOU DO HEAVY WORK AROUND THE HOUSE LIKE SCRUBBING FLOORS OR LIFTING AND MOVING HEAVY FURNITURE: YES
CAN YOU PARTICIPATE IN STRENOUS SPORTS LIKE SWIMMING, SINGLES TENNIS, FOOTBALL, BASKETBALL, OR SKIING: NO
CAN YOU RUN A SHORT DISTANCE: NO
CAN YOU HAVE SEXUAL RELATIONS: NO
CAN YOU DO LIGHT WORK AROUND THE HOUSE LIKE DUSTING OR WASHING DISHES: YES
CAN YOU WALK INDOORS, SUCH AS AROUND YOUR HOUSE: YES
CAN YOU DO YARD WORK LIKE RAKING LEAVES, WEEDING OR PUSHING A MOWER: YES
CAN YOU CLIMB A FLIGHT OF STAIRS OR WALK UP A HILL: YES

## 2024-01-16 ASSESSMENT — CHADS2 SCORE
HYPERTENSION: YES
CHF: NO
AGE GREATER THAN OR EQUAL TO 75: YES
DIABETES: YES
PRIOR STROKE OR TIA OR THROMBOEMBOLISM: NO
CHADS2 SCORE: 3

## 2024-01-16 ASSESSMENT — LIFESTYLE VARIABLES: SMOKING_STATUS: NONSMOKER

## 2024-01-16 ASSESSMENT — PAIN SCALES - GENERAL: PAINLEVEL_OUTOF10: 0 - NO PAIN

## 2024-01-16 ASSESSMENT — ACTIVITIES OF DAILY LIVING (ADL): ADL_SCORE: 0

## 2024-01-16 ASSESSMENT — PAIN - FUNCTIONAL ASSESSMENT: PAIN_FUNCTIONAL_ASSESSMENT: 0-10

## 2024-01-16 NOTE — PREPROCEDURE INSTRUCTIONS
Medication List            Accurate as of January 16, 2024  9:45 AM. Always use your most recent med list.                allopurinol 300 mg tablet  Commonly known as: Zyloprim  TAKE 1 TABLET BY MOUTH EVERY DAY  Medication Adjustments for Surgery: Take morning of surgery with sip of water, no other fluids     atorvastatin 10 mg tablet  Commonly known as: Lipitor  TAKE 1 TABLET BY MOUTH EVERY DAY  Medication Adjustments for Surgery: Take morning of surgery with sip of water, no other fluids     blood-glucose meter misc  Needed for Glucose reading     chlorhexidine 0.12 % solution  Commonly known as: Peridex  Sig: 15 mls swish and spit the night before surgery and 15mls swish and spit the morning of surgery do not swallow     cyanocobalamin 1,000 mcg tablet  Commonly known as: Vitamin B-12  Medication Adjustments for Surgery: Stop 7 days before surgery     finasteride 5 mg tablet  Commonly known as: Proscar  TAKE 1 TABLET (5 MG) BY MOUTH ONCE DAILY. DO NOT CRUSH, CHEW, OR SPLIT.  Medication Adjustments for Surgery: Take morning of surgery with sip of water, no other fluids     hydroCHLOROthiazide 25 mg tablet  Commonly known as: HYDRODiuril  TAKE 1/2 TABLET TWICE A DAY BY MOUTH  Medication Adjustments for Surgery: Take morning of surgery with sip of water, no other fluids     Jardiance 25 mg  Generic drug: empagliflozin  TAKE 1 TABLET BY MOUTH EVERY DAY  Medication Adjustments for Surgery: Stop 3 days before surgery     lisinopril 5 mg tablet  TAKE 1 TABLET BY MOUTH EVERY DAY  Medication Adjustments for Surgery: Other (Comment)  Notes to patient: Please only take AM dose the day prior to surgery      montelukast 10 mg tablet  Commonly known as: Singulair  TAKE 1 TABLET BY MOUTH EVERYDAY AT BEDTIME  Medication Adjustments for Surgery: Continue until night before surgery     NIFEdipine ER 60 mg 24 hr tablet  Commonly known as: Adalat CC  TAKE 1 TABLET BY MOUTH EVERY DAY  Medication Adjustments for Surgery: Take  morning of surgery with sip of water, no other fluids     nitroglycerin 0.4 mg SL tablet  Commonly known as: Nitrostat  Medication Adjustments for Surgery: Other (Comment)  Notes to patient: As needed     OneTouch Delica Plus Lancet 33 gauge misc  Generic drug: lancets  2 EACH 2 TIMES A DAY.     pioglitazone 45 mg tablet  Commonly known as: Actos  TAKE 1 TABLET BY MOUTH EVERY DAY  Medication Adjustments for Surgery: Continue until night before surgery     solifenacin 10 mg tablet  Commonly known as: VESIcare  TAKE 1 TABLET (10 MG) BY MOUTH ONCE DAILY. SWALLOW TABLET WHOLE DO NOT CRUSH, CHEW, OR SPLIT.  Medication Adjustments for Surgery: Continue until night before surgery     sotalol  mg tablet  Generic drug: sotalol  TAKE 1 TABLET BY MOUTH TWICE A DAY  Medication Adjustments for Surgery: Take morning of surgery with sip of water, no other fluids     Xarelto 20 mg tablet  Generic drug: rivaroxaban  TAKE 1 TABLET BY MOUTH EVERY DAY  Medication Adjustments for Surgery: Other (Comment)  Notes to patient: Coordinate with prescribing provider for further instructions on this medications prior to surgery.              PRE-OPERATIVE INSTRUCTIONS    You will receive notification one business day prior to your surgery to confirm your arrival time and additional information. It is important that you answer your phone and/or check your messages during this time.    Please enter the building through the Outpatient entrance. Take the elevator off the lobby to the 2nd floor and check in at the Outpatient Surgery desk    INSTRUCTIONS:  Talk to your surgeon for instructions if you should stop your aspirin, blood thinner, or diabetes medicines.  DO NOT take any multivitamins or over the counter supplements for 7-10 days before surgery.  If not being admitted, you must have an adult immediately available to drive you home after surgery. We also highly recommend you have someone stay with you for the entire day and night of  your surgery.  For children having surgery, a parent or legal guardian must accompany t hem to the surgery center. If this is not possible, please call [phone] to make additional arrangements.  For adults who are unable to consent or make medical decisions for themselves, a legal guardian or Power of  must accompany them to the surgery center. If this is not possible, please call [phone] to make additional arrangements.  Wear comfortable, loose fitting clothing.  All jewelry and piercings must be removed. If you are unable to remove an item or have a dermal piercing, please be sure to tell the nurse when you arrive for surgery.  Nail polish and make-up must be removed.  Avoid smoking or consuming alcohol for 24 hours before surgery.  To help prevent infection, please take a shower/bath and wash your hair the night before and/or morning of surgery.    Additional instructions about eating and drinking before surgery:  Do not eat any solid foods after midnight. Milk, nutritional drinks/supplements, and infant formula are considered solid foods.  You may drink up to 12 oz. of clear liquids up to 2 hours before your arrival time for surgery, unless directed otherwise by your surgeon. Clear liquids include water, non-carbonated sports drinks (Gatorade), black tea or coffee (no creamers) and breast milk.    If you received a blue folder, please review additional information provided inside the folder regarding additional preparation.     If you have any questions or concerns, please call Pre-Admission Testing at [phone].

## 2024-01-16 NOTE — CPM/PAT H&P
CPM/PAT Evaluation       Name: Francis Vang (Francis Vang)  /Age: 1945/78 y.o.     In-Person       Chief Complaint: Low back pain    HPI  Pleasant 77 y/o male presents with lower back pain. He has been having this pain for years. States he has had x3 back surgeries without relief of symptoms. States that pain radiates down his bilat. Legs. Denies any other associated symptoms. Denies recent fever/illness/chills.     Past Medical History:   Diagnosis Date    Anemia     Arthritis     Asthma     Cataracts, bilateral     Chronic anticoagulation     CKD (chronic kidney disease) stage 3, GFR 30-59 ml/min (CMS/[facility])     COPD (chronic obstructive pulmonary disease) (CMS/[facility])     Deep vein thrombosis (DVT) of lower extremity (CMS/[facility]) 2013    left leg    Diabetes mellitus (CMS/[facility])     DVT (deep venous thrombosis) (CMS/[facility])     Encounter for general adult medical examination without abnormal findings 2020    Encounter for Medicare annual wellness exam    GERD (gastroesophageal reflux disease)     Gout     History of DVT (deep vein thrombosis)     Hyperlipidemia     Hypertension     Neurogenic claudication     OA (osteoarthritis)     RAD (reactive airway disease)     Radiculopathy     Sleep apnea     CPAP    Spinal stenosis        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    OTHER SURGICAL HISTORY  05/10/2019    Cholecystectomy    OTHER SURGICAL HISTORY      x3 back surgeries       Patient  reports that he is not currently sexually active.    Family History   Problem Relation Name Age of Onset    Cancer Mother      Hypertension Father      Cancer Sister      Seizures Sister         Allergies   Allergen Reactions    Ragweed Pollen Other     Summer seasonal allergies       Prior to Admission medications    Medication Sig Start Date End Date Taking? Authorizing Provider   allopurinol (Zyloprim) 300 mg tablet TAKE 1 TABLET  BY MOUTH EVERY DAY 11/11/23   Jose Manuel Estrada MD   atorvastatin (Lipitor) 10 mg tablet TAKE 1 TABLET BY MOUTH EVERY DAY 7/20/23   ZOIE Santana-CNP   blood-glucose meter misc Needed for Glucose reading 10/17/23   Jose Manuel Estrada MD   chlorhexidine (Peridex) 0.12 % solution Sig: 15 mls swish and spit the night before surgery and 15mls swish and spit the morning of surgery do not swallow 12/13/23   ZOIE Watts-CNP   cyanocobalamin (Vitamin B-12) 1,000 mcg tablet Take 1 tablet (1,000 mcg) by mouth once daily.    Historical Provider, MD   finasteride (Proscar) 5 mg tablet TAKE 1 TABLET (5 MG) BY MOUTH ONCE DAILY. DO NOT CRUSH, CHEW, OR SPLIT. 10/25/23 10/24/24  Jose Manuel Estrada MD   hydroCHLOROthiazide (HYDRODiuril) 25 mg tablet TAKE 1/2 TABLET TWICE A DAY BY MOUTH  Patient taking differently: Take 1 tablet (25 mg) by mouth once daily. 12/28/23   Jose Manuel Estrada MD   Jardiance 25 mg TAKE 1 TABLET BY MOUTH EVERY DAY 9/12/23   Jose Manuel Estrada MD   lisinopril 5 mg tablet TAKE 1 TABLET BY MOUTH EVERY DAY 12/12/23   Jose Manuel Estrada MD   montelukast (Singulair) 10 mg tablet TAKE 1 TABLET BY MOUTH EVERYDAY AT BEDTIME 11/11/23   Jose Manuel Estrada MD   NIFEdipine XL 60 mg 24 hr tablet TAKE 1 TABLET BY MOUTH EVERY DAY 10/3/23   Jose Manuel Estrada MD   nitroglycerin (Nitrostat) 0.4 mg SL tablet Place 1 tablet (0.4 mg) under the tongue every 5 minutes if needed for chest pain. 10/8/15   Historical Provider, MD   OneTouch Delica Plus Lancet 33 gauge misc 2 EACH 2 TIMES A DAY. 10/18/23   Jose Manuel Estrada MD   pioglitazone (Actos) 45 mg tablet TAKE 1 TABLET BY MOUTH EVERY DAY 8/18/23   Jose Manuel Estrada MD   solifenacin (VESIcare) 10 mg tablet TAKE 1 TABLET (10 MG) BY MOUTH ONCE DAILY. SWALLOW TABLET WHOLE DO NOT CRUSH, CHEW, OR SPLIT. 10/7/23 10/6/24  Jose Manuel Estrada MD   sotalol  mg tablet TAKE 1 TABLET BY MOUTH TWICE A DAY 10/3/23   Jose Manuel Estrada MD   Xarelto 20 mg tablet TAKE 1 TABLET BY  MOUTH EVERY DAY 11/14/23   Jose Manuel Estrada MD   blood sugar diagnostic (ONETOUCH ULTRA BLUE TEST STRIP MISC) by in vitro route 2 times a day.  1/16/24  Historical Provider, MD   fluticasone-umeclidin-vilanter (Trelegy Ellipta) 200-62.5-25 mcg blister with device Inhale 1 puff once daily. 8/25/23 1/16/24  Jose Manuel Estrada MD   OneTouch Ultra Test strip TEST BLOOD GLUCOSE TWO TIMES DAILY .DX. E11.9 3/11/20 1/16/24  Historical Provider, MD        Constitutional: Negative for fever, chills, or sweats   ENMT: Negative for nasal discharge, congestion, ear pain, mouth pain, throat pain. Positive for reading glasses. Positive for chronic sinus congestion.    Respiratory: Negative for cough, wheezing, shortness of breath. Positive for chronic dyspnea on exertion-follows with Dr. Estrada for this.    Cardiac: Negative for chest pain, dyspnea on exertion, palpitations. Positive for chronic chest discomfort-has been occurring for over 5 years intermittently. No acute changes.   Gastrointestinal: Negative for nausea, vomiting, diarrhea, constipation, abdominal pain  Genitourinary: Negative for dysuria, flank pain, frequency, hematuria   Musculoskeletal: Negative for decreased ROM, pain, swelling, weakness. Positive for bilat. Knee arthritic pain. Positive for low back pain that radiates down his bilat.legs.    Neurological: Negative for dizziness, confusion, headache  Psychiatric: Negative for mood changes   Skin: Negative for itching, rash, ulcer    Hematologic/Lymph: Negative for bruising, easy bleeding  Allergic/Immunologic: Negative itching, sneezing, swelling      Physical Exam  Vitals reviewed.   Constitutional:       Appearance: Normal appearance.   HENT:      Head: Normocephalic.      Mouth/Throat:      Mouth: Mucous membranes are moist.      Pharynx: Oropharynx is clear.   Eyes:      Pupils: Pupils are equal, round, and reactive to light.   Cardiovascular:      Rate and Rhythm: Regular rhythm. Bradycardia present.       Heart sounds: Normal heart sounds.   Pulmonary:      Effort: Pulmonary effort is normal.      Breath sounds: Normal breath sounds.   Abdominal:      General: Bowel sounds are normal.      Palpations: Abdomen is soft.   Musculoskeletal:         General: Normal range of motion.      Cervical back: Normal range of motion.   Skin:     General: Skin is warm and dry.   Neurological:      General: No focal deficit present.      Mental Status: He is alert and oriented to person, place, and time.   Psychiatric:         Mood and Affect: Mood normal.         Behavior: Behavior normal.        PAT AIRWAY:   Airway:     Mallampati::  II    Neck ROM::  Full  normal        Visit Vitals  /68   Pulse (!) 49   Temp 35.9 °C (96.6 °F) (Temporal)   Resp 16       DASI Risk Score      Flowsheet Row Most Recent Value   DASI SCORE 31.45   METS Score (Will be calculated only when all the questions are answered) 6.6          Caprini DVT Assessment      Flowsheet Row Most Recent Value   DVT Score 10   Current Status Major surgery planned, including arthroscopic and laproscopic (1-2 hours)   History SVT, DVT/PE, Prior major surgery   Age Over 75 years   BMI 30 or less          Modified Frailty Index      Flowsheet Row Most Recent Value   Modified Frailty Index Calculator .1818          CHADS2 Stroke Risk  Current as of 42 minutes ago        N/A 3 - 100%: High Risk   2 - 3%: Medium Risk   0 - 2%: Low Risk     Last Change: N/A          This score determines the patient's risk of having a stroke if the patient has atrial fibrillation.        This score is not applicable to this patient. Components are not calculated.          Revised Cardiac Risk Index      Flowsheet Row Most Recent Value   Revised Cardiac Risk Calculator 1          Apfel Simplified Score    No data to display       Risk Analysis Index Results This Encounter         1/16/2024  0919             SAUNDERS Cancer History: Patient does not indicate history of cancer    Total Risk  Analysis Index Score Without Cancer: 27    Total Risk Analysis Index Score: 27          Stop Bang Score      Flowsheet Row Most Recent Value   Do you snore loudly? 0   Do you often feel tired or fatigued after your sleep? 1   Has anyone ever observed you stop breathing in your sleep? 1   Do you have or are you being treated for high blood pressure? 1   Recent BMI (Calculated) 29.1   Is BMI greater than 35 kg/m2? 0=No   Age older than 50 years old? 1=Yes   Is your neck circumference greater than 17 inches (Male) or 16 inches (Female)? 1   Gender - Male 1=Yes   STOP-BANG Total Score 6            Assessment and Plan:     OR with Dr. Laureano on 1/24  Labs ordered per anesthesia guidelines.   EKG on file from 12/13/23. CXR on file from 11/20/23.   Patient states his heart rate is chronically on the lower side.     Patient has his mouthwash from previous surgery cancellation-no new prescription sent.     Patient states he has woken up gagging/coughing after anesthesia. Denies any other complications after anesthesia.   See risk scores as previously documented.     POC HEMOGLOBIN A1c   Date Value Ref Range Status   12/05/2023 7.1 (A) 4.2 - 6.5 % Final

## 2024-01-16 NOTE — H&P (VIEW-ONLY)
CPM/PAT Evaluation       Name: Francis Vang (Francis Vang)  /Age: 1945/78 y.o.     In-Person       Chief Complaint: Low back pain    HPI  Pleasant 79 y/o male presents with lower back pain. He has been having this pain for years. States he has had x3 back surgeries without relief of symptoms. States that pain radiates down his bilat. Legs. Denies any other associated symptoms. Denies recent fever/illness/chills.     Past Medical History:   Diagnosis Date    Anemia     Arthritis     Asthma     Cataracts, bilateral     Chronic anticoagulation     CKD (chronic kidney disease) stage 3, GFR 30-59 ml/min (CMS/[facility])     COPD (chronic obstructive pulmonary disease) (CMS/[facility])     Deep vein thrombosis (DVT) of lower extremity (CMS/[facility]) 2013    left leg    Diabetes mellitus (CMS/[facility])     DVT (deep venous thrombosis) (CMS/[facility])     Encounter for general adult medical examination without abnormal findings 2020    Encounter for Medicare annual wellness exam    GERD (gastroesophageal reflux disease)     Gout     History of DVT (deep vein thrombosis)     Hyperlipidemia     Hypertension     Neurogenic claudication     OA (osteoarthritis)     RAD (reactive airway disease)     Radiculopathy     Sleep apnea     CPAP    Spinal stenosis        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    OTHER SURGICAL HISTORY  05/10/2019    Cholecystectomy    OTHER SURGICAL HISTORY      x3 back surgeries       Patient  reports that he is not currently sexually active.    Family History   Problem Relation Name Age of Onset    Cancer Mother      Hypertension Father      Cancer Sister      Seizures Sister         Allergies   Allergen Reactions    Ragweed Pollen Other     Summer seasonal allergies       Prior to Admission medications    Medication Sig Start Date End Date Taking? Authorizing Provider   allopurinol (Zyloprim) 300 mg tablet TAKE 1 TABLET  BY MOUTH EVERY DAY 11/11/23   Jose Manuel Estrada MD   atorvastatin (Lipitor) 10 mg tablet TAKE 1 TABLET BY MOUTH EVERY DAY 7/20/23   ZOIE Santana-CNP   blood-glucose meter misc Needed for Glucose reading 10/17/23   Jose Manuel Estrada MD   chlorhexidine (Peridex) 0.12 % solution Sig: 15 mls swish and spit the night before surgery and 15mls swish and spit the morning of surgery do not swallow 12/13/23   ZOIE Watts-CNP   cyanocobalamin (Vitamin B-12) 1,000 mcg tablet Take 1 tablet (1,000 mcg) by mouth once daily.    Historical Provider, MD   finasteride (Proscar) 5 mg tablet TAKE 1 TABLET (5 MG) BY MOUTH ONCE DAILY. DO NOT CRUSH, CHEW, OR SPLIT. 10/25/23 10/24/24  Jose Manuel Estrada MD   hydroCHLOROthiazide (HYDRODiuril) 25 mg tablet TAKE 1/2 TABLET TWICE A DAY BY MOUTH  Patient taking differently: Take 1 tablet (25 mg) by mouth once daily. 12/28/23   Jose Manuel Estrada MD   Jardiance 25 mg TAKE 1 TABLET BY MOUTH EVERY DAY 9/12/23   Jose Manuel Estrada MD   lisinopril 5 mg tablet TAKE 1 TABLET BY MOUTH EVERY DAY 12/12/23   Jose Manuel Estrada MD   montelukast (Singulair) 10 mg tablet TAKE 1 TABLET BY MOUTH EVERYDAY AT BEDTIME 11/11/23   Jose Manuel Estrada MD   NIFEdipine XL 60 mg 24 hr tablet TAKE 1 TABLET BY MOUTH EVERY DAY 10/3/23   Jose Manuel Estrada MD   nitroglycerin (Nitrostat) 0.4 mg SL tablet Place 1 tablet (0.4 mg) under the tongue every 5 minutes if needed for chest pain. 10/8/15   Historical Provider, MD   OneTouch Delica Plus Lancet 33 gauge misc 2 EACH 2 TIMES A DAY. 10/18/23   Jose Manuel Estrada MD   pioglitazone (Actos) 45 mg tablet TAKE 1 TABLET BY MOUTH EVERY DAY 8/18/23   Jose Manuel Estrada MD   solifenacin (VESIcare) 10 mg tablet TAKE 1 TABLET (10 MG) BY MOUTH ONCE DAILY. SWALLOW TABLET WHOLE DO NOT CRUSH, CHEW, OR SPLIT. 10/7/23 10/6/24  Jose Manuel Estrada MD   sotalol  mg tablet TAKE 1 TABLET BY MOUTH TWICE A DAY 10/3/23   Jose Manuel Estrada MD   Xarelto 20 mg tablet TAKE 1 TABLET BY  MOUTH EVERY DAY 11/14/23   Jose Manuel Estrada MD   blood sugar diagnostic (ONETOUCH ULTRA BLUE TEST STRIP MISC) by in vitro route 2 times a day.  1/16/24  Historical Provider, MD   fluticasone-umeclidin-vilanter (Trelegy Ellipta) 200-62.5-25 mcg blister with device Inhale 1 puff once daily. 8/25/23 1/16/24  Jose Manuel Estrada MD   OneTouch Ultra Test strip TEST BLOOD GLUCOSE TWO TIMES DAILY .DX. E11.9 3/11/20 1/16/24  Historical Provider, MD        Constitutional: Negative for fever, chills, or sweats   ENMT: Negative for nasal discharge, congestion, ear pain, mouth pain, throat pain. Positive for reading glasses. Positive for chronic sinus congestion.    Respiratory: Negative for cough, wheezing, shortness of breath. Positive for chronic dyspnea on exertion-follows with Dr. Estrada for this.    Cardiac: Negative for chest pain, dyspnea on exertion, palpitations. Positive for chronic chest discomfort-has been occurring for over 5 years intermittently. No acute changes.   Gastrointestinal: Negative for nausea, vomiting, diarrhea, constipation, abdominal pain  Genitourinary: Negative for dysuria, flank pain, frequency, hematuria   Musculoskeletal: Negative for decreased ROM, pain, swelling, weakness. Positive for bilat. Knee arthritic pain. Positive for low back pain that radiates down his bilat.legs.    Neurological: Negative for dizziness, confusion, headache  Psychiatric: Negative for mood changes   Skin: Negative for itching, rash, ulcer    Hematologic/Lymph: Negative for bruising, easy bleeding  Allergic/Immunologic: Negative itching, sneezing, swelling      Physical Exam  Vitals reviewed.   Constitutional:       Appearance: Normal appearance.   HENT:      Head: Normocephalic.      Mouth/Throat:      Mouth: Mucous membranes are moist.      Pharynx: Oropharynx is clear.   Eyes:      Pupils: Pupils are equal, round, and reactive to light.   Cardiovascular:      Rate and Rhythm: Regular rhythm. Bradycardia present.       Heart sounds: Normal heart sounds.   Pulmonary:      Effort: Pulmonary effort is normal.      Breath sounds: Normal breath sounds.   Abdominal:      General: Bowel sounds are normal.      Palpations: Abdomen is soft.   Musculoskeletal:         General: Normal range of motion.      Cervical back: Normal range of motion.   Skin:     General: Skin is warm and dry.   Neurological:      General: No focal deficit present.      Mental Status: He is alert and oriented to person, place, and time.   Psychiatric:         Mood and Affect: Mood normal.         Behavior: Behavior normal.        PAT AIRWAY:   Airway:     Mallampati::  II    Neck ROM::  Full  normal        Visit Vitals  /68   Pulse (!) 49   Temp 35.9 °C (96.6 °F) (Temporal)   Resp 16       DASI Risk Score      Flowsheet Row Most Recent Value   DASI SCORE 31.45   METS Score (Will be calculated only when all the questions are answered) 6.6          Caprini DVT Assessment      Flowsheet Row Most Recent Value   DVT Score 10   Current Status Major surgery planned, including arthroscopic and laproscopic (1-2 hours)   History SVT, DVT/PE, Prior major surgery   Age Over 75 years   BMI 30 or less          Modified Frailty Index      Flowsheet Row Most Recent Value   Modified Frailty Index Calculator .1818          CHADS2 Stroke Risk  Current as of 42 minutes ago        N/A 3 - 100%: High Risk   2 - 3%: Medium Risk   0 - 2%: Low Risk     Last Change: N/A          This score determines the patient's risk of having a stroke if the patient has atrial fibrillation.        This score is not applicable to this patient. Components are not calculated.          Revised Cardiac Risk Index      Flowsheet Row Most Recent Value   Revised Cardiac Risk Calculator 1          Apfel Simplified Score    No data to display       Risk Analysis Index Results This Encounter         1/16/2024  0919             SAUNDERS Cancer History: Patient does not indicate history of cancer    Total Risk  Analysis Index Score Without Cancer: 27    Total Risk Analysis Index Score: 27          Stop Bang Score      Flowsheet Row Most Recent Value   Do you snore loudly? 0   Do you often feel tired or fatigued after your sleep? 1   Has anyone ever observed you stop breathing in your sleep? 1   Do you have or are you being treated for high blood pressure? 1   Recent BMI (Calculated) 29.1   Is BMI greater than 35 kg/m2? 0=No   Age older than 50 years old? 1=Yes   Is your neck circumference greater than 17 inches (Male) or 16 inches (Female)? 1   Gender - Male 1=Yes   STOP-BANG Total Score 6            Assessment and Plan:     OR with Dr. Laureano on 1/24  Labs ordered per anesthesia guidelines.   EKG on file from 12/13/23. CXR on file from 11/20/23.   Patient states his heart rate is chronically on the lower side.     Patient has his mouthwash from previous surgery cancellation-no new prescription sent.     Patient states he has woken up gagging/coughing after anesthesia. Denies any other complications after anesthesia.   See risk scores as previously documented.     POC HEMOGLOBIN A1c   Date Value Ref Range Status   12/05/2023 7.1 (A) 4.2 - 6.5 % Final

## 2024-01-17 ENCOUNTER — ANESTHESIA EVENT (OUTPATIENT)
Dept: OPERATING ROOM | Facility: HOSPITAL | Age: 79
End: 2024-01-17
Payer: MEDICARE

## 2024-01-18 ENCOUNTER — APPOINTMENT (OUTPATIENT)
Dept: PHYSICAL THERAPY | Facility: CLINIC | Age: 79
End: 2024-01-18

## 2024-01-18 LAB — STAPHYLOCOCCUS SPEC CULT: NORMAL

## 2024-01-19 ENCOUNTER — OFFICE VISIT (OUTPATIENT)
Dept: PAIN MEDICINE | Facility: CLINIC | Age: 79
End: 2024-01-19
Payer: MEDICARE

## 2024-01-19 VITALS — SYSTOLIC BLOOD PRESSURE: 146 MMHG | HEART RATE: 50 BPM | RESPIRATION RATE: 20 BRPM | DIASTOLIC BLOOD PRESSURE: 61 MMHG

## 2024-01-19 DIAGNOSIS — M48.062 LUMBAR STENOSIS WITH NEUROGENIC CLAUDICATION: ICD-10-CM

## 2024-01-19 DIAGNOSIS — M96.1 POSTLAMINECTOMY SYNDROME, LUMBAR REGION: Primary | ICD-10-CM

## 2024-01-19 PROCEDURE — 1160F RVW MEDS BY RX/DR IN RCRD: CPT | Performed by: PAIN MEDICINE

## 2024-01-19 PROCEDURE — 1125F AMNT PAIN NOTED PAIN PRSNT: CPT | Performed by: PAIN MEDICINE

## 2024-01-19 PROCEDURE — 99204 OFFICE O/P NEW MOD 45 MIN: CPT | Performed by: PAIN MEDICINE

## 2024-01-19 PROCEDURE — 3077F SYST BP >= 140 MM HG: CPT | Performed by: PAIN MEDICINE

## 2024-01-19 PROCEDURE — 99214 OFFICE O/P EST MOD 30 MIN: CPT | Performed by: PAIN MEDICINE

## 2024-01-19 PROCEDURE — 1159F MED LIST DOCD IN RCRD: CPT | Performed by: PAIN MEDICINE

## 2024-01-19 PROCEDURE — 3078F DIAST BP <80 MM HG: CPT | Performed by: PAIN MEDICINE

## 2024-01-19 PROCEDURE — 1036F TOBACCO NON-USER: CPT | Performed by: PAIN MEDICINE

## 2024-01-19 RX ORDER — GABAPENTIN 100 MG/1
100 CAPSULE ORAL 3 TIMES DAILY
Qty: 90 CAPSULE | Refills: 11 | Status: SHIPPED | OUTPATIENT
Start: 2024-01-19 | End: 2024-02-01 | Stop reason: HOSPADM

## 2024-01-19 ASSESSMENT — PAIN SCALES - GENERAL
PAINLEVEL: 1
PAINLEVEL_OUTOF10: 1

## 2024-01-19 ASSESSMENT — PAIN - FUNCTIONAL ASSESSMENT: PAIN_FUNCTIONAL_ASSESSMENT: 0-10

## 2024-01-19 ASSESSMENT — PAIN DESCRIPTION - DESCRIPTORS: DESCRIPTORS: ACHING

## 2024-01-19 NOTE — H&P
History Of Present Illness  Francis Vang is a 78 y.o. male presenting with chronic  back pain  for management.      Patient has a history of chronic pan for back years that has been  not getting better since his last surgery . The pain is located in his lower back. Patient describes pain as a aching pain that radiates too legs especially his right leg . Pain is generally a 1/10 in intensity. Pain reaches a maximal intensity of 5/10 .   Patient so far did undergo 3 back surgery his last back surgery was performed by Dr. Brewer  roughly 2 years ago patient seems to be scheduled for 1 more surgery coming up in a month he is  currently  in physical therapy without any significant improvement and prior trial of medication did not provide him with any significant benefit  Pain is worsened by: walking     Pain is improved by: sitting     Previous Treatment History     Since patient's pain began he has been evaluated by Neurosurgery.  The patient has participated in structured Physical Therapy currently.     Recent Xray and MRI and CT November 2023           Past Medical History  Past Medical History:   Diagnosis Date    Anemia     Arthritis     Asthma     Cataracts, bilateral     Chronic anticoagulation     CKD (chronic kidney disease) stage 3, GFR 30-59 ml/min (CMS/[facility])     COPD (chronic obstructive pulmonary disease) (CMS/[facility])     Deep vein thrombosis (DVT) of lower extremity (CMS/[facility]) 04/22/2013    left leg    Diabetes mellitus (CMS/[facility])     DVT (deep venous thrombosis) (CMS/[facility])     Encounter for general adult medical examination without abnormal findings 08/13/2020    Encounter for Medicare annual wellness exam    GERD (gastroesophageal reflux disease)     Gout     History of DVT (deep vein thrombosis)     Hyperlipidemia     Hypertension     Neurogenic claudication     OA (osteoarthritis)     RAD (reactive airway disease)     Radiculopathy     Sleep apnea     CPAP    Spinal stenosis        Surgical History  Past  Surgical History:   Procedure Laterality Date    COLONOSCOPY      OTHER SURGICAL HISTORY  05/10/2019    Splenectomy    OTHER SURGICAL HISTORY  05/10/2019    Cholecystectomy    OTHER SURGICAL HISTORY      x3 back surgeries        Social History  He reports that he has never smoked. He has never used smokeless tobacco. He reports that he does not currently use alcohol. He reports that he does not use drugs.    Family History  Family History   Problem Relation Name Age of Onset    Cancer Mother      Hypertension Father      Cancer Sister      Seizures Sister          Allergies  Ragweed pollen    Review of Systems   12 Systems have been reviewed as follows.   Constitutional: Fever, weight gain, weight loss, appetite change, night sweats, fatigue, chills.  Eyes : blurry, double vision, vision, loss, tearing, redness, pain, sensitivity to light, glaucoma.  Ears, nose, mouth, and throat: Hearing loss, ringing in the ears, ear pain, nasal congestion, nasal drainage, nosebleeds, mouth, throat, irritation tooth problem.  Cardiovascular :chest pain, pressure, heart tracing,palpaitations , sweating, leg swelling, high or low blood pressure  Pulmonary: Cough, yellow or green sputum, blood and sputum, shortness of breath, wheezing  Gastrointestinal: Nause, vomiting, diarrhea, constipation, pain, blood in stool, or vomitus, heartburn, difficulty swallowing  Genitourinary: incontinence, abnormal bleeding, abnormal discharge, urinary frequency, urinary hesitancy, pain, impotence sexual problem, infection, urinary retention  Musculoskeletal: Pain, stiffness, joint, redness or warmth, arthritis, back pain, weakness, muscle wasting, sprain or fracture  Neuro: Weight weakness, dizziness, change in voice, change in taste change in vision, change in hearing, loss, or change of sensation, trouble walking, balance problems coordination problems, shaking, speech problem  Endocrine , cold or heat intolerance, blood sugar problem, weight gain  or loss missed periods hot flashes, sweats, change in body hair, change in libido, increased thirst, increased urination  Heme/lymph: Swelling, bleeding, problem anemia, bruising, enlarged lymph nodes  Allergic/immunologic: H. plus nasal drip, watery itchy eyes, nasal drainage, immunosuppressed  The above, were reviewed and noted negative except as noted.    Physical Exam   Vital signs reviewed, documented in chart     General:  Appears well, does not look in any major distress  Alert    HEENT:  Head atraumatic  Eyes normal inspection  PERRL  Normal ENT inspection  No signs of dehydration    NECK:  Normal inspection  Range of motion within normal     RESPIRATORY:  No respiratory distress    CVS:  Heart rate and rhythm regular    ABDOMEN/GI  Soft  Non-tender  No distention  No organomegaly      BACK:  Normal inspection, flexion and extension within normal limit   no tenderness upon the palpation of the facet joint  Si joints none tender to palpations     EXTREMITIES:  Non-Tender  Full ROM  Normal appearance  No Pedal edema  Power symmetrical , sensory examination preserved.    NEURO:  Alert and oriented X 3  CNS normal as tested without focal neurological deficit   Sensation normal  Motor normal  reflexes absent     PSYCH:  Mood normal  Affect normal    SKIN:  Color normal  No rash  Warm  Dry  no sign of skin marking supportive of IV drug usage /abuse.    Last Recorded Vitals  Blood pressure 146/61, pulse 50, resp. rate 20.    Relevant Results        Xray   IMPRESSION:  Severe multilevel spondylosis throughout the lumbar spine without  acute abnormality. Mild retrolisthesis L2 on L3  Calcifications over the right upper quadrant may lie within the  gallbladder. Ultrasound and/or CT is advised for complete evaluation.    MRI  FINDINGS:  5 lumbar-type vertebral bodies are again noted, the last well-formed  disc space is labeled L5-S1.      Alignment: The vertebral alignment is maintained.      Vertebrae/Intervertebral  Discs: The vertebral bodies demonstrate  expected height. Multilevel laminotomy changes are more extensive  than on the prior study. Degenerative endplate changes with  associated marrow edema throughout the lumbar spine, most prominently  at L2-3. Multilevel loss of normal disc T2 signal intensity and disc  height.      Conus: The lower thoracic cord appears unremarkable. The conus  terminates at L1-2. The cauda equina is unremarkable.      T12-L1: Disc bulge, facet hypertrophy, and ligamentum flavum  thickening. No canal or foraminal narrowing.      L1-2: Postoperative changes with an asymmetric disc bulge, facet  hypertrophy, and ligamentum flavum thickening. The previously noted  central spinal canal narrowing as resolved and there is residual mild  narrowing of both subarticular recesses and neural foramina which is  similar to previous.      L2-3: Postoperative changes with a disc bulge, facet hypertrophy, and  ligamentum flavum thickening. A synovial cyst on the left anteriorly  is increased from previous. Mild central spinal canal narrowing and  severe narrowing of both subarticular recesses and neural foramina is  similar to previous.      L3-4: Postoperative changes with a disc bulge, facet hypertrophy, and  ligamentum flavum thickening, mild right and moderate left  subarticular recess narrowing and severe bilateral neural foramen  narrowing similar to previous.      L4-5: Postoperative changes with a disc bulge, facet hypertrophy, and  ligamentum flavum thickening, moderate narrowing of both subarticular  recesses and severe bilateral neural foramen narrowing similar to  previous.      L5-S1: Disc bulge, facet hypertrophy, and ligamentum flavum  thickening with diffuse mild spinal canal narrowing and severe  bilateral neural foramen narrowing, similar to previous.      Postoperative changes with associated granulation tissue,  susceptibility artifact, paraspinal muscular atrophy, no associated  fluid  collection.. Incidental T2 hyperintense renal cysts. A  partially visualized heterogeneous T2 signal intensity perinephric  collection on the right is similar to previous and consistent with a  chronic hematoma.         Assessment/Plan       78 years old with history and physical examination supportive of postlaminectomy syndrome with the bilateral lumbar radiculopathy currently anticoagulated on EXTR alto    Plan  Discussed with the patient the different modalities available for the treatment of his condition I advised the patient that he will benefit from a trial of gabapentin starting at 100 mg at bedtime and titrated slowly to 100 mg 3 times per day I would recommend for the patient a trial of a caudal epidural steroid injection to be performed under fluoroscopic guidance with injection of contrast material to assist him with his postlaminectomy and lumbar radiculopathy pain this injection could only be performed if he can hold the Xarelto for few days to minimize the risk of an epidural hematoma if the patient decided not to proceed with surgery and he failed the treatment plan then he should be considered for a transcutaneous trial of spinal cord stimulation   Patient questions were answered benefits and risk were discussed and patient is in agreement with the plan      The above clinical summary has been dictated with voice recognition software. It has not been proofread for grammatical errors, typographical mistakes, or other semantic inconsistencies.    Thank you for visiting our office today. It was our pleasure to take part in your healthcare.     Please do not hesitate to contact the pain clinic after your visit with any questions or concerns at  M-F 8-4 pm       Lori Francois M.D.  Medical Director , Division of Pain Medicine [facility]   of Anesthesiology and Pain Medicine  [facility]  [facility]  [address]  [address]  [place] [address]     Office: [phone]  Fax: [phone]     Lori Francois MD

## 2024-01-19 NOTE — PROGRESS NOTES
Subjective     Francis Vang is a 78 y.o. year old male patient here for chronic pain management.     Patient has a history of chronic pan for back years that has been  not getting better since his last surgery . The pain is located in his lower back. Patient describes pain as a aching pain that radiates too legs tasha his right leg . Pain is generally a 1/10 in intensity. Pain reaches a maximal intensity of 5/10 .     Pain is worsened by: walking    Pain is improved by: sitting    Previous Treatment History    Since patient's pain began he has been evaluated by Neurosurgery.  The patient has participated in structured Physical Therapy currently.    Recent Xray and MRI and CT November 2023        Review of Systems    Objective   Physical Exam    Assessment/Plan

## 2024-01-19 NOTE — H&P (VIEW-ONLY)
History Of Present Illness  Francis Vang is a 78 y.o. male presenting with chronic  back pain  for management.      Patient has a history of chronic pan for back years that has been  not getting better since his last surgery . The pain is located in his lower back. Patient describes pain as a aching pain that radiates too legs especially his right leg . Pain is generally a 1/10 in intensity. Pain reaches a maximal intensity of 5/10 .   Patient so far did undergo 3 back surgery his last back surgery was performed by Dr. Brewer  roughly 2 years ago patient seems to be scheduled for 1 more surgery coming up in a month he is  currently  in physical therapy without any significant improvement and prior trial of medication did not provide him with any significant benefit  Pain is worsened by: walking     Pain is improved by: sitting     Previous Treatment History     Since patient's pain began he has been evaluated by Neurosurgery.  The patient has participated in structured Physical Therapy currently.     Recent Xray and MRI and CT November 2023           Past Medical History  Past Medical History:   Diagnosis Date    Anemia     Arthritis     Asthma     Cataracts, bilateral     Chronic anticoagulation     CKD (chronic kidney disease) stage 3, GFR 30-59 ml/min (CMS/[facility])     COPD (chronic obstructive pulmonary disease) (CMS/[facility])     Deep vein thrombosis (DVT) of lower extremity (CMS/[facility]) 04/22/2013    left leg    Diabetes mellitus (CMS/[facility])     DVT (deep venous thrombosis) (CMS/[facility])     Encounter for general adult medical examination without abnormal findings 08/13/2020    Encounter for Medicare annual wellness exam    GERD (gastroesophageal reflux disease)     Gout     History of DVT (deep vein thrombosis)     Hyperlipidemia     Hypertension     Neurogenic claudication     OA (osteoarthritis)     RAD (reactive airway disease)     Radiculopathy     Sleep apnea     CPAP    Spinal stenosis        Surgical History  Past  Surgical History:   Procedure Laterality Date    COLONOSCOPY      OTHER SURGICAL HISTORY  05/10/2019    Splenectomy    OTHER SURGICAL HISTORY  05/10/2019    Cholecystectomy    OTHER SURGICAL HISTORY      x3 back surgeries        Social History  He reports that he has never smoked. He has never used smokeless tobacco. He reports that he does not currently use alcohol. He reports that he does not use drugs.    Family History  Family History   Problem Relation Name Age of Onset    Cancer Mother      Hypertension Father      Cancer Sister      Seizures Sister          Allergies  Ragweed pollen    Review of Systems   12 Systems have been reviewed as follows.   Constitutional: Fever, weight gain, weight loss, appetite change, night sweats, fatigue, chills.  Eyes : blurry, double vision, vision, loss, tearing, redness, pain, sensitivity to light, glaucoma.  Ears, nose, mouth, and throat: Hearing loss, ringing in the ears, ear pain, nasal congestion, nasal drainage, nosebleeds, mouth, throat, irritation tooth problem.  Cardiovascular :chest pain, pressure, heart tracing,palpaitations , sweating, leg swelling, high or low blood pressure  Pulmonary: Cough, yellow or green sputum, blood and sputum, shortness of breath, wheezing  Gastrointestinal: Nause, vomiting, diarrhea, constipation, pain, blood in stool, or vomitus, heartburn, difficulty swallowing  Genitourinary: incontinence, abnormal bleeding, abnormal discharge, urinary frequency, urinary hesitancy, pain, impotence sexual problem, infection, urinary retention  Musculoskeletal: Pain, stiffness, joint, redness or warmth, arthritis, back pain, weakness, muscle wasting, sprain or fracture  Neuro: Weight weakness, dizziness, change in voice, change in taste change in vision, change in hearing, loss, or change of sensation, trouble walking, balance problems coordination problems, shaking, speech problem  Endocrine , cold or heat intolerance, blood sugar problem, weight gain  or loss missed periods hot flashes, sweats, change in body hair, change in libido, increased thirst, increased urination  Heme/lymph: Swelling, bleeding, problem anemia, bruising, enlarged lymph nodes  Allergic/immunologic: H. plus nasal drip, watery itchy eyes, nasal drainage, immunosuppressed  The above, were reviewed and noted negative except as noted.    Physical Exam   Vital signs reviewed, documented in chart     General:  Appears well, does not look in any major distress  Alert    HEENT:  Head atraumatic  Eyes normal inspection  PERRL  Normal ENT inspection  No signs of dehydration    NECK:  Normal inspection  Range of motion within normal     RESPIRATORY:  No respiratory distress    CVS:  Heart rate and rhythm regular    ABDOMEN/GI  Soft  Non-tender  No distention  No organomegaly      BACK:  Normal inspection, flexion and extension within normal limit   no tenderness upon the palpation of the facet joint  Si joints none tender to palpations     EXTREMITIES:  Non-Tender  Full ROM  Normal appearance  No Pedal edema  Power symmetrical , sensory examination preserved.    NEURO:  Alert and oriented X 3  CNS normal as tested without focal neurological deficit   Sensation normal  Motor normal  reflexes absent     PSYCH:  Mood normal  Affect normal    SKIN:  Color normal  No rash  Warm  Dry  no sign of skin marking supportive of IV drug usage /abuse.    Last Recorded Vitals  Blood pressure 146/61, pulse 50, resp. rate 20.    Relevant Results        Xray   IMPRESSION:  Severe multilevel spondylosis throughout the lumbar spine without  acute abnormality. Mild retrolisthesis L2 on L3  Calcifications over the right upper quadrant may lie within the  gallbladder. Ultrasound and/or CT is advised for complete evaluation.    MRI  FINDINGS:  5 lumbar-type vertebral bodies are again noted, the last well-formed  disc space is labeled L5-S1.      Alignment: The vertebral alignment is maintained.      Vertebrae/Intervertebral  Discs: The vertebral bodies demonstrate  expected height. Multilevel laminotomy changes are more extensive  than on the prior study. Degenerative endplate changes with  associated marrow edema throughout the lumbar spine, most prominently  at L2-3. Multilevel loss of normal disc T2 signal intensity and disc  height.      Conus: The lower thoracic cord appears unremarkable. The conus  terminates at L1-2. The cauda equina is unremarkable.      T12-L1: Disc bulge, facet hypertrophy, and ligamentum flavum  thickening. No canal or foraminal narrowing.      L1-2: Postoperative changes with an asymmetric disc bulge, facet  hypertrophy, and ligamentum flavum thickening. The previously noted  central spinal canal narrowing as resolved and there is residual mild  narrowing of both subarticular recesses and neural foramina which is  similar to previous.      L2-3: Postoperative changes with a disc bulge, facet hypertrophy, and  ligamentum flavum thickening. A synovial cyst on the left anteriorly  is increased from previous. Mild central spinal canal narrowing and  severe narrowing of both subarticular recesses and neural foramina is  similar to previous.      L3-4: Postoperative changes with a disc bulge, facet hypertrophy, and  ligamentum flavum thickening, mild right and moderate left  subarticular recess narrowing and severe bilateral neural foramen  narrowing similar to previous.      L4-5: Postoperative changes with a disc bulge, facet hypertrophy, and  ligamentum flavum thickening, moderate narrowing of both subarticular  recesses and severe bilateral neural foramen narrowing similar to  previous.      L5-S1: Disc bulge, facet hypertrophy, and ligamentum flavum  thickening with diffuse mild spinal canal narrowing and severe  bilateral neural foramen narrowing, similar to previous.      Postoperative changes with associated granulation tissue,  susceptibility artifact, paraspinal muscular atrophy, no associated  fluid  collection.. Incidental T2 hyperintense renal cysts. A  partially visualized heterogeneous T2 signal intensity perinephric  collection on the right is similar to previous and consistent with a  chronic hematoma.         Assessment/Plan       78 years old with history and physical examination supportive of postlaminectomy syndrome with the bilateral lumbar radiculopathy currently anticoagulated on EXTR alto    Plan  Discussed with the patient the different modalities available for the treatment of his condition I advised the patient that he will benefit from a trial of gabapentin starting at 100 mg at bedtime and titrated slowly to 100 mg 3 times per day I would recommend for the patient a trial of a caudal epidural steroid injection to be performed under fluoroscopic guidance with injection of contrast material to assist him with his postlaminectomy and lumbar radiculopathy pain this injection could only be performed if he can hold the Xarelto for few days to minimize the risk of an epidural hematoma if the patient decided not to proceed with surgery and he failed the treatment plan then he should be considered for a transcutaneous trial of spinal cord stimulation   Patient questions were answered benefits and risk were discussed and patient is in agreement with the plan      The above clinical summary has been dictated with voice recognition software. It has not been proofread for grammatical errors, typographical mistakes, or other semantic inconsistencies.    Thank you for visiting our office today. It was our pleasure to take part in your healthcare.     Please do not hesitate to contact the pain clinic after your visit with any questions or concerns at  M-F 8-4 pm       Lori Francois M.D.  Medical Director , Division of Pain Medicine [facility]   of Anesthesiology and Pain Medicine  [facility]  [facility]  [address]  [address]  [place] [address]     Office: [phone]  Fax: [phone]     Lori Francois MD

## 2024-01-24 ENCOUNTER — ANESTHESIA (OUTPATIENT)
Dept: OPERATING ROOM | Facility: HOSPITAL | Age: 79
End: 2024-01-24
Payer: MEDICARE

## 2024-01-24 ENCOUNTER — HOSPITAL ENCOUNTER (OUTPATIENT)
Facility: HOSPITAL | Age: 79
Setting detail: SURGERY ADMIT
Discharge: HOME | End: 2024-01-24
Attending: STUDENT IN AN ORGANIZED HEALTH CARE EDUCATION/TRAINING PROGRAM | Admitting: STUDENT IN AN ORGANIZED HEALTH CARE EDUCATION/TRAINING PROGRAM
Payer: MEDICARE

## 2024-01-24 VITALS
HEIGHT: 73 IN | HEART RATE: 54 BPM | DIASTOLIC BLOOD PRESSURE: 67 MMHG | OXYGEN SATURATION: 94 % | RESPIRATION RATE: 18 BRPM | SYSTOLIC BLOOD PRESSURE: 149 MMHG | WEIGHT: 215 LBS | TEMPERATURE: 97.2 F | BODY MASS INDEX: 28.49 KG/M2

## 2024-01-24 DIAGNOSIS — M48.062 LUMBAR STENOSIS WITH NEUROGENIC CLAUDICATION: ICD-10-CM

## 2024-01-24 LAB — GLUCOSE BLD MANUAL STRIP-MCNC: 177 MG/DL (ref 74–99)

## 2024-01-24 PROCEDURE — 82947 ASSAY GLUCOSE BLOOD QUANT: CPT

## 2024-01-24 ASSESSMENT — PAIN SCALES - GENERAL: PAINLEVEL_OUTOF10: 0 - NO PAIN

## 2024-01-24 ASSESSMENT — PAIN - FUNCTIONAL ASSESSMENT: PAIN_FUNCTIONAL_ASSESSMENT: 0-10

## 2024-01-31 ENCOUNTER — ANESTHESIA EVENT (OUTPATIENT)
Dept: OPERATING ROOM | Facility: HOSPITAL | Age: 79
End: 2024-01-31
Payer: MEDICARE

## 2024-01-31 ENCOUNTER — APPOINTMENT (OUTPATIENT)
Dept: RADIOLOGY | Facility: HOSPITAL | Age: 79
End: 2024-01-31
Payer: MEDICARE

## 2024-01-31 ENCOUNTER — HOSPITAL ENCOUNTER (OUTPATIENT)
Facility: HOSPITAL | Age: 79
Discharge: HOME | End: 2024-02-01
Attending: STUDENT IN AN ORGANIZED HEALTH CARE EDUCATION/TRAINING PROGRAM | Admitting: STUDENT IN AN ORGANIZED HEALTH CARE EDUCATION/TRAINING PROGRAM
Payer: MEDICARE

## 2024-01-31 ENCOUNTER — ANESTHESIA (OUTPATIENT)
Dept: OPERATING ROOM | Facility: HOSPITAL | Age: 79
End: 2024-01-31
Payer: MEDICARE

## 2024-01-31 DIAGNOSIS — Z98.890 H/O LUMBOSACRAL SPINE SURGERY: ICD-10-CM

## 2024-01-31 DIAGNOSIS — G89.18 ACUTE POSTOPERATIVE PAIN: ICD-10-CM

## 2024-01-31 DIAGNOSIS — M54.16 LUMBAR RADICULOPATHY: ICD-10-CM

## 2024-01-31 DIAGNOSIS — Z96.1 PSEUDOPHAKIA, BOTH EYES: ICD-10-CM

## 2024-01-31 DIAGNOSIS — R29.898 WEAKNESS OF LEFT LEG: ICD-10-CM

## 2024-01-31 DIAGNOSIS — M48.062 LUMBAR STENOSIS WITH NEUROGENIC CLAUDICATION: Primary | ICD-10-CM

## 2024-01-31 LAB
GLUCOSE BLD MANUAL STRIP-MCNC: 142 MG/DL (ref 74–99)
GLUCOSE BLD MANUAL STRIP-MCNC: 147 MG/DL (ref 74–99)

## 2024-01-31 PROCEDURE — 3700000001 HC GENERAL ANESTHESIA TIME - INITIAL BASE CHARGE: Performed by: STUDENT IN AN ORGANIZED HEALTH CARE EDUCATION/TRAINING PROGRAM

## 2024-01-31 PROCEDURE — 2780000003 HC OR 278 NO HCPCS: Performed by: STUDENT IN AN ORGANIZED HEALTH CARE EDUCATION/TRAINING PROGRAM

## 2024-01-31 PROCEDURE — A63047 PR LAMINEC/FACETECT/FORAMIN,LUMBAR 1 SEG: Performed by: ANESTHESIOLOGY

## 2024-01-31 PROCEDURE — 99100 ANES PT EXTEME AGE<1 YR&>70: CPT | Performed by: ANESTHESIOLOGY

## 2024-01-31 PROCEDURE — 22842 INSERT SPINE FIXATION DEVICE: CPT | Performed by: STUDENT IN AN ORGANIZED HEALTH CARE EDUCATION/TRAINING PROGRAM

## 2024-01-31 PROCEDURE — 61783 SCAN PROC SPINAL: CPT | Performed by: STUDENT IN AN ORGANIZED HEALTH CARE EDUCATION/TRAINING PROGRAM

## 2024-01-31 PROCEDURE — 3600000009 HC OR TIME - EACH INCREMENTAL 1 MINUTE - PROCEDURE LEVEL FOUR: Performed by: STUDENT IN AN ORGANIZED HEALTH CARE EDUCATION/TRAINING PROGRAM

## 2024-01-31 PROCEDURE — 2500000005 HC RX 250 GENERAL PHARMACY W/O HCPCS: Performed by: ANESTHESIOLOGIST ASSISTANT

## 2024-01-31 PROCEDURE — A63047 PR LAMINEC/FACETECT/FORAMIN,LUMBAR 1 SEG: Performed by: ANESTHESIOLOGIST ASSISTANT

## 2024-01-31 PROCEDURE — 2500000005 HC RX 250 GENERAL PHARMACY W/O HCPCS: Performed by: ANESTHESIOLOGY

## 2024-01-31 PROCEDURE — 3600000004 HC OR TIME - INITIAL BASE CHARGE - PROCEDURE LEVEL FOUR: Performed by: STUDENT IN AN ORGANIZED HEALTH CARE EDUCATION/TRAINING PROGRAM

## 2024-01-31 PROCEDURE — 20936 SP BONE AGRFT LOCAL ADD-ON: CPT | Performed by: STUDENT IN AN ORGANIZED HEALTH CARE EDUCATION/TRAINING PROGRAM

## 2024-01-31 PROCEDURE — 2500000004 HC RX 250 GENERAL PHARMACY W/ HCPCS (ALT 636 FOR OP/ED): Mod: MUE | Performed by: STUDENT IN AN ORGANIZED HEALTH CARE EDUCATION/TRAINING PROGRAM

## 2024-01-31 PROCEDURE — 7100000001 HC RECOVERY ROOM TIME - INITIAL BASE CHARGE: Performed by: STUDENT IN AN ORGANIZED HEALTH CARE EDUCATION/TRAINING PROGRAM

## 2024-01-31 PROCEDURE — 7100000002 HC RECOVERY ROOM TIME - EACH INCREMENTAL 1 MINUTE: Performed by: STUDENT IN AN ORGANIZED HEALTH CARE EDUCATION/TRAINING PROGRAM

## 2024-01-31 PROCEDURE — 2500000004 HC RX 250 GENERAL PHARMACY W/ HCPCS (ALT 636 FOR OP/ED): Performed by: ANESTHESIOLOGIST ASSISTANT

## 2024-01-31 PROCEDURE — 3700000002 HC GENERAL ANESTHESIA TIME - EACH INCREMENTAL 1 MINUTE: Performed by: STUDENT IN AN ORGANIZED HEALTH CARE EDUCATION/TRAINING PROGRAM

## 2024-01-31 PROCEDURE — 22614 ARTHRD PST TQ 1NTRSPC EA ADD: CPT | Performed by: STUDENT IN AN ORGANIZED HEALTH CARE EDUCATION/TRAINING PROGRAM

## 2024-01-31 PROCEDURE — 63048 LAM FACETEC &FORAMOT EA ADDL: CPT | Performed by: STUDENT IN AN ORGANIZED HEALTH CARE EDUCATION/TRAINING PROGRAM

## 2024-01-31 PROCEDURE — A4217 STERILE WATER/SALINE, 500 ML: HCPCS | Performed by: STUDENT IN AN ORGANIZED HEALTH CARE EDUCATION/TRAINING PROGRAM

## 2024-01-31 PROCEDURE — 22612 ARTHRD PST TQ 1NTRSPC LUMBAR: CPT | Performed by: STUDENT IN AN ORGANIZED HEALTH CARE EDUCATION/TRAINING PROGRAM

## 2024-01-31 PROCEDURE — 7100000011 HC EXTENDED STAY RECOVERY HOURLY - NURSING UNIT

## 2024-01-31 PROCEDURE — C1713 ANCHOR/SCREW BN/BN,TIS/BN: HCPCS | Performed by: STUDENT IN AN ORGANIZED HEALTH CARE EDUCATION/TRAINING PROGRAM

## 2024-01-31 PROCEDURE — 2500000004 HC RX 250 GENERAL PHARMACY W/ HCPCS (ALT 636 FOR OP/ED): Performed by: ANESTHESIOLOGY

## 2024-01-31 PROCEDURE — 82947 ASSAY GLUCOSE BLOOD QUANT: CPT | Mod: 59

## 2024-01-31 PROCEDURE — 87086 URINE CULTURE/COLONY COUNT: CPT | Mod: STJLAB | Performed by: STUDENT IN AN ORGANIZED HEALTH CARE EDUCATION/TRAINING PROGRAM

## 2024-01-31 PROCEDURE — 2500000005 HC RX 250 GENERAL PHARMACY W/O HCPCS: Performed by: STUDENT IN AN ORGANIZED HEALTH CARE EDUCATION/TRAINING PROGRAM

## 2024-01-31 PROCEDURE — 2500000004 HC RX 250 GENERAL PHARMACY W/ HCPCS (ALT 636 FOR OP/ED): Performed by: STUDENT IN AN ORGANIZED HEALTH CARE EDUCATION/TRAINING PROGRAM

## 2024-01-31 PROCEDURE — 63047 LAM FACETEC & FORAMOT LUMBAR: CPT | Performed by: STUDENT IN AN ORGANIZED HEALTH CARE EDUCATION/TRAINING PROGRAM

## 2024-01-31 PROCEDURE — 2500000001 HC RX 250 WO HCPCS SELF ADMINISTERED DRUGS (ALT 637 FOR MEDICARE OP): Performed by: STUDENT IN AN ORGANIZED HEALTH CARE EDUCATION/TRAINING PROGRAM

## 2024-01-31 PROCEDURE — 2720000007 HC OR 272 NO HCPCS: Performed by: STUDENT IN AN ORGANIZED HEALTH CARE EDUCATION/TRAINING PROGRAM

## 2024-01-31 DEVICE — ROD, RELINE-O, 5.5 X 70MM, LORDOTIC: Type: IMPLANTABLE DEVICE | Site: BACK | Status: FUNCTIONAL

## 2024-01-31 DEVICE — SCAFFOLD, STRIP, ATTRAX, 50 X 25 X 6MM, 15CC: Type: IMPLANTABLE DEVICE | Site: SPINE LUMBAR | Status: FUNCTIONAL

## 2024-01-31 DEVICE — RELINE LOCK SCREW, 5.5MM OPEN TULIP
Type: IMPLANTABLE DEVICE | Site: SPINE LUMBAR | Status: FUNCTIONAL
Brand: RELINE

## 2024-01-31 DEVICE — RELINE-O SCREW, 7.5X55MM 2S POLYAXIAL
Type: IMPLANTABLE DEVICE | Site: SPINE LUMBAR | Status: FUNCTIONAL
Brand: RELINE

## 2024-01-31 RX ORDER — AMOXICILLIN 250 MG
2 CAPSULE ORAL 2 TIMES DAILY
Status: DISCONTINUED | OUTPATIENT
Start: 2024-01-31 | End: 2024-02-01 | Stop reason: HOSPADM

## 2024-01-31 RX ORDER — ALLOPURINOL 300 MG/1
300 TABLET ORAL DAILY
Status: DISCONTINUED | OUTPATIENT
Start: 2024-01-31 | End: 2024-02-01 | Stop reason: HOSPADM

## 2024-01-31 RX ORDER — LISINOPRIL 5 MG/1
5 TABLET ORAL DAILY
Status: DISCONTINUED | OUTPATIENT
Start: 2024-02-01 | End: 2024-02-01 | Stop reason: HOSPADM

## 2024-01-31 RX ORDER — ATORVASTATIN CALCIUM 10 MG/1
10 TABLET, FILM COATED ORAL DAILY
Status: DISCONTINUED | OUTPATIENT
Start: 2024-01-31 | End: 2024-02-01 | Stop reason: HOSPADM

## 2024-01-31 RX ORDER — LIDOCAINE HYDROCHLORIDE AND EPINEPHRINE 10; 10 MG/ML; UG/ML
INJECTION, SOLUTION INFILTRATION; PERINEURAL AS NEEDED
Status: DISCONTINUED | OUTPATIENT
Start: 2024-01-31 | End: 2024-01-31 | Stop reason: HOSPADM

## 2024-01-31 RX ORDER — DEXTROSE 50 % IN WATER (D50W) INTRAVENOUS SYRINGE
25
Status: DISCONTINUED | OUTPATIENT
Start: 2024-01-31 | End: 2024-02-01 | Stop reason: HOSPADM

## 2024-01-31 RX ORDER — HYDROMORPHONE HYDROCHLORIDE 1 MG/ML
0.5 INJECTION, SOLUTION INTRAMUSCULAR; INTRAVENOUS; SUBCUTANEOUS EVERY 5 MIN PRN
Status: DISCONTINUED | OUTPATIENT
Start: 2024-01-31 | End: 2024-01-31 | Stop reason: HOSPADM

## 2024-01-31 RX ORDER — ALBUTEROL SULFATE 0.83 MG/ML
2.5 SOLUTION RESPIRATORY (INHALATION)
Status: DISCONTINUED | OUTPATIENT
Start: 2024-01-31 | End: 2024-01-31 | Stop reason: HOSPADM

## 2024-01-31 RX ORDER — HYDROMORPHONE HYDROCHLORIDE 1 MG/ML
1 INJECTION, SOLUTION INTRAMUSCULAR; INTRAVENOUS; SUBCUTANEOUS EVERY 5 MIN PRN
Status: DISCONTINUED | OUTPATIENT
Start: 2024-01-31 | End: 2024-01-31 | Stop reason: HOSPADM

## 2024-01-31 RX ORDER — ONDANSETRON HYDROCHLORIDE 2 MG/ML
INJECTION, SOLUTION INTRAVENOUS AS NEEDED
Status: DISCONTINUED | OUTPATIENT
Start: 2024-01-31 | End: 2024-01-31

## 2024-01-31 RX ORDER — CYCLOBENZAPRINE HCL 10 MG
10 TABLET ORAL 3 TIMES DAILY PRN
Status: DISCONTINUED | OUTPATIENT
Start: 2024-01-31 | End: 2024-02-01 | Stop reason: HOSPADM

## 2024-01-31 RX ORDER — SODIUM CHLORIDE, SODIUM LACTATE, POTASSIUM CHLORIDE, CALCIUM CHLORIDE 600; 310; 30; 20 MG/100ML; MG/100ML; MG/100ML; MG/100ML
INJECTION, SOLUTION INTRAVENOUS CONTINUOUS PRN
Status: DISCONTINUED | OUTPATIENT
Start: 2024-01-31 | End: 2024-01-31

## 2024-01-31 RX ORDER — POLYETHYLENE GLYCOL 3350 17 G/17G
17 POWDER, FOR SOLUTION ORAL DAILY
Status: DISCONTINUED | OUTPATIENT
Start: 2024-01-31 | End: 2024-02-01 | Stop reason: HOSPADM

## 2024-01-31 RX ORDER — SODIUM CHLORIDE 9 MG/ML
100 INJECTION, SOLUTION INTRAVENOUS CONTINUOUS
Status: ACTIVE | OUTPATIENT
Start: 2024-01-31 | End: 2024-02-01

## 2024-01-31 RX ORDER — DIPHENHYDRAMINE HYDROCHLORIDE 50 MG/ML
12.5 INJECTION INTRAMUSCULAR; INTRAVENOUS ONCE AS NEEDED
Status: DISCONTINUED | OUTPATIENT
Start: 2024-01-31 | End: 2024-01-31 | Stop reason: HOSPADM

## 2024-01-31 RX ORDER — OXYBUTYNIN CHLORIDE 5 MG/1
5 TABLET ORAL 2 TIMES DAILY
Status: DISCONTINUED | OUTPATIENT
Start: 2024-01-31 | End: 2024-02-01 | Stop reason: HOSPADM

## 2024-01-31 RX ORDER — MIDAZOLAM HYDROCHLORIDE 1 MG/ML
1 INJECTION, SOLUTION INTRAMUSCULAR; INTRAVENOUS ONCE AS NEEDED
Status: DISCONTINUED | OUTPATIENT
Start: 2024-01-31 | End: 2024-01-31 | Stop reason: HOSPADM

## 2024-01-31 RX ORDER — SOTALOL HYDROCHLORIDE 120 MG/1
120 TABLET ORAL 2 TIMES DAILY
Status: DISCONTINUED | OUTPATIENT
Start: 2024-01-31 | End: 2024-02-01 | Stop reason: HOSPADM

## 2024-01-31 RX ORDER — ONDANSETRON 4 MG/1
4 TABLET, ORALLY DISINTEGRATING ORAL EVERY 8 HOURS PRN
Status: DISCONTINUED | OUTPATIENT
Start: 2024-01-31 | End: 2024-02-01 | Stop reason: HOSPADM

## 2024-01-31 RX ORDER — SODIUM CHLORIDE 0.9 G/100ML
IRRIGANT IRRIGATION AS NEEDED
Status: DISCONTINUED | OUTPATIENT
Start: 2024-01-31 | End: 2024-01-31 | Stop reason: HOSPADM

## 2024-01-31 RX ORDER — VANCOMYCIN HYDROCHLORIDE 1 G/20ML
INJECTION, POWDER, LYOPHILIZED, FOR SOLUTION INTRAVENOUS AS NEEDED
Status: DISCONTINUED | OUTPATIENT
Start: 2024-01-31 | End: 2024-01-31 | Stop reason: HOSPADM

## 2024-01-31 RX ORDER — METOCLOPRAMIDE HYDROCHLORIDE 5 MG/ML
10 INJECTION INTRAMUSCULAR; INTRAVENOUS ONCE AS NEEDED
Status: COMPLETED | OUTPATIENT
Start: 2024-01-31 | End: 2024-01-31

## 2024-01-31 RX ORDER — HYDROMORPHONE HYDROCHLORIDE 1 MG/ML
INJECTION, SOLUTION INTRAMUSCULAR; INTRAVENOUS; SUBCUTANEOUS AS NEEDED
Status: DISCONTINUED | OUTPATIENT
Start: 2024-01-31 | End: 2024-01-31

## 2024-01-31 RX ORDER — GLYCOPYRROLATE 0.2 MG/ML
INJECTION INTRAMUSCULAR; INTRAVENOUS AS NEEDED
Status: DISCONTINUED | OUTPATIENT
Start: 2024-01-31 | End: 2024-01-31

## 2024-01-31 RX ORDER — ROCURONIUM BROMIDE 10 MG/ML
INJECTION, SOLUTION INTRAVENOUS AS NEEDED
Status: DISCONTINUED | OUTPATIENT
Start: 2024-01-31 | End: 2024-01-31

## 2024-01-31 RX ORDER — NORETHINDRONE AND ETHINYL ESTRADIOL 0.5-0.035
KIT ORAL AS NEEDED
Status: DISCONTINUED | OUTPATIENT
Start: 2024-01-31 | End: 2024-01-31

## 2024-01-31 RX ORDER — ONDANSETRON HYDROCHLORIDE 2 MG/ML
4 INJECTION, SOLUTION INTRAVENOUS EVERY 8 HOURS PRN
Status: DISCONTINUED | OUTPATIENT
Start: 2024-01-31 | End: 2024-02-01 | Stop reason: HOSPADM

## 2024-01-31 RX ORDER — FINASTERIDE 5 MG/1
5 TABLET, FILM COATED ORAL DAILY
Status: DISCONTINUED | OUTPATIENT
Start: 2024-01-31 | End: 2024-02-01 | Stop reason: HOSPADM

## 2024-01-31 RX ORDER — BUPIVACAINE HYDROCHLORIDE 2.5 MG/ML
INJECTION, SOLUTION INFILTRATION; PERINEURAL AS NEEDED
Status: DISCONTINUED | OUTPATIENT
Start: 2024-01-31 | End: 2024-01-31 | Stop reason: HOSPADM

## 2024-01-31 RX ORDER — CEFAZOLIN SODIUM 2 G/100ML
INJECTION, SOLUTION INTRAVENOUS AS NEEDED
Status: DISCONTINUED | OUTPATIENT
Start: 2024-01-31 | End: 2024-01-31

## 2024-01-31 RX ORDER — LIDOCAINE HYDROCHLORIDE 20 MG/ML
INJECTION, SOLUTION INFILTRATION; PERINEURAL AS NEEDED
Status: DISCONTINUED | OUTPATIENT
Start: 2024-01-31 | End: 2024-01-31

## 2024-01-31 RX ORDER — NALOXONE HYDROCHLORIDE 0.4 MG/ML
0.2 INJECTION, SOLUTION INTRAMUSCULAR; INTRAVENOUS; SUBCUTANEOUS EVERY 5 MIN PRN
Status: DISCONTINUED | OUTPATIENT
Start: 2024-01-31 | End: 2024-02-01 | Stop reason: HOSPADM

## 2024-01-31 RX ORDER — PROMETHAZINE HYDROCHLORIDE 25 MG/1
25 SUPPOSITORY RECTAL EVERY 12 HOURS PRN
Status: DISCONTINUED | OUTPATIENT
Start: 2024-01-31 | End: 2024-02-01 | Stop reason: HOSPADM

## 2024-01-31 RX ORDER — HYDROCODONE BITARTRATE AND ACETAMINOPHEN 5; 325 MG/1; MG/1
1 TABLET ORAL EVERY 4 HOURS PRN
Status: DISCONTINUED | OUTPATIENT
Start: 2024-01-31 | End: 2024-01-31 | Stop reason: HOSPADM

## 2024-01-31 RX ORDER — DEXTROSE MONOHYDRATE 100 MG/ML
0.3 INJECTION, SOLUTION INTRAVENOUS ONCE AS NEEDED
Status: DISCONTINUED | OUTPATIENT
Start: 2024-01-31 | End: 2024-02-01 | Stop reason: HOSPADM

## 2024-01-31 RX ORDER — HYDRALAZINE HYDROCHLORIDE 20 MG/ML
5 INJECTION INTRAMUSCULAR; INTRAVENOUS EVERY 30 MIN PRN
Status: DISCONTINUED | OUTPATIENT
Start: 2024-01-31 | End: 2024-01-31 | Stop reason: HOSPADM

## 2024-01-31 RX ORDER — DEXAMETHASONE SODIUM PHOSPHATE 4 MG/ML
INJECTION, SOLUTION INTRA-ARTICULAR; INTRALESIONAL; INTRAMUSCULAR; INTRAVENOUS; SOFT TISSUE AS NEEDED
Status: DISCONTINUED | OUTPATIENT
Start: 2024-01-31 | End: 2024-01-31

## 2024-01-31 RX ORDER — LABETALOL HYDROCHLORIDE 5 MG/ML
5 INJECTION, SOLUTION INTRAVENOUS
Status: DISCONTINUED | OUTPATIENT
Start: 2024-01-31 | End: 2024-01-31 | Stop reason: HOSPADM

## 2024-01-31 RX ORDER — OXYCODONE HYDROCHLORIDE 5 MG/1
2.5 TABLET ORAL EVERY 4 HOURS PRN
Status: DISCONTINUED | OUTPATIENT
Start: 2024-01-31 | End: 2024-02-01 | Stop reason: HOSPADM

## 2024-01-31 RX ORDER — OXYCODONE HYDROCHLORIDE 10 MG/1
10 TABLET ORAL EVERY 4 HOURS PRN
Status: DISCONTINUED | OUTPATIENT
Start: 2024-01-31 | End: 2024-02-01 | Stop reason: HOSPADM

## 2024-01-31 RX ORDER — PROMETHAZINE HYDROCHLORIDE 25 MG/1
25 TABLET ORAL EVERY 6 HOURS PRN
Status: DISCONTINUED | OUTPATIENT
Start: 2024-01-31 | End: 2024-02-01 | Stop reason: HOSPADM

## 2024-01-31 RX ORDER — PROPOFOL 10 MG/ML
INJECTION, EMULSION INTRAVENOUS AS NEEDED
Status: DISCONTINUED | OUTPATIENT
Start: 2024-01-31 | End: 2024-01-31

## 2024-01-31 RX ORDER — SODIUM CHLORIDE, SODIUM LACTATE, POTASSIUM CHLORIDE, CALCIUM CHLORIDE 600; 310; 30; 20 MG/100ML; MG/100ML; MG/100ML; MG/100ML
100 INJECTION, SOLUTION INTRAVENOUS CONTINUOUS
Status: DISCONTINUED | OUTPATIENT
Start: 2024-01-31 | End: 2024-01-31 | Stop reason: HOSPADM

## 2024-01-31 RX ORDER — ACETAMINOPHEN 325 MG/1
650 TABLET ORAL EVERY 6 HOURS
Status: DISCONTINUED | OUTPATIENT
Start: 2024-01-31 | End: 2024-02-01 | Stop reason: HOSPADM

## 2024-01-31 RX ORDER — BACITRACIN 500 [USP'U]/G
OINTMENT TOPICAL AS NEEDED
Status: DISCONTINUED | OUTPATIENT
Start: 2024-01-31 | End: 2024-01-31 | Stop reason: HOSPADM

## 2024-01-31 RX ORDER — HYDROCHLOROTHIAZIDE 25 MG/1
25 TABLET ORAL DAILY
Status: DISCONTINUED | OUTPATIENT
Start: 2024-01-31 | End: 2024-02-01 | Stop reason: HOSPADM

## 2024-01-31 RX ORDER — OXYCODONE HYDROCHLORIDE 5 MG/1
5 TABLET ORAL EVERY 4 HOURS PRN
Status: DISCONTINUED | OUTPATIENT
Start: 2024-01-31 | End: 2024-02-01 | Stop reason: HOSPADM

## 2024-01-31 RX ORDER — MONTELUKAST SODIUM 10 MG/1
10 TABLET ORAL NIGHTLY
Status: DISCONTINUED | OUTPATIENT
Start: 2024-01-31 | End: 2024-02-01 | Stop reason: HOSPADM

## 2024-01-31 RX ORDER — HEPARIN SODIUM 5000 [USP'U]/ML
5000 INJECTION, SOLUTION INTRAVENOUS; SUBCUTANEOUS EVERY 8 HOURS
Status: DISCONTINUED | OUTPATIENT
Start: 2024-02-01 | End: 2024-02-01 | Stop reason: HOSPADM

## 2024-01-31 RX ORDER — NIFEDIPINE 60 MG/1
60 TABLET, FILM COATED, EXTENDED RELEASE ORAL DAILY
Status: DISCONTINUED | OUTPATIENT
Start: 2024-01-31 | End: 2024-02-01 | Stop reason: HOSPADM

## 2024-01-31 RX ORDER — INSULIN LISPRO 100 [IU]/ML
0-10 INJECTION, SOLUTION INTRAVENOUS; SUBCUTANEOUS
Status: DISCONTINUED | OUTPATIENT
Start: 2024-01-31 | End: 2024-02-01 | Stop reason: HOSPADM

## 2024-01-31 RX ADMIN — HYDROMORPHONE HYDROCHLORIDE 0.5 MG: 1 INJECTION, SOLUTION INTRAMUSCULAR; INTRAVENOUS; SUBCUTANEOUS at 14:28

## 2024-01-31 RX ADMIN — HYDROCHLOROTHIAZIDE 25 MG: 25 TABLET ORAL at 17:16

## 2024-01-31 RX ADMIN — ACETAMINOPHEN 650 MG: 325 TABLET ORAL at 17:16

## 2024-01-31 RX ADMIN — SODIUM CHLORIDE, POTASSIUM CHLORIDE, SODIUM LACTATE AND CALCIUM CHLORIDE: 600; 310; 30; 20 INJECTION, SOLUTION INTRAVENOUS at 09:51

## 2024-01-31 RX ADMIN — EPHEDRINE SULFATE 10 MG: 50 INJECTION, SOLUTION INTRAVENOUS at 11:40

## 2024-01-31 RX ADMIN — Medication 8 L/MIN: at 13:41

## 2024-01-31 RX ADMIN — ONDANSETRON 4 MG: 2 INJECTION, SOLUTION INTRAMUSCULAR; INTRAVENOUS at 13:07

## 2024-01-31 RX ADMIN — HEPARIN SODIUM 5000 UNITS: 5000 INJECTION INTRAVENOUS; SUBCUTANEOUS at 23:51

## 2024-01-31 RX ADMIN — EPHEDRINE SULFATE 10 MG: 50 INJECTION, SOLUTION INTRAVENOUS at 12:55

## 2024-01-31 RX ADMIN — MONTELUKAST 10 MG: 10 TABLET, FILM COATED ORAL at 21:23

## 2024-01-31 RX ADMIN — SODIUM CHLORIDE 100 ML/HR: 9 INJECTION, SOLUTION INTRAVENOUS at 17:16

## 2024-01-31 RX ADMIN — ROCURONIUM BROMIDE 50 MG: 10 INJECTION INTRAVENOUS at 09:58

## 2024-01-31 RX ADMIN — EPHEDRINE SULFATE 5 MG: 50 INJECTION, SOLUTION INTRAVENOUS at 09:55

## 2024-01-31 RX ADMIN — ROCURONIUM BROMIDE 50 MG: 10 INJECTION INTRAVENOUS at 10:46

## 2024-01-31 RX ADMIN — OXYBUTYNIN CHLORIDE 5 MG: 5 TABLET ORAL at 21:22

## 2024-01-31 RX ADMIN — METOCLOPRAMIDE 10 MG: 5 INJECTION, SOLUTION INTRAMUSCULAR; INTRAVENOUS at 14:56

## 2024-01-31 RX ADMIN — ACETAMINOPHEN 650 MG: 325 TABLET ORAL at 21:23

## 2024-01-31 RX ADMIN — Medication 2 L/MIN: at 14:15

## 2024-01-31 RX ADMIN — EPHEDRINE SULFATE 5 MG: 50 INJECTION, SOLUTION INTRAVENOUS at 10:40

## 2024-01-31 RX ADMIN — EPHEDRINE SULFATE 10 MG: 50 INJECTION, SOLUTION INTRAVENOUS at 11:26

## 2024-01-31 RX ADMIN — SENNOSIDES AND DOCUSATE SODIUM 2 TABLET: 8.6; 5 TABLET ORAL at 21:23

## 2024-01-31 RX ADMIN — ATORVASTATIN CALCIUM 10 MG: 10 TABLET, FILM COATED ORAL at 21:22

## 2024-01-31 RX ADMIN — HYDROMORPHONE HYDROCHLORIDE 0.25 MG: 1 INJECTION, SOLUTION INTRAMUSCULAR; INTRAVENOUS; SUBCUTANEOUS at 10:38

## 2024-01-31 RX ADMIN — SUGAMMADEX 200 MG: 100 INJECTION, SOLUTION INTRAVENOUS at 13:22

## 2024-01-31 RX ADMIN — PROPOFOL 200 MG: 10 INJECTION, EMULSION INTRAVENOUS at 09:57

## 2024-01-31 RX ADMIN — DEXAMETHASONE SODIUM PHOSPHATE 4 MG: 4 INJECTION INTRA-ARTICULAR; INTRALESIONAL; INTRAMUSCULAR; INTRAVENOUS; SOFT TISSUE at 10:06

## 2024-01-31 RX ADMIN — EPHEDRINE SULFATE 10 MG: 50 INJECTION, SOLUTION INTRAVENOUS at 12:04

## 2024-01-31 RX ADMIN — CEFAZOLIN SODIUM 2 G: 2 INJECTION, SOLUTION INTRAVENOUS at 10:19

## 2024-01-31 RX ADMIN — LIDOCAINE HYDROCHLORIDE 100 MG: 20 INJECTION, SOLUTION INFILTRATION; PERINEURAL at 09:55

## 2024-01-31 RX ADMIN — HYDROMORPHONE HYDROCHLORIDE 0.5 MG: 1 INJECTION, SOLUTION INTRAMUSCULAR; INTRAVENOUS; SUBCUTANEOUS at 12:55

## 2024-01-31 RX ADMIN — GLYCOPYRROLATE 0.2 MG: 0.2 INJECTION, SOLUTION INTRAMUSCULAR; INTRAVENOUS at 09:51

## 2024-01-31 RX ADMIN — FINASTERIDE 5 MG: 5 TABLET, FILM COATED ORAL at 21:25

## 2024-01-31 RX ADMIN — SOTALOL HYDROCHLORIDE 120 MG: 120 TABLET ORAL at 21:22

## 2024-01-31 RX ADMIN — HYDROMORPHONE HYDROCHLORIDE 0.25 MG: 1 INJECTION, SOLUTION INTRAMUSCULAR; INTRAVENOUS; SUBCUTANEOUS at 09:55

## 2024-01-31 SDOH — SOCIAL STABILITY: SOCIAL INSECURITY: ABUSE: ADULT

## 2024-01-31 SDOH — SOCIAL STABILITY: SOCIAL INSECURITY: DO YOU FEEL ANYONE HAS EXPLOITED OR TAKEN ADVANTAGE OF YOU FINANCIALLY OR OF YOUR PERSONAL PROPERTY?: NO

## 2024-01-31 SDOH — SOCIAL STABILITY: SOCIAL INSECURITY: ARE THERE ANY APPARENT SIGNS OF INJURIES/BEHAVIORS THAT COULD BE RELATED TO ABUSE/NEGLECT?: NO

## 2024-01-31 SDOH — SOCIAL STABILITY: SOCIAL INSECURITY: HAVE YOU HAD THOUGHTS OF HARMING ANYONE ELSE?: NO

## 2024-01-31 SDOH — SOCIAL STABILITY: SOCIAL INSECURITY: DOES ANYONE TRY TO KEEP YOU FROM HAVING/CONTACTING OTHER FRIENDS OR DOING THINGS OUTSIDE YOUR HOME?: NO

## 2024-01-31 SDOH — SOCIAL STABILITY: SOCIAL INSECURITY: HAS ANYONE EVER THREATENED TO HURT YOUR FAMILY OR YOUR PETS?: NO

## 2024-01-31 SDOH — SOCIAL STABILITY: SOCIAL INSECURITY: ARE YOU OR HAVE YOU BEEN THREATENED OR ABUSED PHYSICALLY, EMOTIONALLY, OR SEXUALLY BY ANYONE?: NO

## 2024-01-31 SDOH — HEALTH STABILITY: MENTAL HEALTH: CURRENT SMOKER: 0

## 2024-01-31 SDOH — SOCIAL STABILITY: SOCIAL INSECURITY: DO YOU FEEL UNSAFE GOING BACK TO THE PLACE WHERE YOU ARE LIVING?: NO

## 2024-01-31 SDOH — SOCIAL STABILITY: SOCIAL INSECURITY: WERE YOU ABLE TO COMPLETE ALL THE BEHAVIORAL HEALTH SCREENINGS?: YES

## 2024-01-31 ASSESSMENT — PAIN - FUNCTIONAL ASSESSMENT
PAIN_FUNCTIONAL_ASSESSMENT: 0-10

## 2024-01-31 ASSESSMENT — ACTIVITIES OF DAILY LIVING (ADL)
DRESSING YOURSELF: NEEDS ASSISTANCE
WALKS IN HOME: NEEDS ASSISTANCE
HEARING - RIGHT EAR: FUNCTIONAL
ASSISTIVE_DEVICE: WALKER;EYEGLASSES
LACK_OF_TRANSPORTATION: PATIENT DECLINED
JUDGMENT_ADEQUATE_SAFELY_COMPLETE_DAILY_ACTIVITIES: YES
FEEDING YOURSELF: INDEPENDENT
ADEQUATE_TO_COMPLETE_ADL: YES
HEARING - LEFT EAR: FUNCTIONAL
GROOMING: INDEPENDENT
BATHING: NEEDS ASSISTANCE
TOILETING: NEEDS ASSISTANCE
PATIENT'S MEMORY ADEQUATE TO SAFELY COMPLETE DAILY ACTIVITIES?: YES

## 2024-01-31 ASSESSMENT — PAIN SCALES - GENERAL
PAINLEVEL_OUTOF10: 0 - NO PAIN
PAINLEVEL_OUTOF10: 3
PAINLEVEL_OUTOF10: 3
PAINLEVEL_OUTOF10: 4
PAINLEVEL_OUTOF10: 2
PAINLEVEL_OUTOF10: 3
PAINLEVEL_OUTOF10: 0 - NO PAIN
PAINLEVEL_OUTOF10: 3
PAIN_LEVEL: 0
PAINLEVEL_OUTOF10: 3

## 2024-01-31 ASSESSMENT — PATIENT HEALTH QUESTIONNAIRE - PHQ9
2. FEELING DOWN, DEPRESSED OR HOPELESS: NOT AT ALL
SUM OF ALL RESPONSES TO PHQ9 QUESTIONS 1 & 2: 0
1. LITTLE INTEREST OR PLEASURE IN DOING THINGS: NOT AT ALL

## 2024-01-31 ASSESSMENT — COGNITIVE AND FUNCTIONAL STATUS - GENERAL
CLIMB 3 TO 5 STEPS WITH RAILING: A LITTLE
PATIENT BASELINE BEDBOUND: NO
MOBILITY SCORE: 18
WALKING IN HOSPITAL ROOM: A LITTLE
STANDING UP FROM CHAIR USING ARMS: A LITTLE
MOVING TO AND FROM BED TO CHAIR: A LITTLE
DAILY ACTIVITIY SCORE: 20
MOVING FROM LYING ON BACK TO SITTING ON SIDE OF FLAT BED WITH BEDRAILS: A LITTLE
HELP NEEDED FOR BATHING: A LITTLE
TOILETING: A LITTLE
TURNING FROM BACK TO SIDE WHILE IN FLAT BAD: A LITTLE
DRESSING REGULAR UPPER BODY CLOTHING: A LITTLE
DRESSING REGULAR LOWER BODY CLOTHING: A LITTLE

## 2024-01-31 ASSESSMENT — LIFESTYLE VARIABLES
AUDIT-C TOTAL SCORE: 0
SKIP TO QUESTIONS 9-10: 1
AUDIT-C TOTAL SCORE: 0
HOW OFTEN DO YOU HAVE A DRINK CONTAINING ALCOHOL: NEVER
HOW OFTEN DO YOU HAVE 6 OR MORE DRINKS ON ONE OCCASION: NEVER
HOW MANY STANDARD DRINKS CONTAINING ALCOHOL DO YOU HAVE ON A TYPICAL DAY: PATIENT DOES NOT DRINK

## 2024-01-31 ASSESSMENT — COLUMBIA-SUICIDE SEVERITY RATING SCALE - C-SSRS
1. IN THE PAST MONTH, HAVE YOU WISHED YOU WERE DEAD OR WISHED YOU COULD GO TO SLEEP AND NOT WAKE UP?: NO
2. HAVE YOU ACTUALLY HAD ANY THOUGHTS OF KILLING YOURSELF?: NO
6. HAVE YOU EVER DONE ANYTHING, STARTED TO DO ANYTHING, OR PREPARED TO DO ANYTHING TO END YOUR LIFE?: NO

## 2024-01-31 NOTE — ANESTHESIA POSTPROCEDURE EVALUATION
Patient: Francis Vang    Procedure Summary       Date: 01/31/24 Room / Location: [facility] OR  / [facility] OR    Anesthesia Start: 0951 Anesthesia Stop: 1348    Procedure: Laminectomy Lumbar  L2-L4 DECOMPRESSION (Back) Diagnosis:       Lumbar stenosis with neurogenic claudication      (Lumbar stenosis with neurogenic claudication [M48.062])    Surgeons: Jessica Laureano MD Responsible Provider: Brian Melendez MD    Anesthesia Type: general ASA Status: 3            Anesthesia Type: general    Vitals Value Taken Time   /56 01/31/24 1345   Temp 36.1 01/31/24 1354   Pulse 56 01/31/24 1352   Resp 17 01/31/24 1352   SpO2 98 % 01/31/24 1352   Vitals shown include unvalidated device data.    Anesthesia Post Evaluation    Patient location during evaluation: PACU  Patient participation: complete - patient cannot participate  Level of consciousness: sleepy but conscious  Pain score: 0  Pain management: adequate  Multimodal analgesia pain management approach  Airway patency: patent  Two or more strategies used to mitigate risk of obstructive sleep apnea  Cardiovascular status: acceptable and stable  Respiratory status: acceptable and face mask  Hydration status: acceptable  Postoperative Nausea and Vomiting: none        No notable events documented.

## 2024-01-31 NOTE — OP NOTE
Laminectomy Lumbar  L2-L4 DECOMPRESSION Operative Note     Date: 2024  OR Location: [facility]    Name: Francis Vang, : 1945, Age: 78 y.o., MRN: [id number], Sex: male    Diagnosis  Pre-op Diagnosis     * Lumbar stenosis with neurogenic claudication [M48.062] Post-op Diagnosis     * Lumbar stenosis with neurogenic claudication [M48.062]     Procedures  Laminectomy Lumbar  L2-L4 DECOMPRESSION  41602 - [place] MILLER FACETECTOMY & FORAMOTOMY 1 VRT SGM LUMBAR    [place] MILLER FACETECTOMY&FORAMOT 1 VRT SGM EA ADDL SGM [86479]  MI ARTHRODESIS POSTERIOR/PSTLAT TQ 1NTRSPC LUMBAR [76119]  [place] ARTHRODESIS PST/PSTLAT TQ 1NTRSPC EA ADDL NTRSPC [84539]  MI POSTERIOR SEGMENTAL INSTRUMENTATION 3-6 VRT SEG [34352]  MI ALLOGRAFT FOR SPINE SURGERY ONLY MORSELIZED []  MI AUTOGRAFT SPINE SURGERY LOCAL FROM SAME INCISION []  Surgeons      * Jessica Laureano - Primary    Resident/Fellow/Other Assistant:  Surgeon(s) and Role:     * Bandar Lora MD - Resident - Assisting    Procedure Summary  Anesthesia: General  ASA: III  Anesthesia Staff: Anesthesiologist: Brian Melendez MD  C-AA: LOLA Godinez  Estimated Blood Loss: 200mL  Intra-op Medications:   Administrations occurring from 0930 to 1400 on 24:   Medication Name Total Dose   lidocaine-epinephrine (Xylocaine W/EPI) 1 %-1:100,000 injection 10 mL   vancomycin (Vancocin) vial for injection 1 g   sodium chloride 0.9 % irrigation solution 1,000 mL   BUPivacaine HCl (Marcaine) 0.25 % (2.5 mg/mL) injection 20 mL   oxygen (O2) therapy 152 L              Anesthesia Record               Intraprocedure I/O Totals       None           Specimen: No specimens collected     Staff:   Circulator: Darrel Ribeiro Jr., RN  Relief Circulator: Kaylyn Schneider RN  Relief Scrub: Celine Luna  Scrub Person: Ryan Clark         Drains and/or Catheters:   Closed/Suction Drain Midline Back Accordion 10 Fr. (Active)       Urethral Catheter Non-latex 16 Fr. (Active)   Site Assessment  Clean;Skin intact 01/31/24 1341   Collection Container Standard drainage bag 01/31/24 1341   Securement Method Securing device (Describe) 01/31/24 1341       Tourniquet Times:         Implants:  Implants       Type Name Action Serial No.      Screw SCREW, RELINE-O, 7.5X55MM 2S POLYAXIAL - [id number] Implanted      Neuro Interventional Implant SCREW, RELINE LOCK, 5.5MM OPEN TULIP - [id number] Implanted      Implant SCAFFOLD, STRIP, ATTRAX, 50 X 25 X 6MM, 15CC - [id number] Implanted               Findings: left sided synovial cyst at L2-3     Indications: Francis Vang is an 78 y.o. male who is having surgery for Lumbar stenosis with neurogenic claudication [M48.062]. The patient complains of LLE radiculopathy     The patient was seen in the preoperative area. The risks, benefits, complications, treatment options, non-operative alternatives, expected recovery and outcomes were discussed with the patient. The possibilities of reaction to medication, pulmonary aspiration, injury to surrounding structures, bleeding, recurrent infection, the need for additional procedures, failure to diagnose a condition, and creating a complication requiring transfusion or operation were discussed with the patient. The patient concurred with the proposed plan, giving informed consent.  The site of surgery was properly noted/marked if necessary per policy. The patient has been actively warmed in preoperative area. Preoperative antibiotics have been ordered and given within 1 hours of incision. Venous thrombosis prophylaxis have been ordered including bilateral sequential compression devices    Procedure Details: The patient was brought to the operating room theater. A verbal huddle was performed confirming the patient by name, date of birth, medical record number, site of surgery. After all team members were in agreement, they underwent anesthesia induction without complication. Two large bore IVs were placed as well as endotracheal  tube. Perioperative antibiotic administration was confirmed. The patient was flipped prone onto a Melvin table with posts and the back was prepped and draped in a sterile fashion. The patient's prior surgical incision was utilized as the basis of the current incision.    The skin was then infiltrated with lidocaine with epinephrine and next began procedure by using a 10 blade.  We incised the skin along the incision and using Bovie electrocautery and blunt dissection exposed the remaining posterior elements of the spine. The patient only ever had right sided nate-laminectomies, but all spinous processes were intact. We dissected out laterally to the TPs from L2 to L4 bilaterally.  After complete exposure and hemostasis with bipolar electrocautery and FloSeal we then turned our attention to the cannulation of the pedicles and instrumentation.    To begin we placed a spinous process clamp and an intraoperative CT scan was obtained of the lumbar spine for stereotactic navigation. This was merged in 3-Dimensional space and registered to the patient. Using a stereotactic probe we marked the entry sites for pedicle cannulation at L2-L4. Using a high speed drill we then cannulated pedicles with a navigated drill and we dilated the pedicles to 5.5mm with a navigated tap, palpated for any bony breach and 7.5x55mm screws were then placed into the pedicles without complication.     After pedicle screw placement, we then turned our attention to the decompression. The patient had a tremendous amount of scar tissue.  We performed a laminectomy on the remaining portions of L2-L4 with a high speed drill to try and find virgin dura. After removal of the bone we used Kerrison rongeurs, curettes, and blunt dissection to free the scar and to ascertain good decompression of the nerve roots. We encountered the expected synovial cyst on the left side along the L2-3 joint and it was removed in a piecemeal fashion after being dissected off  the dura. The wound was copiously irrigated and careful hemostasis was achieved with a bipolar and Floseal.      Bilateral titanium rods were placed after bending them to match the patient's lordosis. We then placed locking caps and these were final tightened and secured it into place. We then performed posterolateral arthrodesis on the remaining posterior elements of the spine at L2-L4 across the transverse processes and remaining aspects of the joints. We then packed more autologous bone graft mixed with allograft posterolaterally.    Next 1 gram of vancomycin powder was placed over the hardware, a 10Fr round drain was tunneled in a subfascial fashion, and hemostasis was achieved with Floseal and bipolar cautery. We then acquired final x-rays of our hardware confirming placement at our level of interest at L2-L4 and accurate placement within the bony elements and disc space. The muscle and fascia were approximated with 0 Vicryl sutures and then 2-0 Vicryl sutures were placed into the dermal layers and running locking suture was placed into the skin. The drain was secured with a nylon stitch and the patient was then flipped supine and they were extubated and returned to PACU in stable condition.      Complications:  None; patient tolerated the procedure well.    Disposition: PACU - hemodynamically stable.  Condition: stable         Additional Details: none    Attending Attestation: I was present and scrubbed for the key portions of the procedure.    Jessica Laureano  Phone Number: [phone]

## 2024-01-31 NOTE — BRIEF OP NOTE
Date: 2024  OR Location: [facility]    Name: Francis Vang, : 1945, Age: 78 y.o., MRN: [id number], Sex: male    Diagnosis  Pre-op Diagnosis     * Lumbar stenosis with neurogenic claudication [M48.062] Post-op Diagnosis     * Lumbar stenosis with neurogenic claudication [M48.062]     Procedures  Laminectomy Lumbar  L2-L4 DECOMPRESSION  87039 - [place] MILLER FACETECTOMY & FORAMOTOMY 1 VRT SGM LUMBAR    [place] MILLER FACETECTOMY&FORAMOT 1 VRT SGM EA ADDL SGM [29398]  [place] ARTHRODESIS POSTERIOR/PSTLAT TQ 1NTRSPC LUMBAR [16256]  [place] ARTHRODESIS PST/PSTLAT TQ 1NTRSPC EA ADDL NTRSPC [98703]  [place] POSTERIOR SEGMENTAL INSTRUMENTATION 3-6 VRT SEG [14707]  [place] ALLOGRAFT FOR SPINE SURGERY ONLY MORSELIZED []  [place] AUTOGRAFT SPINE SURGERY LOCAL FROM SAME INCISION []  Surgeons      * Jessica Laureano - Primary    Resident/Fellow/Other Assistant:  Surgeon(s) and Role:     * Bandar Lora MD - Resident - Assisting    Procedure Summary  Anesthesia: General  ASA: III  Anesthesia Staff: Anesthesiologist: Brian Melendez MD  C-AA: LOLA Godinez  Estimated Blood Loss: 100mL  Intra-op Medications:   Administrations occurring from 0930 to 1400 on 24:   Medication Name Total Dose   lidocaine-epinephrine (Xylocaine W/EPI) 1 %-1:100,000 injection 10 mL   vancomycin (Vancocin) vial for injection 1 g   sodium chloride 0.9 % irrigation solution 1,000 mL   BUPivacaine HCl (Marcaine) 0.25 % (2.5 mg/mL) injection 20 mL   oxygen (O2) therapy 152 L              Anesthesia Record               Intraprocedure I/O Totals       None           Specimen: No specimens collected     Staff:   Circulator: Darrel Ribeiro Jr., RN  Relief Circulator: Kaylyn Schneider RN  Relief Scrub: Celine Luna  Scrub Person: Ryan Clark          Findings: excellent hardware placement    Complications:  None; patient tolerated the procedure well.     Disposition: PACU - hemodynamically stable.  Condition: stable  Specimens Collected: No specimens  collected  Attending Attestation:     Jessica Laureano  Phone Number: [phone]

## 2024-01-31 NOTE — ANESTHESIA PROCEDURE NOTES
Peripheral IV  Date/Time: 1/31/2024 10:20 AM  Inserted by: LOLA Godinez    Placement  Needle size: 16 G  Laterality: right  Location: hand  Local anesthetic: none  Site prep: alcohol  Technique: anatomical landmarks  Attempts: 1

## 2024-01-31 NOTE — ANESTHESIA PREPROCEDURE EVALUATION
"Patient: Francis Vang    Procedure Information       Anesthesia Start Date/Time: 01/31/24 0951    Procedure: Laminectomy Lumbar  L2-L4 DECOMPRESSION    Location: [facility] OR 10 / [facility]    Surgeons: Jessica Laureano MD            Relevant Problems   Cardiovascular   (+) Cardiac arrhythmia   (+) Hyperlipidemia   (+) Primary hypertension      Endocrine   (+) DM type 2 with diabetic dyslipidemia (CMS/HCC)   (+) Diabetes mellitus type 2 without retinopathy (CMS/HCC)      GI   (+) GERD (gastroesophageal reflux disease)   (+) Rectal bleeding      /Renal   (+) Acquired cyst of kidney   (+) CKD (chronic kidney disease)   (+) Calculus of kidney   (+) Congenital cystic kidney disease      Neuro/Psych   (+) Lumbar radiculopathy   (+) Lumbosacral radiculopathy at L3   (+) Radiculopathy      Pulmonary   (+) Asthma   (+) CHRISTY on CPAP      Hematology   (+) Anemia      Musculoskeletal   (+) Lumbar stenosis with neurogenic claudication   (+) OA (osteoarthritis of spine)   (+) Osteoarthritis of multiple joints   (+) Osteoarthritis of spine with radiculopathy, lumbar region   (+) Primary osteoarthritis of both knees      Infectious Disease   (+) Tinea corporis       Clinical information reviewed:   Tobacco  Allergies  Meds   Med Hx  Surg Hx   Fam Hx  Soc Hx        NPO Detail:  NPO/Void Status  Carbohydrate Drink Given Prior to Surgery? : N  Date of Last Liquid: 01/31/24  Time of Last Liquid: 0715  Date of Last Solid: 01/30/24  Time of Last Solid: 0130  Last Intake Type: Clear fluids  Time of Last Void: 0730         Physical Exam    Airway  Mallampati: IV  TM distance: >3 FB  Neck ROM: full  Comments: Claims he had \"problems with anesthesia\" in the past, but unable to provide any specifics.   Cardiovascular   Rhythm: regular  Comments: Known bradycardia   Dental - normal exam     Pulmonary - normal exam     Abdominal - normal exam           Vitals:    01/31/24 0858   BP: 153/75   Pulse: (!) 46   Resp: 18   Temp: 36.4 °C " (97.5 °F)   SpO2: 98%       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    OTHER SURGICAL HISTORY  05/10/2019    Cholecystectomy    OTHER SURGICAL HISTORY      x3 back surgeries     Past Medical History:   Diagnosis Date    Anemia     Arthritis     Asthma     Cataracts, bilateral     Chronic anticoagulation     CKD (chronic kidney disease) stage 3, GFR 30-59 ml/min (CMS/[facility])     COPD (chronic obstructive pulmonary disease) (CMS/[facility])     Deep vein thrombosis (DVT) of lower extremity (CMS/[facility]) 04/22/2013    left leg    Diabetes mellitus (CMS/HCC)     DVT (deep venous thrombosis) (CMS/HCC)     Encounter for general adult medical examination without abnormal findings 08/13/2020    Encounter for Medicare annual wellness exam    GERD (gastroesophageal reflux disease)     Gout     History of DVT (deep vein thrombosis)     Hyperlipidemia     Hypertension     Neurogenic claudication     OA (osteoarthritis)     RAD (reactive airway disease)     Radiculopathy     Sleep apnea     CPAP    Spinal stenosis      No current facility-administered medications for this encounter.    Facility-Administered Medications Ordered in Other Encounters:     ceFAZolin in dextrose (iso-os) (Ancef) IVPB, , intravenous, PRN, Sivan Alkacace, CAA, 2 g at 01/31/24 1019  Prior to Admission medications    Medication Sig Start Date End Date Taking? Authorizing Provider   allopurinol (Zyloprim) 300 mg tablet TAKE 1 TABLET BY MOUTH EVERY DAY 11/11/23  Yes Jose Manuel Estrada MD   atorvastatin (Lipitor) 10 mg tablet TAKE 1 TABLET BY MOUTH EVERY DAY 7/20/23  Yes Rain Andrews APRN-CNP   chlorhexidine (Peridex) 0.12 % solution Sig: 15 mls swish and spit the night before surgery and 15mls swish and spit the morning of surgery do not swallow 12/13/23  Yes ZOIE Watts-CNP   finasteride (Proscar) 5 mg tablet TAKE 1 TABLET (5 MG) BY MOUTH ONCE DAILY. DO NOT CRUSH, CHEW, OR  SPLIT. 10/25/23 10/24/24 Yes Jose Manuel Estrada MD   hydroCHLOROthiazide (HYDRODiuril) 25 mg tablet TAKE 1/2 TABLET TWICE A DAY BY MOUTH  Patient taking differently: Take 1 tablet (25 mg) by mouth once daily. 12/28/23  Yes Jose Manuel Estrada MD   Jardiance 25 mg TAKE 1 TABLET BY MOUTH EVERY DAY 9/12/23  Yes Jose Manuel Estrada MD   lisinopril 5 mg tablet TAKE 1 TABLET BY MOUTH EVERY DAY 12/12/23  Yes Jose Manuel Estrada MD   montelukast (Singulair) 10 mg tablet TAKE 1 TABLET BY MOUTH EVERYDAY AT BEDTIME 11/11/23  Yes Jose Manuel Estrada MD   NIFEdipine XL 60 mg 24 hr tablet TAKE 1 TABLET BY MOUTH EVERY DAY 10/3/23  Yes Jose Manuel Estrada MD   pioglitazone (Actos) 45 mg tablet TAKE 1 TABLET BY MOUTH EVERY DAY 8/18/23  Yes Jose Manuel Estrada MD   solifenacin (VESIcare) 10 mg tablet TAKE 1 TABLET (10 MG) BY MOUTH ONCE DAILY. SWALLOW TABLET WHOLE DO NOT CRUSH, CHEW, OR SPLIT. 10/7/23 10/6/24 Yes Jose Manuel Estrada MD   sotalol  mg tablet TAKE 1 TABLET BY MOUTH TWICE A DAY 10/3/23  Yes Jose Manuel Estrada MD   blood-glucose meter misc Needed for Glucose reading 10/17/23   Jose Manuel Estrada MD   cyanocobalamin (Vitamin B-12) 1,000 mcg tablet Take 1 tablet (1,000 mcg) by mouth once daily.    Historical Provider, MD   gabapentin (Neurontin) 100 mg capsule Take 1 capsule (100 mg) by mouth 3 times a day.  Patient not taking: Reported on 1/31/2024 1/19/24 1/18/25  Lori Francois MD   nitroglycerin (Nitrostat) 0.4 mg SL tablet Place 1 tablet (0.4 mg) under the tongue every 5 minutes if needed for chest pain. 10/8/15   Historical Provider, MD   OneTouch Delica Plus Lancet 33 gauge misc 2 EACH 2 TIMES A DAY. 10/18/23   Jose Manuel Estrada MD   Xarelto 20 mg tablet TAKE 1 TABLET BY MOUTH EVERY DAY 11/14/23   Jose Manuel Estrada MD     Allergies   Allergen Reactions    Ragweed Pollen Other     Summer seasonal allergies     Social History     Tobacco Use    Smoking status: Never    Smokeless tobacco: Never   Substance Use Topics     Alcohol use: Not Currently         Chemistry    Lab Results   Component Value Date/Time     01/16/2024 0957    K 4.0 01/16/2024 0957     01/16/2024 0957    CO2 29 01/16/2024 0957    BUN 28 (H) 01/16/2024 0957    CREATININE 1.79 (H) 01/16/2024 0957    Lab Results   Component Value Date/Time    CALCIUM 9.3 01/16/2024 0957    ALKPHOS 75 12/04/2023 0759    AST 12 12/04/2023 0759    ALT 9 (L) 12/04/2023 0759    BILITOT 0.6 12/04/2023 0759          Lab Results   Component Value Date/Time    WBC 6.6 01/16/2024 0957    HGB 13.0 (L) 01/16/2024 0957    HCT 41.5 01/16/2024 0957     01/16/2024 0957     Lab Results   Component Value Date/Time    PROTIME 21.9 (H) 12/04/2023 0759    INR 1.9 (H) 12/04/2023 0759     No results found for this or any previous visit (from the past 4464 hour(s)).   Anesthesia Plan    History of general anesthesia?: yes  History of complications of general anesthesia?: unknown/emergency    ASA 3     general     The patient is not a current smoker.    intravenous induction   Anesthetic plan and risks discussed with patient.    Plan discussed with CAA.

## 2024-01-31 NOTE — ANESTHESIA PROCEDURE NOTES
Airway  Date/Time: 1/31/2024 10:04 AM  Urgency: elective    Airway not difficult    Staffing  Performed: LOLA   Authorized by: Brian Melendez MD    Performed by: LOLA Godinez  Patient location during procedure: OR    Indications and Patient Condition  Indications for airway management: anesthesia and airway protection  Spontaneous ventilation: present  Sedation level: deep  Preoxygenated: yes  Patient position: sniffing  MILS maintained throughout  Mask difficulty assessment: 3 - difficult mask (inadequate, unstable or two providers) +/- NMBA    Final Airway Details  Final airway type: endotracheal airway      Successful airway: ETT  Cuffed: yes   Successful intubation technique: video laryngoscopy  Facilitating devices/methods: intubating stylet  Endotracheal tube insertion site: oral  Blade: Da  Blade size: #4  ETT size (mm): 7.5  Cormack-Lehane Classification: grade I - full view of glottis  Placement verified by: chest auscultation and capnometry   Inital cuff pressure (cm H2O): 12  Cuff volume (mL): 6  Measured from: lips  ETT to lips (cm): 23  Number of attempts at approach: 1  Number of other approaches attempted: 0

## 2024-01-31 NOTE — CARE PLAN
The patient's goals for the shift include pain control    The clinical goals for the shift include pain control    Over the shift, the patient did not make progress toward the following goals. Barriers to progression include . Recommendations to address these barriers include .

## 2024-02-01 ENCOUNTER — DOCUMENTATION (OUTPATIENT)
Dept: HOME HEALTH SERVICES | Facility: HOME HEALTH | Age: 79
End: 2024-02-01
Payer: MEDICARE

## 2024-02-01 ENCOUNTER — HOME HEALTH ADMISSION (OUTPATIENT)
Dept: HOME HEALTH SERVICES | Facility: HOME HEALTH | Age: 79
End: 2024-02-01
Payer: MEDICARE

## 2024-02-01 VITALS
WEIGHT: 213 LBS | DIASTOLIC BLOOD PRESSURE: 61 MMHG | OXYGEN SATURATION: 94 % | TEMPERATURE: 96.8 F | HEIGHT: 73 IN | SYSTOLIC BLOOD PRESSURE: 113 MMHG | BODY MASS INDEX: 28.23 KG/M2 | HEART RATE: 50 BPM | RESPIRATION RATE: 17 BRPM

## 2024-02-01 LAB
ANION GAP SERPL CALC-SCNC: 13 MMOL/L (ref 10–20)
BACTERIA UR CULT: NO GROWTH
BUN SERPL-MCNC: 35 MG/DL (ref 6–23)
CALCIUM SERPL-MCNC: 8.4 MG/DL (ref 8.6–10.3)
CHLORIDE SERPL-SCNC: 104 MMOL/L (ref 98–107)
CO2 SERPL-SCNC: 26 MMOL/L (ref 21–32)
CREAT SERPL-MCNC: 1.68 MG/DL (ref 0.5–1.3)
EGFRCR SERPLBLD CKD-EPI 2021: 42 ML/MIN/1.73M*2
ERYTHROCYTE [DISTWIDTH] IN BLOOD BY AUTOMATED COUNT: 16.6 % (ref 11.5–14.5)
GLUCOSE BLD MANUAL STRIP-MCNC: 216 MG/DL (ref 74–99)
GLUCOSE SERPL-MCNC: 134 MG/DL (ref 74–99)
HCT VFR BLD AUTO: 33.8 % (ref 41–52)
HGB BLD-MCNC: 10.8 G/DL (ref 13.5–17.5)
MCH RBC QN AUTO: 31.4 PG (ref 26–34)
MCHC RBC AUTO-ENTMCNC: 32 G/DL (ref 32–36)
MCV RBC AUTO: 98 FL (ref 80–100)
NRBC BLD-RTO: 0 /100 WBCS (ref 0–0)
PLATELET # BLD AUTO: 209 X10*3/UL (ref 150–450)
POTASSIUM SERPL-SCNC: 3.6 MMOL/L (ref 3.5–5.3)
RBC # BLD AUTO: 3.44 X10*6/UL (ref 4.5–5.9)
SODIUM SERPL-SCNC: 139 MMOL/L (ref 136–145)
WBC # BLD AUTO: 9.3 X10*3/UL (ref 4.4–11.3)

## 2024-02-01 PROCEDURE — 7100000011 HC EXTENDED STAY RECOVERY HOURLY - NURSING UNIT

## 2024-02-01 PROCEDURE — 2500000004 HC RX 250 GENERAL PHARMACY W/ HCPCS (ALT 636 FOR OP/ED): Mod: MUE | Performed by: STUDENT IN AN ORGANIZED HEALTH CARE EDUCATION/TRAINING PROGRAM

## 2024-02-01 PROCEDURE — 82947 ASSAY GLUCOSE BLOOD QUANT: CPT | Mod: 59

## 2024-02-01 PROCEDURE — 97535 SELF CARE MNGMENT TRAINING: CPT | Mod: GO

## 2024-02-01 PROCEDURE — 97161 PT EVAL LOW COMPLEX 20 MIN: CPT | Mod: GP

## 2024-02-01 PROCEDURE — 80048 BASIC METABOLIC PNL TOTAL CA: CPT | Performed by: STUDENT IN AN ORGANIZED HEALTH CARE EDUCATION/TRAINING PROGRAM

## 2024-02-01 PROCEDURE — 97165 OT EVAL LOW COMPLEX 30 MIN: CPT | Mod: GO

## 2024-02-01 PROCEDURE — 2500000001 HC RX 250 WO HCPCS SELF ADMINISTERED DRUGS (ALT 637 FOR MEDICARE OP): Performed by: STUDENT IN AN ORGANIZED HEALTH CARE EDUCATION/TRAINING PROGRAM

## 2024-02-01 PROCEDURE — 36415 COLL VENOUS BLD VENIPUNCTURE: CPT | Performed by: STUDENT IN AN ORGANIZED HEALTH CARE EDUCATION/TRAINING PROGRAM

## 2024-02-01 PROCEDURE — 85027 COMPLETE CBC AUTOMATED: CPT | Performed by: STUDENT IN AN ORGANIZED HEALTH CARE EDUCATION/TRAINING PROGRAM

## 2024-02-01 PROCEDURE — 97116 GAIT TRAINING THERAPY: CPT | Mod: GP

## 2024-02-01 PROCEDURE — 2500000002 HC RX 250 W HCPCS SELF ADMINISTERED DRUGS (ALT 637 FOR MEDICARE OP, ALT 636 FOR OP/ED): Performed by: STUDENT IN AN ORGANIZED HEALTH CARE EDUCATION/TRAINING PROGRAM

## 2024-02-01 RX ORDER — CYCLOBENZAPRINE HCL 10 MG
10 TABLET ORAL 3 TIMES DAILY PRN
Qty: 21 TABLET | Refills: 0 | Status: SHIPPED | OUTPATIENT
Start: 2024-02-01 | End: 2024-02-08

## 2024-02-01 RX ORDER — OXYCODONE HYDROCHLORIDE 5 MG/1
5 TABLET ORAL EVERY 4 HOURS PRN
Qty: 20 TABLET | Refills: 0 | Status: SHIPPED | OUTPATIENT
Start: 2024-02-01 | End: 2024-02-06

## 2024-02-01 RX ADMIN — OXYBUTYNIN CHLORIDE 5 MG: 5 TABLET ORAL at 08:49

## 2024-02-01 RX ADMIN — NIFEDIPINE 60 MG: 60 TABLET, FILM COATED, EXTENDED RELEASE ORAL at 08:49

## 2024-02-01 RX ADMIN — HEPARIN SODIUM 5000 UNITS: 5000 INJECTION INTRAVENOUS; SUBCUTANEOUS at 08:48

## 2024-02-01 RX ADMIN — SENNOSIDES AND DOCUSATE SODIUM 2 TABLET: 8.6; 5 TABLET ORAL at 08:48

## 2024-02-01 RX ADMIN — INSULIN LISPRO 2 UNITS: 100 INJECTION, SOLUTION INTRAVENOUS; SUBCUTANEOUS at 12:05

## 2024-02-01 RX ADMIN — SOTALOL HYDROCHLORIDE 120 MG: 120 TABLET ORAL at 08:48

## 2024-02-01 RX ADMIN — LISINOPRIL 5 MG: 5 TABLET ORAL at 08:49

## 2024-02-01 RX ADMIN — ALLOPURINOL 300 MG: 300 TABLET ORAL at 08:48

## 2024-02-01 RX ADMIN — HYDROCHLOROTHIAZIDE 25 MG: 25 TABLET ORAL at 08:49

## 2024-02-01 RX ADMIN — ACETAMINOPHEN 650 MG: 325 TABLET ORAL at 04:42

## 2024-02-01 ASSESSMENT — COGNITIVE AND FUNCTIONAL STATUS - GENERAL
WALKING IN HOSPITAL ROOM: A LITTLE
TOILETING: A LITTLE
HELP NEEDED FOR BATHING: A LITTLE
DRESSING REGULAR UPPER BODY CLOTHING: A LITTLE
DRESSING REGULAR LOWER BODY CLOTHING: A LITTLE
STANDING UP FROM CHAIR USING ARMS: A LITTLE
PERSONAL GROOMING: A LITTLE
DAILY ACTIVITIY SCORE: 19
MOVING FROM LYING ON BACK TO SITTING ON SIDE OF FLAT BED WITH BEDRAILS: A LITTLE
TURNING FROM BACK TO SIDE WHILE IN FLAT BAD: A LITTLE
MOVING TO AND FROM BED TO CHAIR: A LITTLE
MOBILITY SCORE: 18
CLIMB 3 TO 5 STEPS WITH RAILING: A LITTLE

## 2024-02-01 ASSESSMENT — PAIN SCALES - GENERAL
PAINLEVEL_OUTOF10: 0 - NO PAIN

## 2024-02-01 ASSESSMENT — ACTIVITIES OF DAILY LIVING (ADL)
HOME_MANAGEMENT_TIME_ENTRY: 8
ADL_ASSISTANCE: INDEPENDENT
ADL_ASSISTANCE: INDEPENDENT
BATHING_ASSISTANCE: STAND BY

## 2024-02-01 ASSESSMENT — PAIN - FUNCTIONAL ASSESSMENT
PAIN_FUNCTIONAL_ASSESSMENT: 0-10
PAIN_FUNCTIONAL_ASSESSMENT: 0-10

## 2024-02-01 NOTE — PROGRESS NOTES
Spiritual Care Visit    Clinical Encounter Type  Visited With: Patient  Routine Visit: Introduction  Continue Visiting: No                                            Taxonomy  Intended Effects: Promote sense of peace, Preserve dignity and respect  Methods: Offer support  Interventions: Share words of hope and inspiration    Patient shared he has a wife and three grand children that his wife helps to care for during the day.   listened as talking about his family brought him raymond.   was a supportive presence.

## 2024-02-01 NOTE — CARE PLAN
The patient's goals for the shift include pain control    The clinical goals for the shift include promote rest and pain control    Over the shift, the patient did not make progress toward the following goals. Barriers to progression include . Recommendations to address these barriers include .

## 2024-02-01 NOTE — HH CARE COORDINATION
Home Care received a Referral for Physical Therapy. We have processed the referral for a Start of Care on 02/02/2024.     If you have any questions or concerns, please feel free to contact us at [phone]. Follow the prompts, enter your five digit zip code, and you will be directed to your care team on [facility].

## 2024-02-01 NOTE — CARE PLAN
The patient's goals for the shift include pain control    The clinical goals for the shift include promote rest and pain control    Problem: Pain - Adult  Goal: Verbalizes/displays adequate comfort level or baseline comfort level  Outcome: Progressing     Problem: Safety - Adult  Goal: Free from fall injury  Outcome: Progressing     Problem: Discharge Planning  Goal: Discharge to home or other facility with appropriate resources  Outcome: Progressing     Problem: Chronic Conditions and Co-morbidities  Goal: Patient's chronic conditions and co-morbidity symptoms are monitored and maintained or improved  Outcome: Progressing

## 2024-02-01 NOTE — PROGRESS NOTES
Occupational Therapy    Evaluation    Patient Name: Francis Vang  MRN: [id number]  Today's Date: 2/1/2024  Time Calculation  Start Time: 1144  Stop Time: 1200  Time Calculation (min): 16 min        Assessment:  End of Session Communication: Bedside nurse  End of Session Patient Position: Bed, 3 rail up, Alarm on (All needs in reach.  Educated on positioning in bed in order to increase back comfort.)  OT Assessment Results: Decreased ADL status, Decreased endurance, Decreased functional mobility, Decreased IADLs  Plan:  Treatment Interventions: ADL retraining, Functional transfer training, Endurance training, Equipment evaluation/education, Patient/family training, Compensatory technique education  OT Frequency: 3 times per week  OT Discharge Recommendations: Low intensity level of continued care  Equipment Recommended upon Discharge: Wheeled walker  OT - OK to Discharge: Yes (when medically appropriate)  Treatment Interventions: ADL retraining, Functional transfer training, Endurance training, Equipment evaluation/education, Patient/family training, Compensatory technique education    Subjective   Current Problem:  1. Lumbar stenosis with neurogenic claudication  cyclobenzaprine (Flexeril) 10 mg tablet    oxyCODONE (Roxicodone) 5 mg immediate release tablet    Walker rolling    Referral to Home Health      2. Acute postoperative pain  cyclobenzaprine (Flexeril) 10 mg tablet    oxyCODONE (Roxicodone) 5 mg immediate release tablet    Referral to Home Health      3. Lumbar radiculopathy  Walker rolling    Referral to Home Health      4. H/O lumbosacral spine surgery  Walker rolling      5. Pseudophakia, both eyes        6. Weakness of left leg  Walker rolling        General:  General  Reason for Referral: recent spine surgery  Referred By: Georgi  Past Medical History Relevant to Rehab: anemia, asthma, CKD, DVT, DM, radiculopathy  Family/Caregiver Present: No  Prior to Session Communication: Bedside nurse  Patient  Position Received: Bed, 3 rail up, Alarm on  General Comment: Laminectomy Lumbar  L2-L4 decompression 1/31 by Dr. Laureano.  Precautions:  Medical Precautions: Fall precautions, Spinal precautions (hemovac drain)  Vital Signs:     Pain:  Pain Assessment  Pain Assessment: 0-10  Pain Score: 0 - No pain    Objective   Cognition:  Overall Cognitive Status: Within Functional Limits  Orientation Level: Oriented X4           Home Living:  Type of Home: House  Lives With: Spouse  Home Layout: Two level  Home Access: Stairs to enter without rails  Entrance Stairs-Number of Steps: 15  Bathroom Shower/Tub: Walk-in shower  Home Living Comments: spouse able to assist at discharge  Prior Function:  ADL Assistance: Independent  Homemaking Assistance: Independent  Ambulatory Assistance: Independent  Prior Function Comments: denies any falls in the past 6 months  IADL History:     ADL:  Eating Assistance: Independent  Grooming Assistance: Stand by  Bathing Assistance: Stand by  UE Dressing Deficit: Setup  LE Dressing Assistance: Stand by  Toileting Assistance with Device: Stand by  ADL Comments: Educated on modified technique for LB ADL in order to maintain spinal precautions. Pt. demonstrated good carryover.  Educated on spinal precautions and application to ADL and functional mobility.  Activity Tolerance:     Bed Mobility/Transfers: Bed Mobility  Bed Mobility:  (Educated on and completed log roll technique for sup to sit and sit to sup with good carryover of education.  Supervision level.)    Transfers  Transfer:  (Sit to stand and stand to sit at EOB, sit to stand and stand to sit at toilet, verbal cues for safe technique.  Supervision with FWW.)      Ambulation/Gait Training:  Ambulation/Gait Training  Ambulation/Gait Training Performed:  (Ambulated in room with FWW at sup to mod I level.  Educated on squaring body and walker to task for functional activities.)  Sitting Balance:  Static Sitting Balance  Static  Sitting-Comment/Number of Minutes: Normal  Dynamic Sitting Balance  Dynamic Sitting-Comments: Normal  Standing Balance:  Static Standing Balance  Static Standing-Comment/Number of Minutes: Good  Dynamic Standing Balance  Dynamic Standing-Comments: Good (-)     Extremities: RUE   RUE :  (AROM WFL, strength not tested.) and ARYE   LUE:  (AROM WFL, strength not tested.)      Outcome Measures:[facility] Daily Activity  Putting on and taking off regular lower body clothing: A little  Bathing (including washing, rinsing, drying): A little  Putting on and taking off regular upper body clothing: A little  Toileting, which includes using toilet, bedpan or urinal: A little  Taking care of personal grooming such as brushing teeth: A little  Eating Meals: None  Daily Activity - Total Score: 19        Education Documentation  Precautions, taught by Chen Bolton OT at 2/1/2024  5:29 PM.  Learner: Patient  Readiness: Acceptance  Method: Explanation  Response: Verbalizes Understanding    ADL Training, taught by Chen Bolton OT at 2/1/2024  5:29 PM.  Learner: Patient  Readiness: Acceptance  Method: Explanation  Response: Verbalizes Understanding    Education Comments  No comments found.        OP EDUCATION:       Goals:  Encounter Problems       Encounter Problems (Active)       OT Goals       Mod I ADL/IADL functional mobility with FWW.        Start:  02/01/24    Expected End:  02/15/24            Mod I LB ADL while maintaining spinal precautions.        Start:  02/01/24    Expected End:  02/15/24            Mod I all transfers with FWW.        Start:  02/01/24    Expected End:  02/15/24

## 2024-02-01 NOTE — PROGRESS NOTES
02/01/24 1028   Discharge Planning   Living Arrangements Spouse/significant other   Support Systems Spouse/significant other   Type of Residence Private residence   Home or Post Acute Services None   Patient expects to be discharged to: Home   Does the patient need discharge transport arranged? No     Met with patient at bedside. Admitted for lumbar laminectomy. Pt lives with spouse and was independent PTA with no HHC. Pt uses CPAP at HS. Pt was able to drive and obtain medications. Therapy evals pending. Pt plans to return home with no new discharge needs. ADOD today or tomorrow. Family will provide transport.

## 2024-02-01 NOTE — DISCHARGE SUMMARY
Discharge Diagnosis  Lumbar stenosis with neurogenic claudication    Issues Requiring Follow-Up  Home health    Test Results Pending At Discharge  Pending Labs       No current pending labs.            Hospital Course    Patient underwent lumbar decompression by Dr. Laureano. He recovered well in PACU and transferred to [facility] floor for remainder of his care. He has tolerated oral intake for nutrition and pain medication well. He is urinating on own with no complications. He is ambulatory with assistance and walker recommended. On day of discharge He is medically stable. Will DC to home with home health.     14/14 systems reviewed and negative other than what is listed        Pertinent Physical Exam At Time of Discharge  Physical Exam  General: Well developed, awake/alert/oriented x3, no distress, alert and cooperative  Skin: Warm and dry, no lesions, no rashes  ENMT: Mucous membranes moist, no apparent injury, no lesions seen  Head/Neck: Neck Supple, no apparent injury  Respiratory/Thorax: Normal breath sounds with good chest expansion, thorax symmetric  Cardiovascular: No pitting edema, no JVD    Motor Strength: 5/5 Throughout all extremities    Muscle Bulk: Normal and symmetric in all extremities    Posture:   -- Cervical: Normal  -- Thoracic: Normal  -- Lumbar : Normal  Paraspinal muscle spasm/tenderness mild.   Midline tenderness at incision    Incision cdi    Sensation: intact to light touch      Home Medications     Medication List      START taking these medications     cyclobenzaprine 10 mg tablet; Commonly known as: Flexeril; Take 1 tablet   (10 mg) by mouth 3 times a day as needed for muscle spasms for up to 7   days.   oxyCODONE 5 mg immediate release tablet; Commonly known as: Roxicodone;   Take 1 tablet (5 mg) by mouth every 4 hours if needed for moderate pain (4   - 6) or severe pain (7 - 10) for up to 5 days.     CHANGE how you take these medications     atorvastatin 10 mg tablet; Commonly known as:  Lipitor; TAKE 1 TABLET BY   MOUTH EVERY DAY; What changed: when to take this   hydroCHLOROthiazide 25 mg tablet; Commonly known as: HYDRODiuril; TAKE   1/2 TABLET TWICE A DAY BY MOUTH; What changed: See the new instructions.     CONTINUE taking these medications     allopurinol 300 mg tablet; Commonly known as: Zyloprim; TAKE 1 TABLET BY   MOUTH EVERY DAY   blood-glucose meter misc; Needed for Glucose reading   finasteride 5 mg tablet; Commonly known as: Proscar; TAKE 1 TABLET (5   MG) BY MOUTH ONCE DAILY. DO NOT CRUSH, CHEW, OR SPLIT.   Jardiance 25 mg; Generic drug: empagliflozin; TAKE 1 TABLET BY MOUTH   EVERY DAY   lisinopril 5 mg tablet; TAKE 1 TABLET BY MOUTH EVERY DAY   montelukast 10 mg tablet; Commonly known as: Singulair; TAKE 1 TABLET BY   MOUTH EVERYDAY AT BEDTIME   NIFEdipine ER 60 mg 24 hr tablet; Commonly known as: Adalat CC; TAKE 1   TABLET BY MOUTH EVERY DAY   nitroglycerin 0.4 mg SL tablet; Commonly known as: Nitrostat   OneTouch Delica Plus Lancet 33 gauge misc; Generic drug: lancets; 2 EACH   2 TIMES A DAY.   pioglitazone 45 mg tablet; Commonly known as: Actos; TAKE 1 TABLET BY   MOUTH EVERY DAY   solifenacin 10 mg tablet; Commonly known as: VESIcare; TAKE 1 TABLET (10   MG) BY MOUTH ONCE DAILY. SWALLOW TABLET WHOLE DO NOT CRUSH, CHEW, OR   SPLIT.   sotalol  mg tablet; Generic drug: sotalol; TAKE 1 TABLET BY MOUTH   TWICE A DAY   Xarelto 20 mg tablet; Generic drug: rivaroxaban; TAKE 1 TABLET BY MOUTH   EVERY DAY     STOP taking these medications     chlorhexidine 0.12 % solution; Commonly known as: Peridex   gabapentin 100 mg capsule; Commonly known as: Neurontin       Outpatient Follow-Up  Future Appointments   Date Time Provider Department Center   2/22/2024 10:00 AM Jessica Laureano MD [id number] [place]   3/21/2024  9:30 AM Jessica Laureano MD [id number] Manuel Fowler PA-C

## 2024-02-01 NOTE — PROGRESS NOTES
Physical Therapy    Physical Therapy Evaluation    Patient Name: Francis Vang  MRN: [id number]  Today's Date: 2/1/2024   Time Calculation  Start Time: 1020  Stop Time: 1048  Time Calculation (min): 28 min    Assessment/Plan   PT Assessment  PT Assessment Results: Decreased strength, Decreased endurance, Impaired balance, Decreased mobility, Pain, Orthopedic restrictions  Rehab Prognosis: Good  Evaluation/Treatment Tolerance: Patient tolerated treatment well  Medical Staff Made Aware: Yes  End of Session Communication: Bedside nurse  Assessment Comment: Pt presents today below baseline level of function and requires continued PT during hospital stay. Pt requires PT at a low intensity level at discharge to maximize functional mobility and safety.    End of Session Patient Position: Up in chair  IP OR SWING BED PT PLAN  Inpatient or Swing Bed: Inpatient  PT Plan  Treatment/Interventions: Bed mobility, Transfer training, Gait training, Balance training, Neuromuscular re-education, Strengthening, Endurance training, Range of motion, Therapeutic exercise, Therapeutic activity, Home exercise program, Stair training  PT Plan: Skilled PT  PT Frequency: Daily  PT Discharge Recommendations: Low intensity level of continued care  Equipment Recommended upon Discharge: Wheeled walker  PT Recommended Transfer Status: Assist x1 (FWW, gait belt)  PT - OK to Discharge: Yes (To next level of care when cleared by medical team   )    Subjective       General Visit Information:  General  Reason for Referral: recent spine surgery  Referred By: Jessica Laureano MD  Past Medical History Relevant to Rehab: Admitted 1/31/24 following completion of lumbar laminectomy L2-L4 decompression. PMH: anemia, asthma, CKD, DVT, DM, radiculopathy  Family/Caregiver Present: No  Prior to Session Communication: Bedside nurse  Patient Position Received: Up in chair  Preferred Learning Style: verbal  General Comment: Pt agreeable to PT, nursing cleared for  treatment.    Home Living:  Home Living  Type of Home: House  Lives With: Spouse  Home Layout: Two level  Home Access: Stairs to enter without rails  Entrance Stairs-Number of Steps: 15  Bathroom Shower/Tub: Walk-in shower  Home Living Comments: spouse able to assist at discharge    Prior Level of Function:  Prior Function Per Pt/Caregiver Report  ADL Assistance: Independent  Homemaking Assistance: Independent  Ambulatory Assistance: Independent  Prior Function Comments: denies any falls in the past 6 months    Precautions:  Precautions  Medical Precautions: Fall precautions, Spinal precautions (hemovac drain)    Vital Signs:  Vital Signs  SpO2:  (90-96% throughout session on RA)  Objective     Pain:  Pain Assessment  Pain Assessment: 0-10  Pain Score: 0 - No pain    Cognition:  Cognition  Overall Cognitive Status: Within Functional Limits    General Assessments:      Activity Tolerance  Endurance: Tolerates 10 - 20 min exercise with multiple rests  Sensation  Sensation Comment: denies numbness and tingling              Static Sitting Balance  Static Sitting-Comment/Number of Minutes: good  Dynamic Sitting Balance  Dynamic Sitting-Comments: good  Static Standing Balance  Static Standing-Comment/Number of Minutes: fair  Dynamic Standing Balance  Dynamic Standing-Comments: fair    Functional Assessments:     Bed Mobility  Bed Mobility: No  Transfers  Transfer: Yes  Transfer 1  Transfer From 1: Sit to  Transfer to 1: Stand  Transfer Device 1: Walker  Transfer Level of Assistance 1: Close supervision  Transfers 2  Transfer From 2: Stand to  Transfer to 2: Sit  Transfer Device 2: Walker  Transfer Level of Assistance 2: Close supervision  Ambulation/Gait Training  Ambulation/Gait Training Performed: Yes  Ambulation/Gait Training 1  Device 1: Rolling walker  Gait Support Devices: Gait belt  Assistance 1: Close supervision  Quality of Gait 1: Inconsistent stride length, Decreased step length  Comments/Distance (ft) 1: 200  feet  Stairs  Stairs: Yes  Stairs  Rails 1: Left  Device 1: Railing  Support Devices 1: Gait belt  Assistance 1: Close supervision  Comment/Number of Steps 1: 3 stairs, step to step pattern     Treatment    Pt educated on spinal precautions and log roll technique. Cues for safe hand placement with use of FWW for sit<>stand. Instruction provided in safe stair climbing strategy. Cues for deep breathing and safe pacing given throughout. Recommended pt use FWW for all ambulation and mobility to maximize safety. Recommended pt remain on first floor of home initially to maximize safety.     Extremity/Trunk Assessments:        RLE   RLE : Within Functional Limits  LLE   LLE : Within Functional Limits    Outcome Measures:  [facility] Basic Mobility  Turning from your back to your side while in a flat bed without using bedrails: A little  Moving from lying on your back to sitting on the side of a flat bed without using bedrails: A little  Moving to and from bed to chair (including a wheelchair): A little  Standing up from a chair using your arms (e.g. wheelchair or bedside chair): A little  To walk in hospital room: A little  Climbing 3-5 steps with railing: A little  Basic Mobility - Total Score: 18                            Goals:  Encounter Problems       Encounter Problems (Active)       PT Problem       Pt will perform bed mobility with mod I.        Start:  02/01/24    Expected End:  02/15/24            Pt will ambulate 300 feet mod I using FWW with no significant gait deviations.         Start:  02/01/24    Expected End:  02/15/24            Pt will ascend/descend at least 15 stairs using rail SBA in order to navigate home environment.         Start:  02/01/24    Expected End:  02/15/24            Pt will progress to completing 3 x 20 supine/seated exercises in order to increase strength and improve gait mechanics.         Start:  02/01/24    Expected End:  02/15/24                 Education Documentation  Handouts,  taught by Marie Wiley PT at 2/1/2024 12:37 PM.  Learner: Patient  Readiness: Acceptance  Method: Explanation, Demonstration, Handout  Response: Verbalizes Understanding, Demonstrated Understanding    Precautions, taught by Marie Wiley PT at 2/1/2024 12:37 PM.  Learner: Patient  Readiness: Acceptance  Method: Explanation, Demonstration, Handout  Response: Verbalizes Understanding, Demonstrated Understanding    Body Mechanics, taught by Marie Wiley PT at 2/1/2024 12:37 PM.  Learner: Patient  Readiness: Acceptance  Method: Explanation, Demonstration, Handout  Response: Verbalizes Understanding, Demonstrated Understanding    Mobility Training, taught by Marie Wiley PT at 2/1/2024 12:37 PM.  Learner: Patient  Readiness: Acceptance  Method: Explanation, Demonstration, Handout  Response: Verbalizes Understanding, Demonstrated Understanding    Education Comments  No comments found.

## 2024-02-01 NOTE — NURSING NOTE
Pt cleared for discharge. IVs removed. Reviewed DC instructions with pt. Addressed all questions/concerns. One Rx sent to pt's pharmacy for . Discharge to main entrance with walker to meet ride.

## 2024-02-02 ENCOUNTER — DOCUMENTATION (OUTPATIENT)
Dept: HOME HEALTH SERVICES | Facility: HOME HEALTH | Age: 79
End: 2024-02-02
Payer: MEDICARE

## 2024-02-02 NOTE — HH CARE COORDINATION
Patient was contacted and is declining homecare. In basket message sent to Dilshad PINON for Dr. Laureano to inform of non admit due to patient refusal. Patient is scheduled for neurosurgery follow up 2/22

## 2024-02-09 DIAGNOSIS — E11.69 DM TYPE 2 WITH DIABETIC DYSLIPIDEMIA (MULTI): Primary | ICD-10-CM

## 2024-02-09 DIAGNOSIS — E78.5 DM TYPE 2 WITH DIABETIC DYSLIPIDEMIA (MULTI): Primary | ICD-10-CM

## 2024-02-21 ENCOUNTER — APPOINTMENT (OUTPATIENT)
Dept: NEUROSURGERY | Facility: CLINIC | Age: 79
End: 2024-02-21
Payer: MEDICARE

## 2024-02-22 ENCOUNTER — OFFICE VISIT (OUTPATIENT)
Dept: NEUROSURGERY | Facility: CLINIC | Age: 79
End: 2024-02-22
Payer: MEDICARE

## 2024-02-22 VITALS
DIASTOLIC BLOOD PRESSURE: 64 MMHG | SYSTOLIC BLOOD PRESSURE: 110 MMHG | WEIGHT: 221.8 LBS | BODY MASS INDEX: 29.4 KG/M2 | HEART RATE: 47 BPM | HEIGHT: 73 IN | RESPIRATION RATE: 14 BRPM

## 2024-02-22 DIAGNOSIS — M48.062 LUMBAR STENOSIS WITH NEUROGENIC CLAUDICATION: Primary | ICD-10-CM

## 2024-02-22 PROCEDURE — 99024 POSTOP FOLLOW-UP VISIT: CPT | Performed by: STUDENT IN AN ORGANIZED HEALTH CARE EDUCATION/TRAINING PROGRAM

## 2024-02-22 PROCEDURE — 1160F RVW MEDS BY RX/DR IN RCRD: CPT | Performed by: STUDENT IN AN ORGANIZED HEALTH CARE EDUCATION/TRAINING PROGRAM

## 2024-02-22 PROCEDURE — 3074F SYST BP LT 130 MM HG: CPT | Performed by: STUDENT IN AN ORGANIZED HEALTH CARE EDUCATION/TRAINING PROGRAM

## 2024-02-22 PROCEDURE — 1036F TOBACCO NON-USER: CPT | Performed by: STUDENT IN AN ORGANIZED HEALTH CARE EDUCATION/TRAINING PROGRAM

## 2024-02-22 PROCEDURE — 3078F DIAST BP <80 MM HG: CPT | Performed by: STUDENT IN AN ORGANIZED HEALTH CARE EDUCATION/TRAINING PROGRAM

## 2024-02-22 PROCEDURE — 1159F MED LIST DOCD IN RCRD: CPT | Performed by: STUDENT IN AN ORGANIZED HEALTH CARE EDUCATION/TRAINING PROGRAM

## 2024-02-22 PROCEDURE — 1126F AMNT PAIN NOTED NONE PRSNT: CPT | Performed by: STUDENT IN AN ORGANIZED HEALTH CARE EDUCATION/TRAINING PROGRAM

## 2024-02-22 ASSESSMENT — PATIENT HEALTH QUESTIONNAIRE - PHQ9
SUM OF ALL RESPONSES TO PHQ9 QUESTIONS 1 & 2: 0
1. LITTLE INTEREST OR PLEASURE IN DOING THINGS: NOT AT ALL
2. FEELING DOWN, DEPRESSED OR HOPELESS: NOT AT ALL

## 2024-02-22 ASSESSMENT — PAIN SCALES - GENERAL: PAINLEVEL: 0-NO PAIN

## 2024-02-22 NOTE — PROGRESS NOTES
Francis Vang is a 78 y.o. year old male s/p L2-4 decompression    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    OTHER SURGICAL HISTORY  05/10/2019    Cholecystectomy    OTHER SURGICAL HISTORY      x3 back surgeries           Current Outpatient Medications:     allopurinol (Zyloprim) 300 mg tablet, TAKE 1 TABLET BY MOUTH EVERY DAY, Disp: 90 tablet, Rfl: 1    atorvastatin (Lipitor) 10 mg tablet, TAKE 1 TABLET BY MOUTH EVERY DAY (Patient taking differently: Take 1 tablet (10 mg) by mouth once daily at bedtime.), Disp: 90 tablet, Rfl: 1    blood-glucose meter misc, Needed for Glucose reading, Disp: 1 each, Rfl: 0    finasteride (Proscar) 5 mg tablet, TAKE 1 TABLET (5 MG) BY MOUTH ONCE DAILY. DO NOT CRUSH, CHEW, OR SPLIT., Disp: 90 tablet, Rfl: 1    hydroCHLOROthiazide (HYDRODiuril) 25 mg tablet, TAKE 1/2 TABLET TWICE A DAY BY MOUTH (Patient taking differently: Take 1 tablet (25 mg) by mouth once daily.), Disp: 90 tablet, Rfl: 1    Jardiance 25 mg, TAKE 1 TABLET BY MOUTH EVERY DAY, Disp: 90 tablet, Rfl: 1    lisinopril 5 mg tablet, TAKE 1 TABLET BY MOUTH EVERY DAY, Disp: 90 tablet, Rfl: 1    montelukast (Singulair) 10 mg tablet, TAKE 1 TABLET BY MOUTH EVERYDAY AT BEDTIME (Patient taking differently: Take 1 tablet (10 mg) by mouth once daily at bedtime.), Disp: 90 tablet, Rfl: 1    NIFEdipine XL 60 mg 24 hr tablet, TAKE 1 TABLET BY MOUTH EVERY DAY, Disp: 90 tablet, Rfl: 1    OneTouch Delica Plus Lancet 33 gauge misc, 2 EACH 2 TIMES A DAY., Disp: 200 each, Rfl: 1    pioglitazone (Actos) 45 mg tablet, TAKE 1 TABLET BY MOUTH EVERY DAY (Patient taking differently: Take 1 tablet (45 mg) by mouth once daily.), Disp: 90 tablet, Rfl: 1    solifenacin (VESIcare) 10 mg tablet, TAKE 1 TABLET (10 MG) BY MOUTH ONCE DAILY. SWALLOW TABLET WHOLE DO NOT CRUSH, CHEW, OR SPLIT., Disp: 90 tablet, Rfl: 1    sotalol  mg tablet, TAKE 1 TABLET BY MOUTH TWICE  A DAY, Disp: 180 tablet, Rfl: 1    Xarelto 20 mg tablet, TAKE 1 TABLET BY MOUTH EVERY DAY (Patient taking differently: Take 1 tablet (20 mg) by mouth once daily.), Disp: 90 tablet, Rfl: 1    cyclobenzaprine (Flexeril) 10 mg tablet, Take 1 tablet (10 mg) by mouth 3 times a day as needed for muscle spasms for up to 7 days., Disp: 21 tablet, Rfl: 0    nitroglycerin (Nitrostat) 0.4 mg SL tablet, Place 1 tablet (0.4 mg) under the tongue every 5 minutes if needed for chest pain., Disp: , Rfl:       Vitals:    02/22/24 1004   BP: 110/64   Pulse: (!) 47   Resp: 14     Objective   Aox3  PERRL, EOMI   5/5 x 4   Incision well healed     Relevant Results    No new imaging    Assessment and Plan:   Francis Vang is a very nice 78 y.o. year old patient s/p L2-4 lami here for a post-op visit. The patient is doing very well with resolution of his left radicular symptoms. He still has some incisional pain but is no longer taking any pain medications.     He will restart PT at this time.    We will see the patient again in 3 months.      Jessica Laureano MD    of Neurosurgery   [facility] [place]   [facility]   Office: [phone]  Fax: [phone]

## 2024-03-04 ENCOUNTER — OFFICE VISIT (OUTPATIENT)
Dept: PRIMARY CARE | Facility: CLINIC | Age: 79
End: 2024-03-04
Payer: MEDICARE

## 2024-03-04 VITALS
HEART RATE: 54 BPM | OXYGEN SATURATION: 96 % | DIASTOLIC BLOOD PRESSURE: 62 MMHG | WEIGHT: 216 LBS | BODY MASS INDEX: 28.5 KG/M2 | SYSTOLIC BLOOD PRESSURE: 122 MMHG

## 2024-03-04 DIAGNOSIS — R53.83 FATIGUE, UNSPECIFIED TYPE: ICD-10-CM

## 2024-03-04 DIAGNOSIS — E78.2 MIXED HYPERLIPIDEMIA: ICD-10-CM

## 2024-03-04 DIAGNOSIS — G83.10 PARESIS OF SINGLE LOWER EXTREMITY (MULTI): ICD-10-CM

## 2024-03-04 DIAGNOSIS — E78.5 DM TYPE 2 WITH DIABETIC DYSLIPIDEMIA (MULTI): ICD-10-CM

## 2024-03-04 DIAGNOSIS — J45.901 MODERATE ASTHMA WITH ACUTE EXACERBATION, UNSPECIFIED WHETHER PERSISTENT (HHS-HCC): ICD-10-CM

## 2024-03-04 DIAGNOSIS — E55.9 VITAMIN D DEFICIENCY: ICD-10-CM

## 2024-03-04 DIAGNOSIS — I28.8 OTHER DISEASES OF PULMONARY VESSELS (MULTI): ICD-10-CM

## 2024-03-04 DIAGNOSIS — E11.69 DM TYPE 2 WITH DIABETIC DYSLIPIDEMIA (MULTI): ICD-10-CM

## 2024-03-04 DIAGNOSIS — N18.32 STAGE 3B CHRONIC KIDNEY DISEASE (MULTI): ICD-10-CM

## 2024-03-04 DIAGNOSIS — I82.5Y9 CHRONIC DEEP VEIN THROMBOSIS (DVT) OF PROXIMAL VEIN OF LOWER EXTREMITY, UNSPECIFIED LATERALITY (MULTI): Primary | ICD-10-CM

## 2024-03-04 PROCEDURE — 99214 OFFICE O/P EST MOD 30 MIN: CPT | Performed by: FAMILY MEDICINE

## 2024-03-04 RX ORDER — FLUTICASONE FUROATE, UMECLIDINIUM BROMIDE AND VILANTEROL TRIFENATATE 200; 62.5; 25 UG/1; UG/1; UG/1
1 POWDER RESPIRATORY (INHALATION)
Qty: 1 EACH | Refills: 1
Start: 2024-03-04 | End: 2024-04-03 | Stop reason: SDUPTHER

## 2024-03-04 NOTE — PROGRESS NOTES
Subjective   Patient ID: Francis Vang is a 78 y.o. male who presents for Follow-up.    HPI Patient is following up after back surgery done on 1/31/24 with Dr. Laureano.   Back is improving. He does have some pain at the incision site and is unable to stand a long time.  Leg weakness is improving in the last few day.    Review of Systems    12 Systems have been reviewed as follows.   Constitutional: Fever, weight gain, weight loss, appetite change, night sweats, fatigue, chills.  Eyes : blurry, double vision, vision, loss, tearing, redness, pain, sensitivity to light, glaucoma.  Ears, nose, mouth, and throat: Hearing loss, ringing in the ears, ear pain, nasal congestion, nasal drainage, nosebleeds, mouth, throat, irritation tooth problem.  Cardiovascular :chest pain, pressure, heart racing, palpitations, sweating, leg swelling, high or low blood pressure  Pulmonary: Cough, yellow or green sputum, blood and sputum, shortness of breath, wheezing  Gastrointestinal: Nausea, vomiting, diarrhea, constipation, pain, blood in stool, or vomitus, heartburn, difficulty swallowing  Genitourinary: incontinence, abnormal bleeding, abnormal discharge, urinary frequency, urinary hesitancy, pain, impotence sexual problem, infection, urinary retention  Musculoskeletal: Pain, stiffness, joint, redness or warmth, arthritis, back pain, weakness, muscle wasting, sprain or fracture  Neuro: Weight weakness, dizziness, change in voice, change in taste change in vision, change in hearing, loss, or change of sensation, trouble walking, balance problems coordination problems, shaking, speech problem  Endocrine , cold or heat intolerance, blood sugar problem, weight gain or loss missed periods hot flashes, sweats, change in body hair, change in libido, increased thirst, increased urination  Heme/lymph: Swelling, bleeding, problem anemia, bruising, enlarged lymph nodes  Allergic/immunologic: H. plus nasal drip, watery itchy eyes, nasal drainage,  immunosuppressed  The above were reviewed and noted negative except as noted in HPI and Problem List.      Objective   /62   Pulse 54   Wt 98 kg (216 lb)   SpO2 96%   BMI 28.50 kg/m²     Physical Exam  Constitutional: Well developed, well nourished, alert and in no acute distress  Eyes: Normal external exam. Pupils equally round and reactive to light with normal accommodation and extraocular movements intact.  Neck: Supple, no lymphadenopathy or masses.  Cardiovascular: Regular rate and rhythm, normal S1 and S2, no murmurs, gallops, or rubs. Radial pulses normal. No peripheral edema.  Abdomen: soft, non tender, distended, no masses or HSM.  Pulmonary: No respiratory distress, lungs clear to auscultation bilaterally. No wheezes, rhonchi, rales.  Skin: Warm, well perfused, normal skin turgor and color.  Neurologic: Cranial nerves II-XII grossly intact.  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    Relevant Orders    Hemoglobin A1C    Follow Up In [facility] - PCP - Established    Fatigue R53.83    Relevant Orders    Thyroid Stimulating Hormone    Follow Up In [facility] - PCP - Established    Hyperlipidemia E78.5    Relevant Orders    CBC and Auto Differential    Comprehensive Metabolic Panel    Lipid Panel    Follow Up In [facility] - PCP - Established    Vitamin D deficiency E55.9    Relevant Orders    Vitamin D 25-Hydroxy,Total (for eval of Vitamin D levels)    Follow Up In [facility] - PCP - Established    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   Right leg weakness improving     Patient does not feel breztri helped symptoms     Consider CT A & P next     Urine & bowel incont     Improved with vesicare & proscar and surgery     Could not tolerate metformin       PSA q 6 months      Blood work prior to next visit.     Nuclear stress wnl 10/2022     Scribe Attestation  By signing my name below, I, Jose Manuel Estrada MD  , Scribe attest that this documentation has been prepared under the direction and in the presence of Jose Manuel Estrada MD.  Provider Attestation - Scribe documentation    All medical record entries made by the Scribe were at my direction and personally dictated by me. I have reviewed the chart and agree that the record accurately reflects my personal performance of the history, physical exam, discussion and plan.

## 2024-03-21 ENCOUNTER — APPOINTMENT (OUTPATIENT)
Dept: NEUROSURGERY | Facility: CLINIC | Age: 79
End: 2024-03-21
Payer: MEDICARE

## 2024-03-25 DIAGNOSIS — E11.69 TYPE 2 DIABETES MELLITUS WITH OTHER SPECIFIED COMPLICATION (MULTI): ICD-10-CM

## 2024-03-25 DIAGNOSIS — E78.5 HYPERLIPIDEMIA, UNSPECIFIED: ICD-10-CM

## 2024-03-25 RX ORDER — PIOGLITAZONEHYDROCHLORIDE 45 MG/1
TABLET ORAL
Qty: 90 TABLET | Refills: 1 | Status: SHIPPED | OUTPATIENT
Start: 2024-03-25

## 2024-03-25 RX ORDER — EMPAGLIFLOZIN 25 MG/1
25 TABLET, FILM COATED ORAL DAILY
Qty: 90 TABLET | Refills: 1 | Status: SHIPPED | OUTPATIENT
Start: 2024-03-25

## 2024-04-02 ENCOUNTER — LAB (OUTPATIENT)
Dept: LAB | Facility: LAB | Age: 79
End: 2024-04-02
Payer: MEDICARE

## 2024-04-02 DIAGNOSIS — R53.83 FATIGUE, UNSPECIFIED TYPE: ICD-10-CM

## 2024-04-02 DIAGNOSIS — E55.9 VITAMIN D DEFICIENCY: ICD-10-CM

## 2024-04-02 DIAGNOSIS — E78.2 MIXED HYPERLIPIDEMIA: ICD-10-CM

## 2024-04-02 DIAGNOSIS — E11.69 DM TYPE 2 WITH DIABETIC DYSLIPIDEMIA (MULTI): ICD-10-CM

## 2024-04-02 DIAGNOSIS — E78.5 DM TYPE 2 WITH DIABETIC DYSLIPIDEMIA (MULTI): ICD-10-CM

## 2024-04-02 LAB
25(OH)D3 SERPL-MCNC: 23 NG/ML (ref 30–100)
ALBUMIN SERPL BCP-MCNC: 3.7 G/DL (ref 3.4–5)
ALP SERPL-CCNC: 88 U/L (ref 33–136)
ALT SERPL W P-5'-P-CCNC: 8 U/L (ref 10–52)
ANION GAP SERPL CALC-SCNC: 11 MMOL/L (ref 10–20)
AST SERPL W P-5'-P-CCNC: 12 U/L (ref 9–39)
BASOPHILS # BLD AUTO: 0.08 X10*3/UL (ref 0–0.1)
BASOPHILS NFR BLD AUTO: 1.3 %
BILIRUB SERPL-MCNC: 0.5 MG/DL (ref 0–1.2)
BUN SERPL-MCNC: 35 MG/DL (ref 6–23)
CALCIUM SERPL-MCNC: 9.3 MG/DL (ref 8.6–10.3)
CHLORIDE SERPL-SCNC: 104 MMOL/L (ref 98–107)
CHOLEST SERPL-MCNC: 105 MG/DL (ref 0–199)
CHOLESTEROL/HDL RATIO: 3.1
CO2 SERPL-SCNC: 29 MMOL/L (ref 21–32)
CREAT SERPL-MCNC: 1.68 MG/DL (ref 0.5–1.3)
EGFRCR SERPLBLD CKD-EPI 2021: 42 ML/MIN/1.73M*2
EOSINOPHIL # BLD AUTO: 0.29 X10*3/UL (ref 0–0.4)
EOSINOPHIL NFR BLD AUTO: 4.5 %
ERYTHROCYTE [DISTWIDTH] IN BLOOD BY AUTOMATED COUNT: 16.3 % (ref 11.5–14.5)
EST. AVERAGE GLUCOSE BLD GHB EST-MCNC: 163 MG/DL
GLUCOSE SERPL-MCNC: 129 MG/DL (ref 74–99)
HBA1C MFR BLD: 7.3 %
HCT VFR BLD AUTO: 36.4 % (ref 41–52)
HDLC SERPL-MCNC: 33.5 MG/DL
HGB BLD-MCNC: 11.8 G/DL (ref 13.5–17.5)
IMM GRANULOCYTES # BLD AUTO: 0.02 X10*3/UL (ref 0–0.5)
IMM GRANULOCYTES NFR BLD AUTO: 0.3 % (ref 0–0.9)
LDLC SERPL CALC-MCNC: 54 MG/DL
LYMPHOCYTES # BLD AUTO: 1.9 X10*3/UL (ref 0.8–3)
LYMPHOCYTES NFR BLD AUTO: 29.8 %
MCH RBC QN AUTO: 31.6 PG (ref 26–34)
MCHC RBC AUTO-ENTMCNC: 32.4 G/DL (ref 32–36)
MCV RBC AUTO: 98 FL (ref 80–100)
MONOCYTES # BLD AUTO: 1.07 X10*3/UL (ref 0.05–0.8)
MONOCYTES NFR BLD AUTO: 16.8 %
NEUTROPHILS # BLD AUTO: 3.02 X10*3/UL (ref 1.6–5.5)
NEUTROPHILS NFR BLD AUTO: 47.3 %
NON HDL CHOLESTEROL: 72 MG/DL (ref 0–149)
NRBC BLD-RTO: 0 /100 WBCS (ref 0–0)
PLATELET # BLD AUTO: 209 X10*3/UL (ref 150–450)
POTASSIUM SERPL-SCNC: 3.8 MMOL/L (ref 3.5–5.3)
PROT SERPL-MCNC: 6.4 G/DL (ref 6.4–8.2)
RBC # BLD AUTO: 3.73 X10*6/UL (ref 4.5–5.9)
SODIUM SERPL-SCNC: 140 MMOL/L (ref 136–145)
TRIGL SERPL-MCNC: 89 MG/DL (ref 0–149)
TSH SERPL-ACNC: 1.98 MIU/L (ref 0.44–3.98)
VLDL: 18 MG/DL (ref 0–40)
WBC # BLD AUTO: 6.4 X10*3/UL (ref 4.4–11.3)

## 2024-04-02 PROCEDURE — 85025 COMPLETE CBC W/AUTO DIFF WBC: CPT

## 2024-04-02 PROCEDURE — 36415 COLL VENOUS BLD VENIPUNCTURE: CPT

## 2024-04-02 PROCEDURE — 80053 COMPREHEN METABOLIC PANEL: CPT

## 2024-04-02 PROCEDURE — 80061 LIPID PANEL: CPT

## 2024-04-02 PROCEDURE — 82306 VITAMIN D 25 HYDROXY: CPT

## 2024-04-02 PROCEDURE — 84443 ASSAY THYROID STIM HORMONE: CPT

## 2024-04-02 PROCEDURE — 83036 HEMOGLOBIN GLYCOSYLATED A1C: CPT

## 2024-04-03 ENCOUNTER — OFFICE VISIT (OUTPATIENT)
Dept: PRIMARY CARE | Facility: CLINIC | Age: 79
End: 2024-04-03
Payer: MEDICARE

## 2024-04-03 VITALS
DIASTOLIC BLOOD PRESSURE: 52 MMHG | OXYGEN SATURATION: 95 % | RESPIRATION RATE: 16 BRPM | SYSTOLIC BLOOD PRESSURE: 112 MMHG | WEIGHT: 218.6 LBS | HEART RATE: 42 BPM | BODY MASS INDEX: 28.97 KG/M2 | HEIGHT: 73 IN

## 2024-04-03 DIAGNOSIS — R53.83 FATIGUE, UNSPECIFIED TYPE: ICD-10-CM

## 2024-04-03 DIAGNOSIS — Z12.5 SCREENING FOR PROSTATE CANCER: Primary | ICD-10-CM

## 2024-04-03 DIAGNOSIS — E78.2 MIXED HYPERLIPIDEMIA: ICD-10-CM

## 2024-04-03 DIAGNOSIS — E11.69 DM TYPE 2 WITH DIABETIC DYSLIPIDEMIA (MULTI): ICD-10-CM

## 2024-04-03 DIAGNOSIS — E78.5 DM TYPE 2 WITH DIABETIC DYSLIPIDEMIA (MULTI): ICD-10-CM

## 2024-04-03 DIAGNOSIS — J45.901 MODERATE ASTHMA WITH ACUTE EXACERBATION, UNSPECIFIED WHETHER PERSISTENT (HHS-HCC): ICD-10-CM

## 2024-04-03 DIAGNOSIS — E55.9 VITAMIN D DEFICIENCY: ICD-10-CM

## 2024-04-03 PROCEDURE — 99214 OFFICE O/P EST MOD 30 MIN: CPT | Performed by: FAMILY MEDICINE

## 2024-04-03 RX ORDER — FLUTICASONE FUROATE, UMECLIDINIUM BROMIDE AND VILANTEROL TRIFENATATE 200; 62.5; 25 UG/1; UG/1; UG/1
1 POWDER RESPIRATORY (INHALATION)
Qty: 3 EACH | Refills: 1 | Status: SHIPPED | OUTPATIENT
Start: 2024-04-03

## 2024-04-03 NOTE — PROGRESS NOTES
Subjective   Patient ID: Francis Vang is a 78 y.o. male who presents for Back Pain.    HPI   Patient is following up on back pain and leg weakness. Patient is still having pain and the knees down to feet are very rubbery and weak.     He has had a few dizzy spells at home. He had a position change from sit to stand and was very weak and began a headache after the spell.    Patient would like to review blood work from 4/2/24.    Trelegy inhaler is not helping the SOB.He is having trouble with SOB and breathing hard with exertion.    Review of Systems  12 Systems have been reviewed as follows.  Constitutional: Fever, weight gain, weight loss, appetite change, night sweats, fatigue, chills.  Eyes : blurry, double vision, vision, loss, tearing, redness, pain, sensitivity to light, glaucoma.  Ears, nose, mouth, and throat: Hearing loss, ringing in the ears, ear pain, nasal congestion, nasal drainage, nosebleeds, mouth, throat, irritation tooth problem.  Cardiovascular :chest pain, pressure, heart racing, palpitations, sweating, leg swelling, high or low blood pressure  Pulmonary: Cough, yellow or green sputum, blood and sputum, shortness of breath, wheezing  Gastrointestinal: Nausea, vomiting, diarrhea, constipation, pain, blood in stool, or vomitus, heartburn, difficulty swallowing  Genitourinary: incontinence, abnormal bleeding, abnormal discharge, urinary frequency, urinary hesitancy, pain, impotence sexual problem, infection, urinary retention  Musculoskeletal: Pain, stiffness, joint, redness or warmth, arthritis, back pain, weakness, muscle wasting, sprain or fracture  Neuro: Weight weakness, dizziness, change in voice, change in taste change in vision, change in hearing, loss, or change of sensation, trouble walking, balance problems coordination problems, shaking, speech problem  Endocrine , cold or heat intolerance, blood sugar problem, weight gain or loss missed periods hot flashes, sweats, change in body  "hair, change in libido, increased thirst, increased urination  Heme/lymph: Swelling, bleeding, problem anemia, bruising, enlarged lymph nodes  Allergic/immunologic: H. plus nasal drip, watery itchy eyes, nasal drainage, immunosuppressed  The above were reviewed and noted negative except as noted in HPI and Problem List.      Objective   /52 (BP Location: Left arm, Patient Position: Sitting, BP Cuff Size: Adult)   Pulse (!) 42   Resp 16   Ht 1.854 m (6' 1\")   Wt 99.2 kg (218 lb 9.6 oz)   SpO2 95%   BMI 28.84 kg/m²     Physical Exam  Constitutional: Well developed, well nourished, alert and in no acute distress  Eyes: Normal external exam. Pupils equally round and reactive to light with normal accommodation and extraocular movements intact.  Neck: Supple, no lymphadenopathy or masses.  Cardiovascular: Regular rate and rhythm, normal S1 and S2, no murmurs, gallops, or rubs. Radial pulses normal. No peripheral edema.  Abdomen: soft, non tender, distended, no masses or HSM.  Pulmonary: No respiratory distress, lungs clear to auscultation bilaterally. No wheezes, rhonchi, rales.  Skin: Warm, well perfused, normal skin turgor and color.  Neurologic: Cranial nerves II-XII grossly intact.  Psychiatric: Mood calm and affect normal  Musculoskeletal: Moving all extremities without restriction    Assessment/Plan   Problem List Items Addressed This Visit             ICD-10-CM    Asthma J45.909    Relevant Medications    fluticasone-umeclidin-vilanter (Trelegy Ellipta) 200-62.5-25 mcg blister with device    Other Relevant Orders    Follow Up In [facility] - PCP - Established    Follow Up In [facility] - Pharmacy    DM type 2 with diabetic dyslipidemia (CMS/[facility]) E11.69, E78.5    Relevant Orders    Follow Up In [facility] - PCP - Established    Follow Up In [facility] - Pharmacy    Fatigue R53.83    Relevant Orders    Follow Up In [facility] - PCP - Established    " Follow Up In [facility] - Pharmacy    Hyperlipidemia E78.5    Relevant Orders    Follow Up [facility] - PCP - Established    Follow Up In [facility] - Pharmacy    Vitamin D deficiency E55.9    Relevant Orders    Follow Up [facility] - PCP - Established    Follow Up In [facility] - Pharmacy     Scribe Attestation  By signing my name below, I, Jose Manuel Estrada MD , Scribe   attest that this documentation has been prepared under the direction and in the presence of Jose Manuel Estrada MD.    Provider Attestation - Scribe documentation    All medical record entries made by the Scribe were at my direction and personally dictated by me. I have reviewed the chart and agree that the record accurately reflects my personal performance of the history, physical exam, discussion and plan.    surgery on back scheduled 1/31/24 with ..   Right leg weakness improving     Patient does not feel breztri helped symptoms   patient is taking Trelegy     Consider CT A & P next     Urine & bowel incont     Improved with vesicare & proscar and surgery     Could not tolerate metformin       PSA q 6 months        Nuclear stress wnl 10/2022     Consider ozempic in future

## 2024-04-05 DIAGNOSIS — E53.8 DEFICIENCY OF OTHER SPECIFIED B GROUP VITAMINS: ICD-10-CM

## 2024-04-05 DIAGNOSIS — I10 ESSENTIAL (PRIMARY) HYPERTENSION: ICD-10-CM

## 2024-04-05 RX ORDER — NIFEDIPINE 60 MG/1
60 TABLET, EXTENDED RELEASE ORAL DAILY
Qty: 90 TABLET | Refills: 1 | Status: SHIPPED | OUTPATIENT
Start: 2024-04-05

## 2024-04-05 RX ORDER — LANOLIN ALCOHOL/MO/W.PET/CERES
1000 CREAM (GRAM) TOPICAL DAILY
Qty: 100 TABLET | Refills: 1 | Status: SHIPPED | OUTPATIENT
Start: 2024-04-05

## 2024-04-05 RX ORDER — SOTALOL HYDROCHLORIDE 120 MG/1
120 TABLET ORAL 2 TIMES DAILY
Qty: 180 TABLET | Refills: 1 | Status: SHIPPED | OUTPATIENT
Start: 2024-04-05

## 2024-04-27 DIAGNOSIS — Z79.01 LONG TERM (CURRENT) USE OF ANTICOAGULANTS: ICD-10-CM

## 2024-04-27 DIAGNOSIS — R39.14 BENIGN PROSTATIC HYPERPLASIA WITH INCOMPLETE BLADDER EMPTYING: ICD-10-CM

## 2024-04-27 DIAGNOSIS — N40.1 BENIGN PROSTATIC HYPERPLASIA WITH INCOMPLETE BLADDER EMPTYING: ICD-10-CM

## 2024-04-30 ENCOUNTER — TELEPHONE (OUTPATIENT)
Dept: PRIMARY CARE | Facility: CLINIC | Age: 79
End: 2024-04-30
Payer: MEDICARE

## 2024-04-30 DIAGNOSIS — E78.5 HYPERLIPIDEMIA, UNSPECIFIED: ICD-10-CM

## 2024-04-30 DIAGNOSIS — E11.9 DIABETES MELLITUS TYPE 2 WITHOUT RETINOPATHY (MULTI): Primary | ICD-10-CM

## 2024-04-30 RX ORDER — RIVAROXABAN 20 MG/1
TABLET, FILM COATED ORAL
Qty: 90 TABLET | Refills: 1 | Status: SHIPPED | OUTPATIENT
Start: 2024-04-30

## 2024-04-30 RX ORDER — ATORVASTATIN CALCIUM 10 MG/1
10 TABLET, FILM COATED ORAL DAILY
Qty: 90 TABLET | Refills: 1 | OUTPATIENT
Start: 2024-04-30

## 2024-04-30 RX ORDER — FINASTERIDE 5 MG/1
5 TABLET, FILM COATED ORAL DAILY
Qty: 90 TABLET | Refills: 1 | Status: SHIPPED | OUTPATIENT
Start: 2024-04-30 | End: 2025-04-30

## 2024-04-30 NOTE — TELEPHONE ENCOUNTER
Requests:  atorvastatin (Lipitor) 10 mg tablet  OneTouch Verio test strips     Sent to:  [facility] on [facility].

## 2024-05-01 RX ORDER — ATORVASTATIN CALCIUM 10 MG/1
10 TABLET, FILM COATED ORAL DAILY
Qty: 90 TABLET | Refills: 1 | Status: SHIPPED | OUTPATIENT
Start: 2024-05-01

## 2024-05-06 DIAGNOSIS — M10.9 GOUT, UNSPECIFIED: ICD-10-CM

## 2024-05-06 DIAGNOSIS — J30.9 ALLERGIC RHINITIS, UNSPECIFIED: ICD-10-CM

## 2024-05-06 RX ORDER — MONTELUKAST SODIUM 10 MG/1
TABLET ORAL
Qty: 90 TABLET | Refills: 1 | Status: SHIPPED | OUTPATIENT
Start: 2024-05-06

## 2024-05-06 RX ORDER — ALLOPURINOL 300 MG/1
TABLET ORAL
Qty: 90 TABLET | Refills: 1 | Status: SHIPPED | OUTPATIENT
Start: 2024-05-06

## 2024-05-16 ENCOUNTER — OFFICE VISIT (OUTPATIENT)
Dept: NEUROSURGERY | Facility: CLINIC | Age: 79
End: 2024-05-16
Payer: MEDICARE

## 2024-05-16 VITALS
HEIGHT: 73 IN | DIASTOLIC BLOOD PRESSURE: 64 MMHG | BODY MASS INDEX: 29.4 KG/M2 | SYSTOLIC BLOOD PRESSURE: 130 MMHG | WEIGHT: 221.8 LBS | HEART RATE: 48 BPM

## 2024-05-16 DIAGNOSIS — Z98.890 HISTORY OF EXCISION OF LAMINA OF LUMBAR VERTEBRA FOR DECOMPRESSION OF SPINAL CORD: Primary | ICD-10-CM

## 2024-05-16 PROCEDURE — 1159F MED LIST DOCD IN RCRD: CPT | Performed by: STUDENT IN AN ORGANIZED HEALTH CARE EDUCATION/TRAINING PROGRAM

## 2024-05-16 PROCEDURE — 99024 POSTOP FOLLOW-UP VISIT: CPT | Performed by: STUDENT IN AN ORGANIZED HEALTH CARE EDUCATION/TRAINING PROGRAM

## 2024-05-16 PROCEDURE — 1036F TOBACCO NON-USER: CPT | Performed by: STUDENT IN AN ORGANIZED HEALTH CARE EDUCATION/TRAINING PROGRAM

## 2024-05-16 PROCEDURE — 3075F SYST BP GE 130 - 139MM HG: CPT | Performed by: STUDENT IN AN ORGANIZED HEALTH CARE EDUCATION/TRAINING PROGRAM

## 2024-05-16 PROCEDURE — 1126F AMNT PAIN NOTED NONE PRSNT: CPT | Performed by: STUDENT IN AN ORGANIZED HEALTH CARE EDUCATION/TRAINING PROGRAM

## 2024-05-16 PROCEDURE — 1160F RVW MEDS BY RX/DR IN RCRD: CPT | Performed by: STUDENT IN AN ORGANIZED HEALTH CARE EDUCATION/TRAINING PROGRAM

## 2024-05-16 PROCEDURE — 3078F DIAST BP <80 MM HG: CPT | Performed by: STUDENT IN AN ORGANIZED HEALTH CARE EDUCATION/TRAINING PROGRAM

## 2024-05-16 ASSESSMENT — PAIN SCALES - GENERAL: PAINLEVEL: 0-NO PAIN

## 2024-05-16 NOTE — PROGRESS NOTES
Francis Vang is a 78 y.o. year old male s/p lumbar laminectomy presenting today for a follow up.    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    OTHER SURGICAL HISTORY  05/10/2019    Cholecystectomy    OTHER SURGICAL HISTORY      x3 back surgeries           Current Outpatient Medications:     allopurinol (Zyloprim) 300 mg tablet, TAKE 1 TABLET BY MOUTH EVERY DAY, Disp: 90 tablet, Rfl: 1    atorvastatin (Lipitor) 10 mg tablet, Take 1 tablet (10 mg) by mouth once daily., Disp: 90 tablet, Rfl: 1    blood sugar diagnostic strip, 1 strip 4 times a day., Disp: 100 strip, Rfl: 2    blood-glucose meter misc, Needed for Glucose reading, Disp: 1 each, Rfl: 0    cyanocobalamin (Vitamin B-12) 1,000 mcg tablet, TAKE 1 TABLET BY MOUTH EVERY DAY, Disp: 100 tablet, Rfl: 1    cyclobenzaprine (Flexeril) 10 mg tablet, Take 1 tablet (10 mg) by mouth 3 times a day as needed for muscle spasms for up to 7 days. (Patient not taking: Reported on 4/3/2024), Disp: 21 tablet, Rfl: 0    finasteride (Proscar) 5 mg tablet, TAKE 1 TABLET (5 MG) BY MOUTH ONCE DAILY. DO NOT CRUSH, CHEW, OR SPLIT., Disp: 90 tablet, Rfl: 1    fluticasone-umeclidin-vilanter (Trelegy Ellipta) 200-62.5-25 mcg blister with device, Inhale 1 puff once daily in the morning. Take before meals., Disp: 3 each, Rfl: 1    hydroCHLOROthiazide (HYDRODiuril) 25 mg tablet, TAKE 1/2 TABLET TWICE A DAY BY MOUTH (Patient taking differently: Take 1 tablet (25 mg) by mouth once daily.), Disp: 90 tablet, Rfl: 1    Jardiance 25 mg, TAKE 1 TABLET BY MOUTH EVERY DAY, Disp: 90 tablet, Rfl: 1    lisinopril 5 mg tablet, TAKE 1 TABLET BY MOUTH EVERY DAY, Disp: 90 tablet, Rfl: 1    montelukast (Singulair) 10 mg tablet, TAKE 1 TABLET BY MOUTH EVERYDAY AT BEDTIME, Disp: 90 tablet, Rfl: 1    NIFEdipine XL 60 mg 24 hr tablet, TAKE 1 TABLET BY MOUTH EVERY DAY, Disp: 90 tablet, Rfl: 1    nitroglycerin (Nitrostat) 0.4 mg  SL tablet, Place 1 tablet (0.4 mg) under the tongue every 5 minutes if needed for chest pain., Disp: , Rfl:     OneTouch Delica Plus Lancet 33 gauge misc, 2 EACH 2 TIMES A DAY., Disp: 200 each, Rfl: 1    pioglitazone (Actos) 45 mg tablet, TAKE 1 TABLET BY MOUTH EVERY DAY, Disp: 90 tablet, Rfl: 1    solifenacin (VESIcare) 10 mg tablet, TAKE 1 TABLET (10 MG) BY MOUTH ONCE DAILY. SWALLOW TABLET WHOLE DO NOT CRUSH, CHEW, OR SPLIT., Disp: 90 tablet, Rfl: 0    sotalol  mg tablet, TAKE 1 TABLET BY MOUTH TWICE A DAY, Disp: 180 tablet, Rfl: 1    Xarelto 20 mg tablet, TAKE 1 TABLET BY MOUTH EVERY DAY, Disp: 90 tablet, Rfl: 1      There were no vitals filed for this visit.  Objective   Aox3  PERRL, EOMI   5/5 x 4   Incision well healed     Relevant Results    No new imaging    Assessment and Plan:   Francis Vang is a very nice 78 y.o. year old patient s/p L2-4 lami here for a post-op visit. The patient is doing well from a post-op spine perspective with no more shooting pain down his leg. However, he still has some back pain when doing chores that require standing for a long time. He denies any leg symptoms. He states that part of his pain is due to his persistent SOB. He has been seen by a pulmonologist and PCP for this chronic issue with no current resolution. I encouraged him to continue working out and will see him in 1 year.      Jessica Laureano MD    of Neurosurgery   [facility]   [facility]   Office: [phone]  Fax: [phone]      Scribe Attestation  By signing my name below, I, Yuliya Espinoza , [place]rib[place]   attest that this documentation has been prepared under the direction and in the presence of Jessica Laureano MD.

## 2024-05-26 DIAGNOSIS — I10 ESSENTIAL (PRIMARY) HYPERTENSION: ICD-10-CM

## 2024-05-29 RX ORDER — LISINOPRIL 5 MG/1
5 TABLET ORAL DAILY
Qty: 90 TABLET | Refills: 1 | Status: SHIPPED | OUTPATIENT
Start: 2024-05-29

## 2024-07-02 ENCOUNTER — LAB (OUTPATIENT)
Dept: LAB | Facility: LAB | Age: 79
End: 2024-07-02
Payer: MEDICARE

## 2024-07-02 DIAGNOSIS — E55.9 VITAMIN D DEFICIENCY: ICD-10-CM

## 2024-07-02 DIAGNOSIS — R53.83 FATIGUE, UNSPECIFIED TYPE: ICD-10-CM

## 2024-07-02 DIAGNOSIS — E78.5 DM TYPE 2 WITH DIABETIC DYSLIPIDEMIA (MULTI): ICD-10-CM

## 2024-07-02 DIAGNOSIS — Z12.5 SCREENING FOR PROSTATE CANCER: ICD-10-CM

## 2024-07-02 DIAGNOSIS — E78.2 MIXED HYPERLIPIDEMIA: ICD-10-CM

## 2024-07-02 DIAGNOSIS — E11.69 DM TYPE 2 WITH DIABETIC DYSLIPIDEMIA (MULTI): ICD-10-CM

## 2024-07-02 LAB
25(OH)D3 SERPL-MCNC: 37 NG/ML (ref 30–100)
ALBUMIN SERPL BCP-MCNC: 3.7 G/DL (ref 3.4–5)
ALP SERPL-CCNC: 76 U/L (ref 33–136)
ALT SERPL W P-5'-P-CCNC: 10 U/L (ref 10–52)
ANION GAP SERPL CALC-SCNC: 10 MMOL/L (ref 10–20)
AST SERPL W P-5'-P-CCNC: 12 U/L (ref 9–39)
BASOPHILS # BLD AUTO: 0.07 X10*3/UL (ref 0–0.1)
BASOPHILS NFR BLD AUTO: 1.2 %
BILIRUB SERPL-MCNC: 0.5 MG/DL (ref 0–1.2)
BUN SERPL-MCNC: 28 MG/DL (ref 6–23)
CALCIUM SERPL-MCNC: 9.1 MG/DL (ref 8.6–10.3)
CHLORIDE SERPL-SCNC: 103 MMOL/L (ref 98–107)
CHOLEST SERPL-MCNC: 121 MG/DL (ref 0–199)
CHOLESTEROL/HDL RATIO: 3.3
CO2 SERPL-SCNC: 31 MMOL/L (ref 21–32)
CREAT SERPL-MCNC: 1.71 MG/DL (ref 0.5–1.3)
EGFRCR SERPLBLD CKD-EPI 2021: 41 ML/MIN/1.73M*2
EOSINOPHIL # BLD AUTO: 0.26 X10*3/UL (ref 0–0.4)
EOSINOPHIL NFR BLD AUTO: 4.6 %
ERYTHROCYTE [DISTWIDTH] IN BLOOD BY AUTOMATED COUNT: 17.1 % (ref 11.5–14.5)
EST. AVERAGE GLUCOSE BLD GHB EST-MCNC: 171 MG/DL
GLUCOSE SERPL-MCNC: 133 MG/DL (ref 74–99)
HBA1C MFR BLD: 7.6 %
HCT VFR BLD AUTO: 38.9 % (ref 41–52)
HDLC SERPL-MCNC: 36.6 MG/DL
HGB BLD-MCNC: 12.6 G/DL (ref 13.5–17.5)
IMM GRANULOCYTES # BLD AUTO: 0.02 X10*3/UL (ref 0–0.5)
IMM GRANULOCYTES NFR BLD AUTO: 0.4 % (ref 0–0.9)
LDLC SERPL CALC-MCNC: 70 MG/DL
LYMPHOCYTES # BLD AUTO: 1.91 X10*3/UL (ref 0.8–3)
LYMPHOCYTES NFR BLD AUTO: 33.8 %
MCH RBC QN AUTO: 31.7 PG (ref 26–34)
MCHC RBC AUTO-ENTMCNC: 32.4 G/DL (ref 32–36)
MCV RBC AUTO: 98 FL (ref 80–100)
MONOCYTES # BLD AUTO: 0.89 X10*3/UL (ref 0.05–0.8)
MONOCYTES NFR BLD AUTO: 15.8 %
NEUTROPHILS # BLD AUTO: 2.5 X10*3/UL (ref 1.6–5.5)
NEUTROPHILS NFR BLD AUTO: 44.2 %
NON HDL CHOLESTEROL: 84 MG/DL (ref 0–149)
NRBC BLD-RTO: 0 /100 WBCS (ref 0–0)
PLATELET # BLD AUTO: 232 X10*3/UL (ref 150–450)
POTASSIUM SERPL-SCNC: 4.1 MMOL/L (ref 3.5–5.3)
PROT SERPL-MCNC: 6.4 G/DL (ref 6.4–8.2)
PSA SERPL-MCNC: 1.06 NG/ML
RBC # BLD AUTO: 3.97 X10*6/UL (ref 4.5–5.9)
SODIUM SERPL-SCNC: 140 MMOL/L (ref 136–145)
TRIGL SERPL-MCNC: 72 MG/DL (ref 0–149)
VLDL: 14 MG/DL (ref 0–40)
WBC # BLD AUTO: 5.7 X10*3/UL (ref 4.4–11.3)

## 2024-07-02 PROCEDURE — 80061 LIPID PANEL: CPT

## 2024-07-02 PROCEDURE — 85025 COMPLETE CBC W/AUTO DIFF WBC: CPT

## 2024-07-02 PROCEDURE — 80053 COMPREHEN METABOLIC PANEL: CPT

## 2024-07-02 PROCEDURE — 82306 VITAMIN D 25 HYDROXY: CPT

## 2024-07-02 PROCEDURE — G0103 PSA SCREENING: HCPCS

## 2024-07-02 PROCEDURE — 83036 HEMOGLOBIN GLYCOSYLATED A1C: CPT

## 2024-07-02 PROCEDURE — 36415 COLL VENOUS BLD VENIPUNCTURE: CPT

## 2024-07-02 ASSESSMENT — ENCOUNTER SYMPTOMS
WHEEZING: 0
ORTHOPNEA: 0
NECK PAIN: 0
SORE THROAT: 0
SWOLLEN GLANDS: 0
HEADACHES: 0
SPUTUM PRODUCTION: 0
HEMOPTYSIS: 0
FEVER: 0
SYNCOPE: 0
SHORTNESS OF BREATH: 1
RHINORRHEA: 0
CLAUDICATION: 1
VOMITING: 0
ABDOMINAL PAIN: 0
PND: 0
LEG PAIN: 0

## 2024-07-03 ENCOUNTER — APPOINTMENT (OUTPATIENT)
Dept: PRIMARY CARE | Facility: CLINIC | Age: 79
End: 2024-07-03
Payer: MEDICARE

## 2024-07-03 VITALS
BODY MASS INDEX: 28.89 KG/M2 | SYSTOLIC BLOOD PRESSURE: 108 MMHG | DIASTOLIC BLOOD PRESSURE: 62 MMHG | WEIGHT: 218 LBS | HEART RATE: 50 BPM | RESPIRATION RATE: 16 BRPM | HEIGHT: 73 IN | OXYGEN SATURATION: 97 %

## 2024-07-03 DIAGNOSIS — E55.9 VITAMIN D DEFICIENCY: ICD-10-CM

## 2024-07-03 DIAGNOSIS — E78.5 DM TYPE 2 WITH DIABETIC DYSLIPIDEMIA (MULTI): ICD-10-CM

## 2024-07-03 DIAGNOSIS — R06.02 SOB (SHORTNESS OF BREATH) ON EXERTION: ICD-10-CM

## 2024-07-03 DIAGNOSIS — E11.69 DM TYPE 2 WITH DIABETIC DYSLIPIDEMIA (MULTI): ICD-10-CM

## 2024-07-03 DIAGNOSIS — L57.0 ACTINIC KERATOSES: ICD-10-CM

## 2024-07-03 DIAGNOSIS — R53.83 FATIGUE, UNSPECIFIED TYPE: ICD-10-CM

## 2024-07-03 DIAGNOSIS — J45.901 MODERATE ASTHMA WITH ACUTE EXACERBATION, UNSPECIFIED WHETHER PERSISTENT (HHS-HCC): ICD-10-CM

## 2024-07-03 DIAGNOSIS — Z00.00 ROUTINE GENERAL MEDICAL EXAMINATION AT HEALTH CARE FACILITY: Primary | ICD-10-CM

## 2024-07-03 DIAGNOSIS — E78.2 MIXED HYPERLIPIDEMIA: ICD-10-CM

## 2024-07-03 PROCEDURE — 3074F SYST BP LT 130 MM HG: CPT | Performed by: FAMILY MEDICINE

## 2024-07-03 PROCEDURE — 17003 DESTRUCT PREMALG LES 2-14: CPT | Performed by: FAMILY MEDICINE

## 2024-07-03 PROCEDURE — 1170F FXNL STATUS ASSESSED: CPT | Performed by: FAMILY MEDICINE

## 2024-07-03 PROCEDURE — 1123F ACP DISCUSS/DSCN MKR DOCD: CPT | Performed by: FAMILY MEDICINE

## 2024-07-03 PROCEDURE — 1159F MED LIST DOCD IN RCRD: CPT | Performed by: FAMILY MEDICINE

## 2024-07-03 PROCEDURE — 3078F DIAST BP <80 MM HG: CPT | Performed by: FAMILY MEDICINE

## 2024-07-03 PROCEDURE — 1158F ADVNC CARE PLAN TLK DOCD: CPT | Performed by: FAMILY MEDICINE

## 2024-07-03 PROCEDURE — G0439 PPPS, SUBSEQ VISIT: HCPCS | Performed by: FAMILY MEDICINE

## 2024-07-03 PROCEDURE — 17000 DESTRUCT PREMALG LESION: CPT | Performed by: FAMILY MEDICINE

## 2024-07-03 PROCEDURE — 99214 OFFICE O/P EST MOD 30 MIN: CPT | Performed by: FAMILY MEDICINE

## 2024-07-03 RX ORDER — PREGABALIN 75 MG/1
75 CAPSULE ORAL 2 TIMES DAILY
Qty: 60 CAPSULE | Refills: 2 | Status: SHIPPED | OUTPATIENT
Start: 2024-07-03 | End: 2024-10-01

## 2024-07-03 ASSESSMENT — ACTIVITIES OF DAILY LIVING (ADL)
DOING_HOUSEWORK: INDEPENDENT
GROCERY_SHOPPING: INDEPENDENT
DRESSING: INDEPENDENT
BATHING: INDEPENDENT
MANAGING_FINANCES: INDEPENDENT
TAKING_MEDICATION: INDEPENDENT

## 2024-07-03 ASSESSMENT — ENCOUNTER SYMPTOMS
SPUTUM PRODUCTION: 0
LOSS OF SENSATION IN FEET: 0
PND: 0
RHINORRHEA: 0
ORTHOPNEA: 0
CLAUDICATION: 1
HEMOPTYSIS: 0
DEPRESSION: 0
FEVER: 0
SYNCOPE: 0
NECK PAIN: 0
ABDOMINAL PAIN: 0
WHEEZING: 0
OCCASIONAL FEELINGS OF UNSTEADINESS: 0
SHORTNESS OF BREATH: 1
VOMITING: 0
HEADACHES: 0
LEG PAIN: 0
SWOLLEN GLANDS: 0
SORE THROAT: 0

## 2024-07-03 ASSESSMENT — PATIENT HEALTH QUESTIONNAIRE - PHQ9
1. LITTLE INTEREST OR PLEASURE IN DOING THINGS: NOT AT ALL
2. FEELING DOWN, DEPRESSED OR HOPELESS: NOT AT ALL
SUM OF ALL RESPONSES TO PHQ9 QUESTIONS 1 AND 2: 0

## 2024-07-03 NOTE — PROGRESS NOTES
Subjective   Reason for Visit: Francis Vang is an 78 y.o. male here for a Medicare Wellness visit.     Past Medical, Surgical, and Family History reviewed and updated in chart.    Reviewed all medications by prescribing practitioner or clinical pharmacist (such as prescriptions, OTCs, herbal therapies and supplements) and documented in the medical record.    Patient is here for medicare wellness visit.    Patient continues to have SOB with exertion from 1 year and is worsening. Patient is taking Trelegy and not helping. Denies chest pain.  Last stress test was 10/6/22. Failed treadmill.      Legs are having increased neuropathy and weak when taking stairs.   Patient is asking for disability placard due to the SOB.    Blood glucose was 112 today. It does goes up to 135.   A1C 7.6  on 7/2/24.    No refills are needed today.    Patient would like to review blood work from 7/2/24    Patient needs lesion on right side of nose looked at today.    Shortness of Breath  This is a recurrent problem. The current episode started more than 1 year ago. The problem occurs constantly. The problem has been gradually worsening. The average episode lasts 5 minutes. Associated symptoms include claudication. Pertinent negatives include no abdominal pain, chest pain, coryza, ear pain, fever, headaches, hemoptysis, leg pain, leg swelling, neck pain, orthopnea, PND, rash, rhinorrhea, sore throat, sputum production, swollen glands, syncope, vomiting or wheezing. The symptoms are aggravated by occupational exposure, exercise and any activity.       Patient Care Team:  Jose Manuel Estrada MD as PCP - General  Jose Manuel Estrada MD as PCP - [employer] PCP  Jessica Laureano MD as Surgeon (Neurosurgery)     Review of Systems   Constitutional:  Negative for fever.   HENT:  Negative for ear pain, rhinorrhea and sore throat.    Respiratory:  Positive for shortness of breath. Negative for hemoptysis, sputum production and wheezing.   "  Cardiovascular:  Positive for claudication. Negative for chest pain, orthopnea, leg swelling, syncope and PND.   Gastrointestinal:  Negative for abdominal pain and vomiting.   Musculoskeletal:  Negative for neck pain.   Skin:  Negative for rash.   Neurological:  Negative for headaches.       Objective   Vitals:  /62 (BP Location: Left arm, Patient Position: Sitting, BP Cuff Size: Large adult)   Pulse 50   Resp 16   Ht 1.854 m (6' 1\")   Wt 98.9 kg (218 lb)   SpO2 97%   BMI 28.76 kg/m²       Physical Exam  Constitutional: Well developed, well nourished, alert and in no acute distress   Eyes: Normal external exam. Pupils equally round and reactive to light with normal accommodation and extraocular movements intact.  Neck: Supple, no lymphadenopathy or masses.   Cardiovascular: Regular rate and rhythm, normal S1 and S2, no murmurs, gallops, or rubs. Radial pulses normal. No peripheral edema.  Pulmonary: No respiratory distress, lungs clear to auscultation bilaterally. No wheezes, rhonchi, rales.  Abdomen: soft,non tender, non distended, without masses or HSM  Skin: Warm, well perfused, normal skin turgor and color.   Neurologic: Cranial nerves II-XII grossly intact.   Psychiatric: Mood calm and affect normal  Musculoskeletal: Moving all extremities without restriction    Assessment/Plan   Problem List Items Addressed This Visit       Asthma (HHS-HCC)    Relevant Orders    Follow Up In [facility] - PCP - Established    Disability Placard    DM type 2 with diabetic dyslipidemia (Multi)    Relevant Medications    pregabalin (Lyrica) 75 mg capsule    Other Relevant Orders    Follow Up In [facility] - PCP - Established    Disability Placard    Hemoglobin A1C    Fatigue    Relevant Orders    Follow Up In [facility] - PCP - Established    CBC and Auto Differential    Hyperlipidemia    Relevant Orders    Follow Up In [facility] - PCP - Established    Comprehensive " Metabolic Panel    Lipid Panel    Vitamin D deficiency    Relevant Orders    Follow Up In [facility] - PCP - Established    Vitamin D 25-Hydroxy,Total (for eval of Vitamin D levels)     Other Visit Diagnoses       Routine general medical examination at health care facility    -  Primary    SOB (shortness of breath) on exertion        Relevant Orders    Transthoracic Echo Complete                 Continue current medications and therapy for chronic medical conditions.    Patient was advised importance of proper diet/nutrition in addition adequate hydration. Patient was encouraged moderate exercise program to include 30 minutes daily for 5 days of the week or 150 minutes weekly. Patient will follow-up with us as scheduled.    Disability placard    Lesion removal ( 2) via cryo treatment nose & arm       Check next    Patient does not feel breztri helped symptoms   patient is taking Trelegy      Consider CT A & P next     Urine & bowel incont     Improved with vesicare & proscar and surgery     Could not tolerate metformin       PSA q 6 months        Nuclear stress wnl 10/2022      Consider ozempic - declines today    surgery on back scheduled 1/31/24 with ..   Start pregabalin 75 mg BID    Exercise at home on legs at shower 5-10 min  every day.    Get Echo        Scribe Attestation  By signing my name below, I, Jose Manuel Estrada MD , Scribe   attest that this documentation has been prepared under the direction and in the presence of Jose Manuel Estrada MD.    Provider Attestation - Scribe documentation    All medical record entries made by the Scribe were at my direction and personally dictated by me. I have reviewed the chart and agree that the record accurately reflects my personal performance of the history, physical exam, discussion and plan.      Patient ID: Francis Vang is a 78 y.o. male.    Destruction of lesion    Date/Time: 7/3/2024 10:12 AM    Performed by: Jose Manuel Estrada,  MD  Authorized by: Jose Manuel Estrada MD    Number of Lesions: 1  Lesion 1:     Body area: head/neck    Head/neck location: nose    Malignancy: benign lesion    Destruction of lesion    Date/Time: 7/3/2024 10:14 AM    Performed by: Jose Manuel Estrada MD  Authorized by: Jose Manuel Estrada MD    Number of Lesions: 1  Lesion 1:     Body area: upper extremity    Upper extremity location: R wrist    Malignancy: benign lesion

## 2024-07-03 NOTE — PROGRESS NOTES
"Subjective   Reason for Visit: Francis Vang is an 78 y.o. male here for a Medicare Wellness visit.     Past Medical, Surgical, and Family History reviewed and updated in chart.    Reviewed all medications by prescribing practitioner or clinical pharmacist (such as prescriptions, OTCs, herbal therapies and supplements) and documented in the medical record.    HPI    Patient Care Team:  Jose Manuel Estrada MD as PCP - General  Jose Manuel Estrada MD as PCP - [employer]vantage PCP  Jessica Laureano MD as Surgeon (Neurosurgery)     Review of Systems    Objective   Vitals:  /62 (BP Location: Left arm, Patient Position: Sitting, BP Cuff Size: Large adult)   Pulse 50   Resp 16   Ht 1.854 m (6' 1\")   Wt 98.9 kg (218 lb)   SpO2 97%   BMI 28.76 kg/m²       Physical Exam    Assessment/Plan   Problem List Items Addressed This Visit     Asthma (HHS-HCC)    DM type 2 with diabetic dyslipidemia (Multi)    Fatigue    Hyperlipidemia    Vitamin D deficiency   Other Visit Diagnoses     Routine general medical examination at health care facility    -  Primary    SOB (shortness of breath) on exertion                 "

## 2024-07-16 DIAGNOSIS — R39.14 BENIGN PROSTATIC HYPERPLASIA WITH INCOMPLETE BLADDER EMPTYING: ICD-10-CM

## 2024-07-16 DIAGNOSIS — N40.1 BENIGN PROSTATIC HYPERPLASIA WITH INCOMPLETE BLADDER EMPTYING: ICD-10-CM

## 2024-07-17 RX ORDER — SOLIFENACIN SUCCINATE 10 MG/1
10 TABLET, FILM COATED ORAL DAILY
Qty: 90 TABLET | Refills: 0 | Status: SHIPPED | OUTPATIENT
Start: 2024-07-17 | End: 2024-10-15

## 2024-07-24 ENCOUNTER — HOSPITAL ENCOUNTER (OUTPATIENT)
Dept: CARDIOLOGY | Facility: CLINIC | Age: 79
Discharge: HOME | End: 2024-07-24
Payer: MEDICARE

## 2024-07-24 DIAGNOSIS — R06.02 SOB (SHORTNESS OF BREATH) ON EXERTION: ICD-10-CM

## 2024-07-24 LAB
AORTIC VALVE MEAN GRADIENT: 5.5 MMHG
AORTIC VALVE PEAK VELOCITY: 1.64 M/S
AV PEAK GRADIENT: 10.7 MMHG
AVA (PEAK VEL): 2.93 CM2
AVA (VTI): 2.8 CM2
EJECTION FRACTION APICAL 4 CHAMBER: 76.3
EJECTION FRACTION: 65 %
LEFT ATRIUM VOLUME AREA LENGTH INDEX BSA: 49.6 ML/M2
LEFT VENTRICLE INTERNAL DIMENSION DIASTOLE: 5.3 CM (ref 3.5–6)
LEFT VENTRICULAR OUTFLOW TRACT DIAMETER: 2.48 CM
LV EJECTION FRACTION BIPLANE: 72 %
MITRAL VALVE E/A RATIO: 1.22
RIGHT VENTRICLE FREE WALL PEAK S': 18.35 CM/S
RIGHT VENTRICLE PEAK SYSTOLIC PRESSURE: 32.5 MMHG
TRICUSPID ANNULAR PLANE SYSTOLIC EXCURSION: 2.3 CM

## 2024-07-24 PROCEDURE — 93306 TTE W/DOPPLER COMPLETE: CPT

## 2024-07-24 PROCEDURE — 93306 TTE W/DOPPLER COMPLETE: CPT | Performed by: INTERNAL MEDICINE

## 2024-08-02 DIAGNOSIS — I10 ESSENTIAL (PRIMARY) HYPERTENSION: ICD-10-CM

## 2024-08-03 RX ORDER — HYDROCHLOROTHIAZIDE 25 MG/1
25 TABLET ORAL DAILY
Qty: 90 TABLET | Refills: 1 | Status: SHIPPED | OUTPATIENT
Start: 2024-08-03

## 2024-08-13 DIAGNOSIS — E11.9 DIABETES MELLITUS TYPE 2 WITHOUT RETINOPATHY (MULTI): ICD-10-CM

## 2024-08-13 NOTE — TELEPHONE ENCOUNTER
Pt needs a refill of test strips. Pt tests twice daily.      Please send to [facility] in [place] on [address]

## 2024-09-08 DIAGNOSIS — N40.1 BENIGN PROSTATIC HYPERPLASIA WITH INCOMPLETE BLADDER EMPTYING: ICD-10-CM

## 2024-09-08 DIAGNOSIS — Z79.01 LONG TERM (CURRENT) USE OF ANTICOAGULANTS: ICD-10-CM

## 2024-09-08 DIAGNOSIS — R39.14 BENIGN PROSTATIC HYPERPLASIA WITH INCOMPLETE BLADDER EMPTYING: ICD-10-CM

## 2024-09-09 RX ORDER — RIVAROXABAN 20 MG/1
TABLET, FILM COATED ORAL
Qty: 90 TABLET | Refills: 1 | Status: SHIPPED | OUTPATIENT
Start: 2024-09-09

## 2024-09-09 RX ORDER — FINASTERIDE 5 MG/1
5 TABLET, FILM COATED ORAL DAILY
Qty: 90 TABLET | Refills: 1 | Status: SHIPPED | OUTPATIENT
Start: 2024-09-09 | End: 2025-09-09

## 2024-09-14 DIAGNOSIS — E11.69 TYPE 2 DIABETES MELLITUS WITH OTHER SPECIFIED COMPLICATION: ICD-10-CM

## 2024-09-17 RX ORDER — PIOGLITAZONEHYDROCHLORIDE 45 MG/1
TABLET ORAL
Qty: 90 TABLET | Refills: 1 | Status: SHIPPED | OUTPATIENT
Start: 2024-09-17

## 2024-09-19 DIAGNOSIS — E78.5 HYPERLIPIDEMIA, UNSPECIFIED: ICD-10-CM

## 2024-09-19 DIAGNOSIS — E11.69 TYPE 2 DIABETES MELLITUS WITH OTHER SPECIFIED COMPLICATION: ICD-10-CM

## 2024-09-20 RX ORDER — EMPAGLIFLOZIN 25 MG/1
25 TABLET, FILM COATED ORAL DAILY
Qty: 90 TABLET | Refills: 1 | Status: SHIPPED | OUTPATIENT
Start: 2024-09-20

## 2024-09-27 DIAGNOSIS — I10 ESSENTIAL (PRIMARY) HYPERTENSION: ICD-10-CM

## 2024-09-27 RX ORDER — NIFEDIPINE 60 MG/1
60 TABLET, EXTENDED RELEASE ORAL DAILY
Qty: 90 TABLET | Refills: 1 | Status: SHIPPED | OUTPATIENT
Start: 2024-09-27

## 2024-10-01 ENCOUNTER — OFFICE VISIT (OUTPATIENT)
Dept: CARDIOLOGY | Facility: CLINIC | Age: 79
End: 2024-10-01
Payer: MEDICARE

## 2024-10-01 VITALS
HEART RATE: 46 BPM | HEIGHT: 72 IN | BODY MASS INDEX: 29.53 KG/M2 | RESPIRATION RATE: 16 BRPM | DIASTOLIC BLOOD PRESSURE: 70 MMHG | OXYGEN SATURATION: 93 % | TEMPERATURE: 95.8 F | WEIGHT: 218 LBS | SYSTOLIC BLOOD PRESSURE: 130 MMHG

## 2024-10-01 DIAGNOSIS — R94.31 ABNORMAL ELECTROCARDIOGRAM (ECG) (EKG): ICD-10-CM

## 2024-10-01 DIAGNOSIS — I48.0 PAROXYSMAL ATRIAL FIBRILLATION (MULTI): Primary | ICD-10-CM

## 2024-10-01 PROCEDURE — 1123F ACP DISCUSS/DSCN MKR DOCD: CPT | Performed by: INTERNAL MEDICINE

## 2024-10-01 PROCEDURE — 99214 OFFICE O/P EST MOD 30 MIN: CPT | Performed by: INTERNAL MEDICINE

## 2024-10-01 PROCEDURE — 3075F SYST BP GE 130 - 139MM HG: CPT | Performed by: INTERNAL MEDICINE

## 2024-10-01 PROCEDURE — 1159F MED LIST DOCD IN RCRD: CPT | Performed by: INTERNAL MEDICINE

## 2024-10-01 PROCEDURE — 93005 ELECTROCARDIOGRAM TRACING: CPT | Performed by: INTERNAL MEDICINE

## 2024-10-01 PROCEDURE — 99204 OFFICE O/P NEW MOD 45 MIN: CPT | Performed by: INTERNAL MEDICINE

## 2024-10-01 PROCEDURE — G2211 COMPLEX E/M VISIT ADD ON: HCPCS | Performed by: INTERNAL MEDICINE

## 2024-10-01 PROCEDURE — 93010 ELECTROCARDIOGRAM REPORT: CPT | Performed by: INTERNAL MEDICINE

## 2024-10-01 PROCEDURE — 1036F TOBACCO NON-USER: CPT | Performed by: INTERNAL MEDICINE

## 2024-10-01 PROCEDURE — 3078F DIAST BP <80 MM HG: CPT | Performed by: INTERNAL MEDICINE

## 2024-10-01 RX ORDER — REGADENOSON 0.08 MG/ML
0.4 INJECTION, SOLUTION INTRAVENOUS
OUTPATIENT
Start: 2024-10-01

## 2024-10-01 RX ORDER — AMINOPHYLLINE 25 MG/ML
125 INJECTION, SOLUTION INTRAVENOUS ONCE AS NEEDED
OUTPATIENT
Start: 2024-10-01

## 2024-10-01 NOTE — PROGRESS NOTES
Referring Provider: Jose Manuel Estrada MD  Reason for Consult: Fatigue    History of Present Illness:      Francis Vang is a 78 y.o. 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 Estrada for fatigue.    Has had an irregular heartbeat since his was a kid.  At some point, he was started on sotalol.  He states that he has been on sotalol for at least 20 years, possibly longer.  It was started for this irregular heartbeat, but there is no more specific diagnosis for this.  He has had increased dyspnea on exertion over the past year or so. Has to take a lot of breaks recently. Feels short of breath very quickly with minimal activity, such as walking up the slope of his driveway. This year, he has not been able to do the whole front yard because of fatigue and shortness of breath. Has also had episodes where he is getting lightheaded, which usually resolves with rest.     Focused Cardiovascular Problem List:  Type 2 diabetes  Hyperlipidemia  Hypertension  Prior DVT x 2 on Xarelto  Irregular heartbeat on Sotalol      Past Medical and Surgical History:  Mr. Vang  has a past medical history of Anemia, Arthritis, Asthma ([facility]), Cataracts, bilateral, Chronic anticoagulation, CKD (chronic kidney disease) stage 3, GFR 30-59 ml/min (Multi), COPD (chronic obstructive pulmonary disease) (Multi), Deep vein thrombosis (DVT) of lower extremity (Multi) (04/22/2013), Diabetes mellitus (Multi), DVT (deep venous thrombosis) (Multi), Encounter for general adult medical examination without abnormal findings (08/13/2020), GERD (gastroesophageal reflux disease), Gout, History of DVT (deep vein thrombosis), Hyperlipidemia, Hypertension, Neurogenic claudication, OA (osteoarthritis), RAD (reactive airway disease) ([facility]), Radiculopathy, Sleep apnea, and Spinal stenosis.    has a past  surgical history that includes Other surgical history (05/10/2019); Other surgical history (05/10/2019); Colonoscopy; Other surgical history; and Multiple tooth extractions.    Social History:  Social History     Tobacco Use    Smoking status: Never    Smokeless tobacco: Never   Substance Use Topics    Alcohol use: Not Currently      Tobacco: Denies  Alcohol: Denies  Drug use:  Denies  Occupational History: Retired  Other: Daughter is Mercy Vang who works at the [employer]    Relevant Family History:   Family History   Problem Relation Name Age of Onset    Cancer Mother      Hypertension Father      Cancer Sister      Seizures Sister       Allergies:  Allergies   Allergen Reactions    Ragweed Pollen Other     Summer seasonal allergies        Medications:  Current Outpatient Medications   Medication Instructions    allopurinol (Zyloprim) 300 mg tablet TAKE 1 TABLET BY MOUTH EVERY DAY    atorvastatin (LIPITOR) 10 mg, oral, Daily    blood sugar diagnostic strip 1 strip, miscellaneous, 2 times daily    blood-glucose meter misc Needed for Glucose reading    cyanocobalamin (VITAMIN B-12) 1,000 mcg, oral, Daily    finasteride (PROSCAR) 5 mg, oral, Daily, Do not crush, chew, or split.     fluticasone-umeclidin-vilanter (Trelegy Ellipta) 200-62.5-25 mcg blister with device 1 puff, inhalation, Daily before breakfast    hydroCHLOROthiazide (HYDRODIURIL) 25 mg, oral, Daily    Jardiance 25 mg, oral, Daily    lisinopril 5 mg, oral, Daily    montelukast (Singulair) 10 mg tablet TAKE 1 TABLET BY MOUTH EVERYDAY AT BEDTIME    NIFEdipine ER (ADALAT CC) 60 mg, oral, Daily    nitroglycerin (NITROSTAT) 0.4 mg, sublingual, Every 5 min PRN    OneTouch Delica Plus Lancet 33 gauge misc 2 EACH 2 TIMES A DAY.    pioglitazone (Actos) 45 mg tablet TAKE 1 TABLET BY MOUTH EVERY DAY    pregabalin (LYRICA) 75 mg, oral, 2 times daily    solifenacin (VESICARE) 10 mg, oral, Daily, Swallow tablet whole; do not crush, chew, or split.    sotalol AF  "120 mg, oral, 2 times daily    Xarelto 20 mg tablet TAKE 1 TABLET BY MOUTH EVERY DAY         Objective   Physical Exam:  Last Recorded Vitals:      2/1/2024     8:23 AM 2/1/2024     4:00 PM 2/22/2024    10:04 AM 3/4/2024    10:46 AM 4/3/2024     9:04 AM 5/16/2024    10:05 AM 7/3/2024     8:40 AM   Vitals   Systolic 117 113 110 122 112 130 108   Diastolic 57 61 64 62 52 64 62   Heart Rate 53 50 47 54 42 48 50   Temp 36.1 °C (97 °F) 36 °C (96.8 °F)        Resp 17 17 14  16  16   Height (in)   1.854 m (6' 1\")  1.854 m (6' 1\") 1.854 m (6' 1\") 1.854 m (6' 1\")   Weight (lb)   221.8 216 218.6 221.8 218   BMI   29.26 kg/m2 28.5 kg/m2 28.84 kg/m2 29.26 kg/m2 28.76 kg/m2   BSA (m2)   2.28 m2 2.25 m2 2.26 m2 2.28 m2 2.26 m2   Visit Report   Report Report Report Report Report    Visit Vitals  Smoking Status Never      Gen: NAD, sitting comfortably  HEENT: NC/AT  Card: Bradycardic, no m/r/g  Pulm: Clear to auscultation bilaterally  Ext: No LE edema  Neuro: No focal deficits    Diagnostic Results      My Interpretation of Reviewed Study(s):  Prior ECGs (reviewed and my interpretation):   10/1/2024: Sinus bradycardia, left axis deviation, ST and T wave abnormalities, QTc 462 ms        Echocardiography:  7/24/2024: Normal LVEF, moderate concentric LVH, mild to moderately dilated left atrium, otherwise normal heart       Stress Test:   10/2022: Normal nuclear stress test without inducible ischemia          Relevant Labs:  Lab Results   Component Value Date    CREATININE 1.71 (H) 07/02/2024    CREATININE 1.68 (H) 04/02/2024    CREATININE 1.68 (H) 02/01/2024    K 4.1 07/02/2024    K 3.8 04/02/2024    K 3.6 02/01/2024    HGBA1C 7.6 (H) 07/02/2024    HGBA1C 7.3 (H) 04/02/2024    HGBA1C 7.1 (A) 12/05/2023    HGB 12.6 (L) 07/02/2024    HGB 11.8 (L) 04/02/2024    HGB 10.8 (L) 02/01/2024    INR 1.9 (H) 12/04/2023    INR 1.0 09/08/2020    INR 1.1 09/08/2020    AST 12 07/02/2024    AST 12 04/02/2024    AST 12 12/04/2023    ALT 10 07/02/2024 "    ALT 8 (L) 04/02/2024    ALT 9 (L) 12/04/2023       Assessment/Plan   Assessment and Plan:  Francis Vang is a 78 y.o. year old male patient who is referred for management and evaluation of increased dyspnea on exertion and fatigue.  He has a history of an irregular heart rhythm for which he has been on sotalol for many years.  It is not clear to me what exactly this irregular heart rhythm was.  Certainly, there does not seem to have ever been a diagnosis of atrial fibrillation on EKG or PVCs.  He is quite bradycardic at rest today.    I suspect that some of his symptoms may be due to chronotropic incompetence due to his resting sinus bradycardia.  He also has not had a nuclear stress test in over 2 years.  Given the new symptoms of fatigue and dyspnea on exertion, it is reasonable to obtain an updated nuclear stress test.  Additionally, given no documentation of arrhythmia, I would recommend stopping sotalol at this time.  Additionally, given his age and GFR, I do not think the risk of sotalol is worth it to continue at this time.  Hopefully, his resting heart rate will improve off sotalol, we will see if this improves his symptoms.  I will also order a 14-day patch monitor to assess for any occult arrhythmia as well as to get an idea of his heart rates.    # Dyspnea on exertion  -Stop sotalol  - 14-day patch monitor  - Nuclear stress test         Return to Clinic:  2 months    Thank you very much for allowing me to participate in the care of this patient. Please do not hesitate to contact me with any further questions or concerns.    Katey Jordan MD  Clinical Cardiac Electrophysiologist, [facility] [place]    of Medicine, [facility] [employer]  Director of Atrial Fibrillation Ablation, [facility]  Director of Ventricular Arrhythmias Research, [facility]  Office Phone Number: [phone]

## 2024-10-02 ENCOUNTER — LAB (OUTPATIENT)
Dept: LAB | Facility: LAB | Age: 79
End: 2024-10-02
Payer: MEDICARE

## 2024-10-02 DIAGNOSIS — E11.69 DM TYPE 2 WITH DIABETIC DYSLIPIDEMIA (MULTI): ICD-10-CM

## 2024-10-02 DIAGNOSIS — E55.9 VITAMIN D DEFICIENCY: ICD-10-CM

## 2024-10-02 DIAGNOSIS — R53.83 FATIGUE, UNSPECIFIED TYPE: ICD-10-CM

## 2024-10-02 DIAGNOSIS — E78.2 MIXED HYPERLIPIDEMIA: ICD-10-CM

## 2024-10-02 DIAGNOSIS — E78.5 DM TYPE 2 WITH DIABETIC DYSLIPIDEMIA (MULTI): ICD-10-CM

## 2024-10-02 LAB
25(OH)D3 SERPL-MCNC: 34 NG/ML (ref 30–100)
ALBUMIN SERPL BCP-MCNC: 3.8 G/DL (ref 3.4–5)
ALP SERPL-CCNC: 84 U/L (ref 33–136)
ALT SERPL W P-5'-P-CCNC: 9 U/L (ref 10–52)
ANION GAP SERPL CALC-SCNC: 10 MMOL/L (ref 10–20)
AST SERPL W P-5'-P-CCNC: 13 U/L (ref 9–39)
BASOPHILS # BLD AUTO: 0.07 X10*3/UL (ref 0–0.1)
BASOPHILS NFR BLD AUTO: 1.1 %
BILIRUB SERPL-MCNC: 0.7 MG/DL (ref 0–1.2)
BUN SERPL-MCNC: 32 MG/DL (ref 6–23)
CALCIUM SERPL-MCNC: 9.5 MG/DL (ref 8.6–10.3)
CHLORIDE SERPL-SCNC: 104 MMOL/L (ref 98–107)
CHOLEST SERPL-MCNC: 113 MG/DL (ref 0–199)
CHOLESTEROL/HDL RATIO: 3.2
CO2 SERPL-SCNC: 31 MMOL/L (ref 21–32)
CREAT SERPL-MCNC: 1.65 MG/DL (ref 0.5–1.3)
EGFRCR SERPLBLD CKD-EPI 2021: 43 ML/MIN/1.73M*2
EOSINOPHIL # BLD AUTO: 0.37 X10*3/UL (ref 0–0.4)
EOSINOPHIL NFR BLD AUTO: 5.8 %
ERYTHROCYTE [DISTWIDTH] IN BLOOD BY AUTOMATED COUNT: 16 % (ref 11.5–14.5)
EST. AVERAGE GLUCOSE BLD GHB EST-MCNC: 166 MG/DL
GLUCOSE SERPL-MCNC: 131 MG/DL (ref 74–99)
HBA1C MFR BLD: 7.4 %
HCT VFR BLD AUTO: 38.7 % (ref 41–52)
HDLC SERPL-MCNC: 35.3 MG/DL
HGB BLD-MCNC: 12.8 G/DL (ref 13.5–17.5)
IMM GRANULOCYTES # BLD AUTO: 0.02 X10*3/UL (ref 0–0.5)
IMM GRANULOCYTES NFR BLD AUTO: 0.3 % (ref 0–0.9)
LDLC SERPL CALC-MCNC: 64 MG/DL
LYMPHOCYTES # BLD AUTO: 2.16 X10*3/UL (ref 0.8–3)
LYMPHOCYTES NFR BLD AUTO: 34 %
MCH RBC QN AUTO: 32.4 PG (ref 26–34)
MCHC RBC AUTO-ENTMCNC: 33.1 G/DL (ref 32–36)
MCV RBC AUTO: 98 FL (ref 80–100)
MONOCYTES # BLD AUTO: 1.06 X10*3/UL (ref 0.05–0.8)
MONOCYTES NFR BLD AUTO: 16.7 %
NEUTROPHILS # BLD AUTO: 2.68 X10*3/UL (ref 1.6–5.5)
NEUTROPHILS NFR BLD AUTO: 42.1 %
NON HDL CHOLESTEROL: 78 MG/DL (ref 0–149)
NRBC BLD-RTO: 0 /100 WBCS (ref 0–0)
PLATELET # BLD AUTO: 241 X10*3/UL (ref 150–450)
POTASSIUM SERPL-SCNC: 4.1 MMOL/L (ref 3.5–5.3)
PROT SERPL-MCNC: 6.6 G/DL (ref 6.4–8.2)
RBC # BLD AUTO: 3.95 X10*6/UL (ref 4.5–5.9)
SODIUM SERPL-SCNC: 141 MMOL/L (ref 136–145)
TRIGL SERPL-MCNC: 69 MG/DL (ref 0–149)
VLDL: 14 MG/DL (ref 0–40)
WBC # BLD AUTO: 6.4 X10*3/UL (ref 4.4–11.3)

## 2024-10-02 PROCEDURE — 82306 VITAMIN D 25 HYDROXY: CPT

## 2024-10-02 PROCEDURE — 80061 LIPID PANEL: CPT

## 2024-10-02 PROCEDURE — 83036 HEMOGLOBIN GLYCOSYLATED A1C: CPT

## 2024-10-02 PROCEDURE — 36415 COLL VENOUS BLD VENIPUNCTURE: CPT

## 2024-10-02 PROCEDURE — 85025 COMPLETE CBC W/AUTO DIFF WBC: CPT

## 2024-10-02 PROCEDURE — 80053 COMPREHEN METABOLIC PANEL: CPT

## 2024-10-03 ENCOUNTER — APPOINTMENT (OUTPATIENT)
Dept: PRIMARY CARE | Facility: CLINIC | Age: 79
End: 2024-10-03
Payer: MEDICARE

## 2024-10-03 VITALS
DIASTOLIC BLOOD PRESSURE: 52 MMHG | HEIGHT: 72 IN | SYSTOLIC BLOOD PRESSURE: 110 MMHG | HEART RATE: 52 BPM | BODY MASS INDEX: 29.66 KG/M2 | WEIGHT: 219 LBS | OXYGEN SATURATION: 98 % | RESPIRATION RATE: 16 BRPM

## 2024-10-03 DIAGNOSIS — Z23 NEED FOR INFLUENZA VACCINATION: ICD-10-CM

## 2024-10-03 DIAGNOSIS — E55.9 VITAMIN D DEFICIENCY: ICD-10-CM

## 2024-10-03 DIAGNOSIS — E78.5 DM TYPE 2 WITH DIABETIC DYSLIPIDEMIA (MULTI): ICD-10-CM

## 2024-10-03 DIAGNOSIS — E11.69 DM TYPE 2 WITH DIABETIC DYSLIPIDEMIA (MULTI): ICD-10-CM

## 2024-10-03 DIAGNOSIS — J45.901 MODERATE ASTHMA WITH ACUTE EXACERBATION, UNSPECIFIED WHETHER PERSISTENT (HHS-HCC): ICD-10-CM

## 2024-10-03 PROCEDURE — 3078F DIAST BP <80 MM HG: CPT | Performed by: FAMILY MEDICINE

## 2024-10-03 PROCEDURE — 1158F ADVNC CARE PLAN TLK DOCD: CPT | Performed by: FAMILY MEDICINE

## 2024-10-03 PROCEDURE — 3074F SYST BP LT 130 MM HG: CPT | Performed by: FAMILY MEDICINE

## 2024-10-03 PROCEDURE — G0008 ADMIN INFLUENZA VIRUS VAC: HCPCS | Performed by: FAMILY MEDICINE

## 2024-10-03 PROCEDURE — 1123F ACP DISCUSS/DSCN MKR DOCD: CPT | Performed by: FAMILY MEDICINE

## 2024-10-03 PROCEDURE — 1036F TOBACCO NON-USER: CPT | Performed by: FAMILY MEDICINE

## 2024-10-03 PROCEDURE — 90662 IIV NO PRSV INCREASED AG IM: CPT | Performed by: FAMILY MEDICINE

## 2024-10-03 PROCEDURE — 99214 OFFICE O/P EST MOD 30 MIN: CPT | Performed by: FAMILY MEDICINE

## 2024-10-03 PROCEDURE — 1159F MED LIST DOCD IN RCRD: CPT | Performed by: FAMILY MEDICINE

## 2024-10-03 RX ORDER — PREGABALIN 75 MG/1
75 CAPSULE ORAL 2 TIMES DAILY
Start: 2024-10-03 | End: 2025-01-01

## 2024-10-03 NOTE — PROGRESS NOTES
Subjective   Patient ID: Francis Vang is a 78 y.o. male who presents for Diabetes.    HPI   Patient is following up on diabetes. Patient had blood work done on 10/2/24. A1C 7.4. Patient is taking Actos 45 mg and Jardiance 25 mg daily.   Blood sugar was 154 this morning and he did not eat dinner. He did have rice krispies at 7 pm.    Patient would like nevus reviewed on right nostril.   Cryotherapy was used at last visit.    Cardiologist took patient off Sotalol.    Patient would like influenza shot today.       Review of Systems  12 Systems have been reviewed as follows.  Constitutional: Fever, weight gain, weight loss, appetite change, night sweats, fatigue, chills.  Eyes : blurry, double vision, vision, loss, tearing, redness, pain, sensitivity to light, glaucoma.  Ears, nose, mouth, and throat: Hearing loss, ringing in the ears, ear pain, nasal congestion, nasal drainage, nosebleeds, mouth, throat, irritation tooth problem.  Cardiovascular :chest pain, pressure, heart racing, palpitations, sweating, leg swelling, high or low blood pressure  Pulmonary: Cough, yellow or green sputum, blood and sputum, shortness of breath, wheezing  Gastrointestinal: Nausea, vomiting, diarrhea, constipation, pain, blood in stool, or vomitus, heartburn, difficulty swallowing  Genitourinary: incontinence, abnormal bleeding, abnormal discharge, urinary frequency, urinary hesitancy, pain, impotence sexual problem, infection, urinary retention  Musculoskeletal: Pain, stiffness, joint, redness or warmth, arthritis, back pain, weakness, muscle wasting, sprain or fracture  Neuro: Weight weakness, dizziness, change in voice, change in taste change in vision, change in hearing, loss, or change of sensation, trouble walking, balance problems coordination problems, shaking, speech problem  Endocrine , cold or heat intolerance, blood sugar problem, weight gain or loss missed periods hot flashes, sweats, change in body hair, change in  libido, increased thirst, increased urination  Heme/lymph: Swelling, bleeding, problem anemia, bruising, enlarged lymph nodes  Allergic/immunologic: H. plus nasal drip, watery itchy eyes, nasal drainage, immunosuppressed  The above were reviewed and noted negative except as noted in HPI and Problem List.      Objective   /52 (BP Location: Right arm, Patient Position: Sitting, BP Cuff Size: Adult)   Pulse 52   Resp 16   Ht 1.829 m (6')   Wt 99.3 kg (219 lb)   SpO2 98%   BMI 29.70 kg/m²     Physical Exam  Vitals reviewed.   Constitutional:       General: He is not in acute distress.     Appearance: Normal appearance. He is normal weight. He is not ill-appearing or diaphoretic.   HENT:      Head: Normocephalic.      Right Ear: Tympanic membrane and external ear normal.      Left Ear: Tympanic membrane and external ear normal.      Nose: Nose normal. No congestion.      Mouth/Throat:      Pharynx: No posterior oropharyngeal erythema.   Eyes:      General:         Right eye: No discharge.         Left eye: No discharge.      Extraocular Movements: Extraocular movements intact.      Conjunctiva/sclera: Conjunctivae normal.      Pupils: Pupils are equal, round, and reactive to light.   Cardiovascular:      Rate and Rhythm: Normal rate and regular rhythm.      Pulses: Normal pulses.      Heart sounds: Normal heart sounds. No murmur heard.  Pulmonary:      Effort: Pulmonary effort is normal. No respiratory distress.      Breath sounds: Normal breath sounds. No wheezing or rales.   Chest:      Chest wall: No tenderness.   Abdominal:      General: Abdomen is flat. Bowel sounds are normal. There is no distension.      Palpations: There is no mass.      Tenderness: There is no abdominal tenderness. There is no guarding.   Musculoskeletal:         General: No tenderness. Normal range of motion.      Cervical back: Normal range of motion and neck supple. No tenderness.      Right lower leg: No edema.      Left lower  leg: No edema.   Skin:     General: Skin is dry.      Coloration: Skin is not jaundiced.      Findings: No bruising, erythema or rash.   Neurological:      General: No focal deficit present.      Mental Status: He is alert and oriented to person, place, and time. Mental status is at baseline.      Cranial Nerves: No cranial nerve deficit.      Sensory: No sensory deficit.      Coordination: Coordination normal.      Gait: Gait normal.   Psychiatric:         Mood and Affect: Mood normal.         Thought Content: Thought content normal.         Judgment: Judgment normal.         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    Relevant Medications    pregabalin (Lyrica) 75 mg capsule    semaglutide 0.25 mg or 0.5 mg (2 mg/3 mL) pen injector    Other Relevant Orders    Follow Up In [facility] - PCP - Established    Follow Up In [facility] - Pharmacy    Vitamin D deficiency E55.9    Relevant Orders    Follow Up In [facility] - PCP - Established     Other Visit Diagnoses         Codes    Need for influenza vaccination     Z23    Relevant Orders    Flu vaccine, trivalent, preservative free, HIGH-DOSE, age 65y+ (Fluzone) (Completed)                  Scribe Attestation  By signing my name below, I, Risa Seay CMA, Scribe   attest that this documentation has been prepared under the direction and in the presence of Jose Manuel Estrada MD.       Provider Attestation - Scribe documentation    All medical record entries made by the Scribe were at my direction and personally dictated by me. I have reviewed the chart and agree that the record accurately reflects my personal performance of the history, physical exam, discussion and plan.      Continue current medications and therapy for chronic medical  conditions.    Patient was advised importance of proper diet/nutrition in addition adequate hydration. Patient was encouraged moderate exercise program to include 30 minutes daily for 5 days of the week or 150 minutes weekly. Patient will follow-up with us as scheduled.          Continue current medications and therapy for chronic medical conditions.    Patient was advised importance of proper diet/nutrition in addition adequate hydration. Patient was encouraged moderate exercise program to include 30 minutes daily for 5 days of the week or 150 minutes weekly. Patient will follow-up with us as scheduled.    Flu shot given today    Lesion removal ( 2) via cryo treatment nose & arm       Check next     Patient does not feel breztri helped symptoms   patient is taking Trelegy but pt stopped     Consider CT A & P symptoms resolved     Urine & bowel incont     Improved with vesicare & proscar and surgery     Could not tolerate metformin       PSA q 6 months        Nuclear stress wnl 10/2022      ozempic  0.25 mg weekly  given  Pharmacy referral placed     surgery on back scheduled 1/31/24 with ..   restarted pregabalin 75 mg BID      Exercise at home on legs at shower 5-10 min  every day.     ECHO done 7/24/24  Holter and nuclear stress test in future via Dr. Jordan

## 2024-10-08 DIAGNOSIS — E11.69 DM TYPE 2 WITH DIABETIC DYSLIPIDEMIA (MULTI): Primary | ICD-10-CM

## 2024-10-08 DIAGNOSIS — E78.5 DM TYPE 2 WITH DIABETIC DYSLIPIDEMIA (MULTI): Primary | ICD-10-CM

## 2024-10-17 ENCOUNTER — HOSPITAL ENCOUNTER (OUTPATIENT)
Dept: CARDIOLOGY | Facility: HOSPITAL | Age: 79
Discharge: HOME | End: 2024-10-17
Payer: MEDICARE

## 2024-10-17 ENCOUNTER — HOSPITAL ENCOUNTER (OUTPATIENT)
Dept: RADIOLOGY | Facility: HOSPITAL | Age: 79
Discharge: HOME | End: 2024-10-17
Payer: MEDICARE

## 2024-10-17 DIAGNOSIS — R94.31 ABNORMAL ELECTROCARDIOGRAM (ECG) (EKG): ICD-10-CM

## 2024-10-17 DIAGNOSIS — I48.0 PAROXYSMAL ATRIAL FIBRILLATION (MULTI): ICD-10-CM

## 2024-10-17 PROCEDURE — 2500000004 HC RX 250 GENERAL PHARMACY W/ HCPCS (ALT 636 FOR OP/ED): Performed by: INTERNAL MEDICINE

## 2024-10-17 PROCEDURE — 3430000001 HC RX 343 DIAGNOSTIC RADIOPHARMACEUTICALS: Performed by: INTERNAL MEDICINE

## 2024-10-17 PROCEDURE — 78452 HT MUSCLE IMAGE SPECT MULT: CPT

## 2024-10-17 PROCEDURE — A9502 TC99M TETROFOSMIN: HCPCS | Performed by: INTERNAL MEDICINE

## 2024-10-17 PROCEDURE — 78452 HT MUSCLE IMAGE SPECT MULT: CPT | Performed by: INTERNAL MEDICINE

## 2024-10-17 PROCEDURE — 93017 CV STRESS TEST TRACING ONLY: CPT

## 2024-10-17 RX ORDER — REGADENOSON 0.08 MG/ML
0.4 INJECTION, SOLUTION INTRAVENOUS
Status: COMPLETED | OUTPATIENT
Start: 2024-10-17 | End: 2024-10-17

## 2024-10-17 ASSESSMENT — ENCOUNTER SYMPTOMS
OCCASIONAL FEELINGS OF UNSTEADINESS: 0
LOSS OF SENSATION IN FEET: 0
DEPRESSION: 0

## 2024-10-18 DIAGNOSIS — N40.1 BENIGN PROSTATIC HYPERPLASIA WITH INCOMPLETE BLADDER EMPTYING: ICD-10-CM

## 2024-10-18 DIAGNOSIS — R39.14 BENIGN PROSTATIC HYPERPLASIA WITH INCOMPLETE BLADDER EMPTYING: ICD-10-CM

## 2024-10-18 RX ORDER — SOLIFENACIN SUCCINATE 10 MG/1
10 TABLET, FILM COATED ORAL DAILY
Qty: 90 TABLET | Refills: 0 | Status: SHIPPED | OUTPATIENT
Start: 2024-10-18 | End: 2025-01-16

## 2024-10-22 NOTE — PROGRESS NOTES
Clinical Pharmacy Appointment    Patient ID: Francis Vang is a 78 y.o. male who presents for Diabetes.    Pt is here for First appointment.     Referring Provider: Jose Manuel Estrada MD  PCP: Jose Manuel Estrada MD   Last visit with PCP: 10/3/24   Next visit with PCP: 11/4/24      Subjective     HPI    DIABETES MELLITUS TYPE 2:    Diagnosed with diabetes: 20-25+ years ago. Known diabetic complications: none.  Does patient follow with Endocrinology: No  Last optometry exam: sees every year, last appt last month  Most recent visit in Podiatry: n/a-- patient denies sores or cuts on feet today      Current diabetic medications include:  Jardiance 25mg - every day  Ozempic 0.25mg subcutaneous weekly  Pioglitazone 45mg - every day      Past diabetic medications include:  Metformin 500mg ER (could not tolerate)    Clarifications to above regimen: none  Adverse Effects: none    Glucose Readings:  Glucometer/CGM Type: Test strips-OneTouch  Patient tests BG 1 times per day    Current home BG readings: 90-120s AM   Previous home BG readings: 120s AM- Ozempic has helped decrease AM readings to 90s    Any episodes of hypoglycemia? No, lowest was 99 .  Did patient treat episode of hypoglycemia appropriately? N/A  Does the patient have a prescription for ready-to-use Glucagon? No not on insulin  Does pt have proteinuria? No recent urine culture    Lifestyle:  Diet: 2 meals/day. Late breakfast and late lunch  BK: cereal with milk - cheerios, rice krispies, rye bread; eggs, toast, sausage, potatoes  LN: salads, potatoes, green beans, chicken, spaghetti    Drinks: coffee in the morning, tea, water, pop (occasionally during the summer)  Physical Activity: fixes stuff around the house; yard work    Secondary Prevention:  Statin? Yes- Atorvastatin 10mg- every day  ACE-I/ARB? Yes- Lisinopril 5mg - every day   Aspirin? No    Pertinent PMH Review:  PMH of Pancreatitis: No  PMH of Retinopathy: No  PMH of Urinary Tract Infections:  No  PMH of MTC: No     Medication Reconciliation:  Discontinued: Vitamin B-12- pt stopped as levels were normal  Not taking: Pregabalin 75mg- pt complained of blurry vision    Drug Interactions  No relevant drug interactions were noted.    Medication System Management  Patient's preferred pharmacy: [facility]  Adherence/Organization: last refilled in Sept   Affordability/Accessibility: none      Objective   Allergies   Allergen Reactions    Ragweed Pollen Other     Summer seasonal allergies     Social History     Social History Narrative    Not on file      Medication Review  Current Outpatient Medications   Medication Instructions    allopurinol (Zyloprim) 300 mg tablet TAKE 1 TABLET BY MOUTH EVERY DAY    atorvastatin (LIPITOR) 10 mg, oral, Daily    blood sugar diagnostic strip 1 strip, miscellaneous, 2 times daily    blood-glucose meter misc Needed for Glucose reading    cyanocobalamin (VITAMIN B-12) 1,000 mcg, oral, Daily    finasteride (PROSCAR) 5 mg, oral, Daily, Do not crush, chew, or split.     hydroCHLOROthiazide (HYDRODIURIL) 25 mg, oral, Daily    Jardiance 25 mg, oral, Daily    lisinopril 5 mg, oral, Daily    montelukast (Singulair) 10 mg tablet TAKE 1 TABLET BY MOUTH EVERYDAY AT BEDTIME    NIFEdipine ER (ADALAT CC) 60 mg, oral, Daily    nitroglycerin (NITROSTAT) 0.4 mg, Every 5 min PRN    OneTouch Delica Plus Lancet 33 gauge misc 2 EACH 2 TIMES A DAY.    pioglitazone (Actos) 45 mg tablet TAKE 1 TABLET BY MOUTH EVERY DAY    pregabalin (LYRICA) 75 mg, oral, 2 times daily    semaglutide 0.25 mg, subcutaneous, Weekly    solifenacin (VESICARE) 10 mg, oral, Daily, Swallow tablet whole; do not crush, chew, or split.    Xarelto 20 mg tablet TAKE 1 TABLET BY MOUTH EVERY DAY      Vitals  BP Readings from Last 2 Encounters:   10/03/24 110/52   10/01/24 130/70     BMI Readings from Last 1 Encounters:   10/03/24 29.70 kg/m²      Labs  A1C  Lab Results   Component Value Date    HGBA1C 7.4 (H) 10/02/2024    HGBA1C  "7.6 (H) 07/02/2024    HGBA1C 7.3 (H) 04/02/2024     BMP  Lab Results   Component Value Date    CALCIUM 9.5 10/02/2024     10/02/2024    K 4.1 10/02/2024    CO2 31 10/02/2024     10/02/2024    BUN 32 (H) 10/02/2024    CREATININE 1.65 (H) 10/02/2024    EGFR 43 (L) 10/02/2024     LFTs  Lab Results   Component Value Date    ALT 9 (L) 10/02/2024    AST 13 10/02/2024    ALKPHOS 84 10/02/2024    BILITOT 0.7 10/02/2024     FLP  Lab Results   Component Value Date    TRIG 69 10/02/2024    CHOL 113 10/02/2024    LDLF 47 08/24/2023    LDLCALC 64 10/02/2024    HDL 35.3 10/02/2024     Urine Microalbumin  No results found for: \"MICROALBCREA\"    Weight Management  Wt Readings from Last 3 Encounters:   10/03/24 99.3 kg (219 lb)   10/01/24 98.9 kg (218 lb)   07/03/24 98.9 kg (218 lb)      There is no height or weight on file to calculate BMI.     Assessment/Plan   Problem List Items Addressed This Visit       DM type 2 with diabetic dyslipidemia (Multi)     Patient's goal A1c is < 7%.  Is pt at goal? No, 7.4% (10/2024)  Patient's SMBGs are 90s-120s.     Rationale for plan: Continue current diabetic medications, increase dose of Ozempic to 0.5mg subcutaneous weekly due to tolerating well.    Patient discussed an increase in his tremor since starting Ozempic. States it happens mostly when he writes. He states it went away after stopping Sotolol, but came back a few days later when Ozempic was started.    Medication Changes:  INCREASE  Ozempic to 0.5mg subcutaneous weekly, patient has refill at pharmacy    Future Considerations:  Continue improving on lifestyle changes such as adding more physical activity and healthier dietary changes.    Diabetes Education  Rule of 15: eating ~15 g of carbs when BG less than 80 (half cup juice, 3-4 glucose tabs).  Recognize symptoms of high and low blood sugar.   Eat a realistic healthy diet consisting of fruits, vegetables, fiber, protein food choices on a regular basis and be aware of " portion/serving sizes. Reduce carbohydrate consumption and always consume with protein and fat. Avoid foods high in saturated/trans fat, high salt content, and sweets and beverages with added sugars.  Limit alcohol consumption; alcohol may affect your blood sugar and cause hypoglycemia.   Stay active and incorporate ~30 mins of exercise into your daily routine to manage your weight and increase the body's acceptance of insulin.    PATIENT GOALS   Fasting B - 130 mg/dL 2-HR Postprandial BG:   Less than 180 mg/dL A1c:   Less than 7.0 %     Ozempic Education:  Counseled patient on Ozempic MOA, expectations, side effects, duration of therapy, administration, and monitoring parameters.  Counseled patient on the benefits of GLP-1ra, such as cardiovascular risk reduction, glycemic control, and weight loss potential.  Provided detailed dosing and administration counseling to ensure proper technique.   Reviewed Ozempic titration schedule, starting with 0.25 mg once weekly to 0.5 mg, 1 mg, and if tolerated 2 mg.  Reviewed storage requirements of Ozempic when not in use, and when to administer the medication if a dose is missed.  Discussed risks of GLP1ra including risk of pancreatitis, MTC and worsening of DR  Advised patient that they may experience improved satiety after meals and portion sizes of meals may be reduced as doses of Ozempic increase.      Empagliflozin (Jardiance) Education:  Counseled patient on Empagliflozin (Jardiance) MOA, expectations, side effects, duration of therapy, administration, and monitoring parameters.  Reviewed the benefits of SGLT-2i therapy, such as glycemic control and kidney and CV protection.  Advised patient to practice proper  hygiene to reduce risk of UTIs or yeast infections.  Advised patient to maintain adequate fluid intake to remain hydrated while on SGLT2i therapy.  Answered all patient questions and concerns.          Relevant Orders    Referral to Clinical Pharmacy        Clinical Pharmacist follow-up: 9:30am 11/21/2024, Telehealth visit    Continue all meds under the continuation of care with the referring provider and clinical pharmacy team.    Thank you,  Coral Fernandez  Clinical Pharmacist  [phone]    Verbal consent to manage patient's drug therapy was obtained from the patient. They were informed they may decline to participate or withdraw from participation in pharmacy services at any time.

## 2024-10-24 ENCOUNTER — APPOINTMENT (OUTPATIENT)
Dept: PHARMACY | Facility: HOSPITAL | Age: 79
End: 2024-10-24
Payer: MEDICARE

## 2024-10-24 DIAGNOSIS — E11.69 DM TYPE 2 WITH DIABETIC DYSLIPIDEMIA (MULTI): ICD-10-CM

## 2024-10-24 DIAGNOSIS — E78.5 DM TYPE 2 WITH DIABETIC DYSLIPIDEMIA (MULTI): ICD-10-CM

## 2024-10-24 NOTE — ASSESSMENT & PLAN NOTE
Patient's goal A1c is < 7%.  Is pt at goal? No, 7.4% (10/2024)  Patient's SMBGs are 90s-120s.     Rationale for plan: Continue current diabetic medications, increase dose of Ozempic to 0.5mg subcutaneous weekly due to tolerating well.    Patient discussed an increase in his tremor since starting Ozempic. States it happens mostly when he writes. He states it went away after stopping Sotolol, but came back a few days later when Ozempic was started.    Medication Changes:  INCREASE  Ozempic to 0.5mg subcutaneous weekly, patient has refill at pharmacy    Future Considerations:  Continue improving on lifestyle changes such as adding more physical activity and healthier dietary changes.    Diabetes Education  Rule of 15: eating ~15 g of carbs when BG less than 80 (half cup juice, 3-4 glucose tabs).  Recognize symptoms of high and low blood sugar.   Eat a realistic healthy diet consisting of fruits, vegetables, fiber, protein food choices on a regular basis and be aware of portion/serving sizes. Reduce carbohydrate consumption and always consume with protein and fat. Avoid foods high in saturated/trans fat, high salt content, and sweets and beverages with added sugars.  Limit alcohol consumption; alcohol may affect your blood sugar and cause hypoglycemia.   Stay active and incorporate ~30 mins of exercise into your daily routine to manage your weight and increase the body's acceptance of insulin.    PATIENT GOALS   Fasting B - 130 mg/dL 2-HR Postprandial BG:   Less than 180 mg/dL A1c:   Less than 7.0 %     Ozempic Education:  Counseled patient on Ozempic MOA, expectations, side effects, duration of therapy, administration, and monitoring parameters.  Counseled patient on the benefits of GLP-1ra, such as cardiovascular risk reduction, glycemic control, and weight loss potential.  Provided detailed dosing and administration counseling to ensure proper technique.   Reviewed Ozempic titration schedule, starting with  0.25 mg once weekly to 0.5 mg, 1 mg, and if tolerated 2 mg.  Reviewed storage requirements of Ozempic when not in use, and when to administer the medication if a dose is missed.  Discussed risks of GLP1ra including risk of pancreatitis, MTC and worsening of DR  Advised patient that they may experience improved satiety after meals and portion sizes of meals may be reduced as doses of Ozempic increase.      Empagliflozin (Jardiance) Education:  Counseled patient on Empagliflozin (Jardiance) MOA, expectations, side effects, duration of therapy, administration, and monitoring parameters.  Reviewed the benefits of SGLT-2i therapy, such as glycemic control and kidney and CV protection.  Advised patient to practice proper  hygiene to reduce risk of UTIs or yeast infections.  Advised patient to maintain adequate fluid intake to remain hydrated while on SGLT2i therapy.  Answered all patient questions and concerns.

## 2024-10-26 DIAGNOSIS — E78.5 HYPERLIPIDEMIA, UNSPECIFIED: ICD-10-CM

## 2024-10-26 DIAGNOSIS — J30.9 ALLERGIC RHINITIS, UNSPECIFIED: ICD-10-CM

## 2024-10-27 DIAGNOSIS — M10.9 GOUT, UNSPECIFIED: ICD-10-CM

## 2024-10-29 RX ORDER — MONTELUKAST SODIUM 10 MG/1
TABLET ORAL
Qty: 90 TABLET | Refills: 1 | Status: SHIPPED | OUTPATIENT
Start: 2024-10-29

## 2024-10-29 RX ORDER — ALLOPURINOL 300 MG/1
TABLET ORAL
Qty: 90 TABLET | Refills: 1 | Status: SHIPPED | OUTPATIENT
Start: 2024-10-29

## 2024-10-29 RX ORDER — ATORVASTATIN CALCIUM 10 MG/1
10 TABLET, FILM COATED ORAL DAILY
Qty: 90 TABLET | Refills: 1 | Status: SHIPPED | OUTPATIENT
Start: 2024-10-29

## 2024-11-04 ENCOUNTER — APPOINTMENT (OUTPATIENT)
Dept: PRIMARY CARE | Facility: CLINIC | Age: 79
End: 2024-11-04
Payer: MEDICARE

## 2024-11-04 VITALS
WEIGHT: 216.8 LBS | SYSTOLIC BLOOD PRESSURE: 124 MMHG | OXYGEN SATURATION: 96 % | DIASTOLIC BLOOD PRESSURE: 62 MMHG | RESPIRATION RATE: 16 BRPM | HEIGHT: 72 IN | TEMPERATURE: 97.2 F | HEART RATE: 68 BPM | BODY MASS INDEX: 29.36 KG/M2

## 2024-11-04 DIAGNOSIS — E11.9 DIABETES MELLITUS TYPE 2 WITHOUT RETINOPATHY (MULTI): ICD-10-CM

## 2024-11-04 DIAGNOSIS — R53.83 FATIGUE, UNSPECIFIED TYPE: Primary | ICD-10-CM

## 2024-11-04 DIAGNOSIS — Z12.5 SCREENING FOR PROSTATE CANCER: ICD-10-CM

## 2024-11-04 DIAGNOSIS — R25.1 TREMOR: ICD-10-CM

## 2024-11-04 DIAGNOSIS — E78.5 DM TYPE 2 WITH DIABETIC DYSLIPIDEMIA (MULTI): ICD-10-CM

## 2024-11-04 DIAGNOSIS — E78.2 MIXED HYPERLIPIDEMIA: ICD-10-CM

## 2024-11-04 DIAGNOSIS — Z86.718 HISTORY OF DVT (DEEP VEIN THROMBOSIS): ICD-10-CM

## 2024-11-04 DIAGNOSIS — L57.0 ACTINIC KERATOSES: ICD-10-CM

## 2024-11-04 DIAGNOSIS — E11.69 DM TYPE 2 WITH DIABETIC DYSLIPIDEMIA (MULTI): ICD-10-CM

## 2024-11-04 DIAGNOSIS — I10 PRIMARY HYPERTENSION: ICD-10-CM

## 2024-11-04 DIAGNOSIS — L82.1 SEBORRHEIC KERATOSIS: ICD-10-CM

## 2024-11-04 DIAGNOSIS — E55.9 VITAMIN D DEFICIENCY: ICD-10-CM

## 2024-11-04 DIAGNOSIS — J45.901 MODERATE ASTHMA WITH ACUTE EXACERBATION, UNSPECIFIED WHETHER PERSISTENT (HHS-HCC): ICD-10-CM

## 2024-11-04 PROCEDURE — 17003 DESTRUCT PREMALG LES 2-14: CPT | Performed by: FAMILY MEDICINE

## 2024-11-04 PROCEDURE — 99214 OFFICE O/P EST MOD 30 MIN: CPT | Performed by: FAMILY MEDICINE

## 2024-11-04 PROCEDURE — 17000 DESTRUCT PREMALG LESION: CPT | Performed by: FAMILY MEDICINE

## 2024-11-04 ASSESSMENT — ENCOUNTER SYMPTOMS
NECK PAIN: 0
CARDIOVASCULAR NEGATIVE: 1
HYPERACTIVE: 0
APNEA: 0
EYE ITCHING: 0
ADENOPATHY: 0
WEAKNESS: 0
GASTROINTESTINAL NEGATIVE: 1
DYSPHORIC MOOD: 0
PALPITATIONS: 0
DYSURIA: 0
DEPRESSION: 0
MYALGIAS: 0
NUMBNESS: 0
WHEEZING: 0
PHOTOPHOBIA: 0
OCCASIONAL FEELINGS OF UNSTEADINESS: 0
ACTIVITY CHANGE: 0
FACIAL SWELLING: 0
FACIAL ASYMMETRY: 0
COLOR CHANGE: 0
NAUSEA: 0
ABDOMINAL DISTENTION: 0
BACK PAIN: 0
BRUISES/BLEEDS EASILY: 0
APPETITE CHANGE: 0
NERVOUS/ANXIOUS: 0
CONSTIPATION: 0
TROUBLE SWALLOWING: 0
COUGH: 0
DIARRHEA: 0
SLEEP DISTURBANCE: 0
EYE DISCHARGE: 0
SEIZURES: 0
LIGHT-HEADEDNESS: 0
CHEST TIGHTNESS: 0
VOMITING: 0
FEVER: 0
POLYPHAGIA: 0
ABDOMINAL PAIN: 0
HEMATURIA: 0
JOINT SWELLING: 0
CHILLS: 0
BLOOD IN STOOL: 0
SHORTNESS OF BREATH: 0
ANAL BLEEDING: 0
SINUS PRESSURE: 0
POLYDIPSIA: 0
NECK STIFFNESS: 0
DECREASED CONCENTRATION: 0
WOUND: 0
SORE THROAT: 0
EYE PAIN: 0
FATIGUE: 0
FLANK PAIN: 0
RHINORRHEA: 0
LOSS OF SENSATION IN FEET: 0
AGITATION: 0
SINUS PAIN: 0
HEADACHES: 0
RECTAL PAIN: 0
CONSTITUTIONAL NEGATIVE: 1
DIFFICULTY URINATING: 0
FREQUENCY: 0
EYE REDNESS: 0
VOICE CHANGE: 0
SPEECH DIFFICULTY: 0
UNEXPECTED WEIGHT CHANGE: 0
TREMORS: 0
HALLUCINATIONS: 0
DIAPHORESIS: 0
CONFUSION: 0
DIZZINESS: 0
STRIDOR: 0
CHOKING: 0
ARTHRALGIAS: 0

## 2024-11-04 ASSESSMENT — PATIENT HEALTH QUESTIONNAIRE - PHQ9
SUM OF ALL RESPONSES TO PHQ9 QUESTIONS 1 AND 2: 0
1. LITTLE INTEREST OR PLEASURE IN DOING THINGS: NOT AT ALL
2. FEELING DOWN, DEPRESSED OR HOPELESS: NOT AT ALL

## 2024-11-04 NOTE — PROGRESS NOTES
Subjective   Patient ID: Francis Vang is a 78 y.o. male who presents for Diabetes and Skin Tag.    HPI   Patient is at the office today  to discuss medication prescribed to lower A1c levels. Patient reports that he is using Ozempic 0.25 mg for 3 weeks and this past week was 0.5 mg. Patient reports that since he started medication he is experiencing hand shaking.    Patient reports glucose levels of 114 this morning.    Skin tag on right side of the nose needs to be discussed today. This is an ongoing issue for the patient. Last time it was treated with liquid nitrogen.    No other concerns reported by the patient today.    Review of Systems   Constitutional: Negative.  Negative for activity change, appetite change, chills, diaphoresis, fatigue, fever and unexpected weight change.   HENT: Negative.  Negative for congestion, dental problem, ear discharge, facial swelling, hearing loss, mouth sores, nosebleeds, postnasal drip, rhinorrhea, sinus pressure, sinus pain, sneezing, sore throat, tinnitus, trouble swallowing and voice change.    Eyes:  Negative for photophobia, pain, discharge, redness, itching and visual disturbance.   Respiratory:  Negative for apnea, cough, choking, chest tightness, shortness of breath, wheezing and stridor.    Cardiovascular: Negative.  Negative for chest pain, palpitations and leg swelling.   Gastrointestinal: Negative.  Negative for abdominal distention, abdominal pain, anal bleeding, blood in stool, constipation, diarrhea, nausea, rectal pain and vomiting.   Endocrine: Negative for cold intolerance, heat intolerance, polydipsia, polyphagia and polyuria.   Genitourinary:  Negative for decreased urine volume, difficulty urinating, dysuria, enuresis, flank pain, frequency, hematuria and urgency.   Musculoskeletal:  Negative for arthralgias, back pain, gait problem, joint swelling, myalgias, neck pain and neck stiffness.   Skin:  Negative for color change, pallor, rash and wound.    Allergic/Immunologic: Negative for environmental allergies, food allergies and immunocompromised state.   Neurological:  Negative for dizziness, tremors, seizures, syncope, facial asymmetry, speech difficulty, weakness, light-headedness, numbness and headaches.   Hematological:  Negative for adenopathy. Does not bruise/bleed easily.   Psychiatric/Behavioral:  Negative for agitation, behavioral problems, confusion, decreased concentration, dysphoric mood, hallucinations, self-injury, sleep disturbance and suicidal ideas. The patient is not nervous/anxious and is not hyperactive.    All other systems reviewed and are negative.    Objective   /62 (BP Location: Left arm, Patient Position: Sitting, BP Cuff Size: Adult)   Pulse 68   Temp 36.2 °C (97.2 °F) (Temporal)   Resp 16   Ht 1.829 m (6')   Wt 98.3 kg (216 lb 12.8 oz)   SpO2 96%   BMI 29.40 kg/m²     Physical Exam  Vitals reviewed.   Constitutional:       General: He is not in acute distress.     Appearance: Normal appearance. He is normal weight. He is not ill-appearing or diaphoretic.   HENT:      Head: Normocephalic.      Right Ear: Tympanic membrane and external ear normal.      Left Ear: Tympanic membrane and external ear normal.      Nose: Nose normal. No congestion.      Mouth/Throat:      Pharynx: No posterior oropharyngeal erythema.   Eyes:      General:         Right eye: No discharge.         Left eye: No discharge.      Extraocular Movements: Extraocular movements intact.      Conjunctiva/sclera: Conjunctivae normal.      Pupils: Pupils are equal, round, and reactive to light.   Cardiovascular:      Rate and Rhythm: Normal rate and regular rhythm.      Pulses: Normal pulses.      Heart sounds: Normal heart sounds. No murmur heard.  Pulmonary:      Effort: Pulmonary effort is normal. No respiratory distress.      Breath sounds: Normal breath sounds. No wheezing or rales.   Chest:      Chest wall: No tenderness.   Abdominal:      General:  Abdomen is flat. Bowel sounds are normal. There is no distension.      Palpations: There is no mass.      Tenderness: There is no abdominal tenderness. There is no guarding.   Musculoskeletal:         General: No tenderness. Normal range of motion.      Cervical back: Normal range of motion and neck supple. No tenderness.      Right lower leg: No edema.      Left lower leg: No edema.   Skin:     General: Skin is dry.      Coloration: Skin is not jaundiced.      Findings: No bruising, erythema or rash.   Neurological:      General: No focal deficit present.      Mental Status: He is alert and oriented to person, place, and time. Mental status is at baseline.      Cranial Nerves: No cranial nerve deficit.      Sensory: No sensory deficit.      Coordination: Coordination normal.      Gait: Gait normal.   Psychiatric:         Mood and Affect: Mood normal.         Thought Content: Thought content normal.         Judgment: Judgment normal.     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    Relevant Orders    Follow Up In [facility] - PCP - Established    Hemoglobin A1C    Fatigue - Primary R53.83    Relevant Orders    CBC and Auto Differential    History of DVT (deep vein thrombosis) Z86.718    Relevant Orders    Follow Up [facility] - PCP - Established    Hyperlipidemia E78.5    Relevant Orders    Follow Up [facility] - PCP - Established    Comprehensive Metabolic Panel    Lipid Panel    Vitamin D deficiency E55.9    Relevant Orders    Follow Up [facility] - PCP - Established    Vitamin D 25-Hydroxy,Total (for eval of Vitamin D levels)    Diabetes mellitus type 2 without retinopathy (Multi) E11.9    Relevant Orders    Follow Up [facility]  - PCP - Established    Seborrheic keratosis L82.1    Primary hypertension I10    Relevant Orders    Follow Up [facility] - PCP - Established    Tremor R25.1    Relevant Orders    Follow Up [facility] - PCP - Established     Other Visit Diagnoses         Codes    Screening for prostate cancer     Z12.5    Relevant Orders    Prostate Specific Antigen, Screen              Pt will hold ozmepic to see if it helps with tremors    pt also stopped sotalol due to bradycardia per cardiologist    Lesion removal ( 2) via cryo treatment nose & arm       Check next     Patient does not feel breztri helped symptoms   patient is taking Trelegy but pt stopped     Consider CT A & P symptoms resolved     Urine & bowel incont     Improved with vesicare & proscar and surgery     Could not tolerate metformin       PSA q 6 months        Nuclear stress wnl 10/2022      ozempic  0.25 mg weekly  given  Pharmacy referral placed     surgery on back scheduled 1/31/24 with ..   restarted pregabalin 75 mg BID      Exercise at home on legs at shower 5-10 min  every day.     ECHO done 7/24/24  Holter and nuclear stress test in future via Dr. Julian Baker Attestation  By signing my name below, I, Olivia Nguyễn MA   attest that this documentation has been prepared under the direction and in the presence of Jose Manuel Estrada MD.    Provider Attestation - Scribe documentation    All medical record entries made by the Scribe were at my direction and personally dictated by me. I have reviewed the chart and agree that the record accurately reflects my personal performance of the history, physical exam, discussion and plan.    Continue current medications and therapy for chronic medical conditions      Patient ID: Francis Vang is a 78 y.o. male.    Destruction of lesion    Date/Time: 11/4/2024 11:58 AM    Performed by: Jose Manuel Estrada MD  Authorized by: Jose Manuel Estrada MD    Number of Lesions:  1  Lesion 1:     Body area: head/neck    Head/neck location: nose    Malignancy: benign lesion      Destruction method: cryotherapy

## 2024-11-18 ENCOUNTER — TELEPHONE (OUTPATIENT)
Dept: CARDIOLOGY | Facility: CLINIC | Age: 79
End: 2024-11-18
Payer: MEDICARE

## 2024-11-18 NOTE — TELEPHONE ENCOUNTER
14 day Teri [id number] does not require prior auth per the [employer] Customer ID: [id number].

## 2024-11-19 ENCOUNTER — APPOINTMENT (OUTPATIENT)
Dept: CARDIOLOGY | Facility: CLINIC | Age: 79
End: 2024-11-19
Payer: MEDICARE

## 2024-11-20 ENCOUNTER — ANCILLARY PROCEDURE (OUTPATIENT)
Dept: CARDIOLOGY | Facility: CLINIC | Age: 79
End: 2024-11-20
Payer: MEDICARE

## 2024-11-20 DIAGNOSIS — I48.0 PAROXYSMAL ATRIAL FIBRILLATION (MULTI): ICD-10-CM

## 2024-11-21 ENCOUNTER — APPOINTMENT (OUTPATIENT)
Dept: PHARMACY | Facility: HOSPITAL | Age: 79
End: 2024-11-21
Payer: MEDICARE

## 2024-11-21 DIAGNOSIS — E78.5 DM TYPE 2 WITH DIABETIC DYSLIPIDEMIA (MULTI): Primary | ICD-10-CM

## 2024-11-21 DIAGNOSIS — E11.69 DM TYPE 2 WITH DIABETIC DYSLIPIDEMIA (MULTI): Primary | ICD-10-CM

## 2024-11-21 DIAGNOSIS — I10 ESSENTIAL (PRIMARY) HYPERTENSION: ICD-10-CM

## 2024-11-21 NOTE — PROGRESS NOTES
Clinical Pharmacy Appointment    Patient ID: Francis Vang is a 78 y.o. male who presents for Diabetes.    Pt is here for Follow Up appointment.     Referring Provider: Jose Manuel Estrada MD  PCP: Jose Manuel Estrada MD   Last visit with PCP: 11/4/24   Next visit with PCP: 1/7/25      Subjective     HPI    DIABETES MELLITUS TYPE 2:    Diagnosed with diabetes: 20-25+ years ago. Known diabetic complications: none.  Does patient follow with Endocrinology: No  Last optometry exam: sees every year, last appt last month  Most recent visit in Podiatry: n/a-- patient denies sores or cuts on feet today      Current diabetic medications include:  Jardiance 25mg every day  Ozempic 0.5mg subcutaneous weekly (Friday)  Pioglitazone 45mg every day      Past diabetic medications include:  Metformin 500mg ER (could not tolerate)    Clarifications to above regimen: none  Adverse Effects: patient unsure if hand tremor was related to Ozempic. Ozempic was stopped for a week and believed to be unrelated. Otherwise tolerating well    Glucose Readings:  Glucometer/CGM Type: Test strips-OneTouch  Patient tests BG 1 times per day in the morning    Current home BG readings: 98-100mg/dL  Previous home BG readings: 90-120mg/dL    Any episodes of hypoglycemia? No, lowest was 99mg/dL .  Did patient treat episode of hypoglycemia appropriately? N/A  Does the patient have a prescription for ready-to-use Glucagon? No not on insulin  Does pt have proteinuria? No recent urine culture    Lifestyle:  Diet: 2 meals/day. Late breakfast and late lunch  BK: cereal with milk - cheerios, rice krispies, rye bread; eggs, toast, sausage, potatoes  LN: salads, potatoes, green beans, chicken, spaghetti    Drinks: coffee in the morning, tea, water, pop (occasionally during the summer)  Physical Activity: fixes stuff around the house; yard work    Secondary Prevention:  Statin? Yes- Atorvastatin 10mg- every day  ACE-I/ARB? Yes- Lisinopril 5mg - every day    Aspirin? No    Pertinent PMH Review:  PMH of Pancreatitis: No  PMH of Retinopathy: No  PMH of Urinary Tract Infections: No  PMH of MTC: No    Drug Interactions  No relevant drug interactions were noted.    Medication System Management  Patient's preferred pharmacy: [facility]  Adherence/Organization: last refilled in Sept   Affordability/Accessibility: none      Objective   Allergies   Allergen Reactions    Ragweed Pollen Other     Summer seasonal allergies     Social History     Social History Narrative    Not on file      Medication Review  Current Outpatient Medications   Medication Instructions    allopurinol (Zyloprim) 300 mg tablet TAKE 1 TABLET BY MOUTH EVERY DAY    atorvastatin (LIPITOR) 10 mg, oral, Daily    blood sugar diagnostic strip 1 strip, miscellaneous, 2 times daily    blood-glucose meter misc Needed for Glucose reading    cyanocobalamin (VITAMIN B-12) 1,000 mcg, oral, Daily    finasteride (PROSCAR) 5 mg, oral, Daily, Do not crush, chew, or split.     hydroCHLOROthiazide (HYDRODIURIL) 25 mg, oral, Daily    Jardiance 25 mg, oral, Daily    lisinopril 5 mg, oral, Daily    montelukast (Singulair) 10 mg tablet TAKE 1 TABLET BY MOUTH EVERYDAY AT BEDTIME    NIFEdipine ER (ADALAT CC) 60 mg, oral, Daily    nitroglycerin (NITROSTAT) 0.4 mg, Every 5 min PRN    OneTouch Delica Plus Lancet 33 gauge misc 2 EACH 2 TIMES A DAY.    pioglitazone (Actos) 45 mg tablet TAKE 1 TABLET BY MOUTH EVERY DAY    semaglutide 0.25 mg, subcutaneous, Weekly    solifenacin (VESICARE) 10 mg, oral, Daily, Swallow tablet whole; do not crush, chew, or split.    Xarelto 20 mg tablet TAKE 1 TABLET BY MOUTH EVERY DAY      Vitals  BP Readings from Last 2 Encounters:   11/04/24 124/62   10/03/24 110/52     BMI Readings from Last 1 Encounters:   11/04/24 29.40 kg/m²      Labs  A1C  Lab Results   Component Value Date    HGBA1C 7.4 (H) 10/02/2024    HGBA1C 7.6 (H) 07/02/2024    HGBA1C 7.3 (H) 04/02/2024     BMP  Lab Results   Component  "Value Date    CALCIUM 9.5 10/02/2024     10/02/2024    K 4.1 10/02/2024    CO2 31 10/02/2024     10/02/2024    BUN 32 (H) 10/02/2024    CREATININE 1.65 (H) 10/02/2024    EGFR 43 (L) 10/02/2024     LFTs  Lab Results   Component Value Date    ALT 9 (L) 10/02/2024    AST 13 10/02/2024    ALKPHOS 84 10/02/2024    BILITOT 0.7 10/02/2024     FLP  Lab Results   Component Value Date    TRIG 69 10/02/2024    CHOL 113 10/02/2024    LDLF 47 08/24/2023    LDLCALC 64 10/02/2024    HDL 35.3 10/02/2024     Urine Microalbumin  No results found for: \"MICROALBCREA\"    Weight Management  Wt Readings from Last 3 Encounters:   11/04/24 98.3 kg (216 lb 12.8 oz)   10/03/24 99.3 kg (219 lb)   10/01/24 98.9 kg (218 lb)      There is no height or weight on file to calculate BMI.     Assessment/Plan   Problem List Items Addressed This Visit       DM type 2 with diabetic dyslipidemia (Multi) - Primary     Patient's goal A1c is < 7%.  Is pt at goal? No, 7.4% (10/2024)  Patient's SMBGs are within normal range  Rationale for plan: Continue current medications, patient tolerating Ozempic well. Will continue current dose to help patient continue to get used to the medication prior to increasing.    Medication Changes:  CONTINUE:  Pioglitazone 45mg every day    Jardiance 25mg every day  Ozempic 0.5mg subcutaneous weekly. Patient has refill at preferred [facility]    Future Considerations:  Consider further dietary and lifestyle modifications  Check to see if tremors are still present  Increasing dose of Ozempic to 1mg at next visit    Diabetes Education  Rule of 15: eating ~15 g of carbs when BG less than 80 (half cup juice, 3-4 glucose tabs).  Recognize symptoms of high and low blood sugar.   Eat a realistic healthy diet consisting of fruits, vegetables, fiber, protein food choices on a regular basis and be aware of portion/serving sizes. Reduce carbohydrate consumption and always consume with protein and fat. Avoid foods high in " saturated/trans fat, high salt content, and sweets and beverages with added sugars.  Limit alcohol consumption; alcohol may affect your blood sugar and cause hypoglycemia.   Stay active and incorporate ~30 mins of exercise into your daily routine to manage your weight and increase the body's acceptance of insulin.    PATIENT GOALS   Fasting B - 130 mg/dL 2-HR Postprandial BG:   Less than 180 mg/dL A1c:   Less than 7.0 %     Ozempic Education:  Counseled patient on Ozempic MOA, expectations, side effects, duration of therapy, administration, and monitoring parameters.  Counseled patient on the benefits of GLP-1ra, such as cardiovascular risk reduction, glycemic control, and weight loss potential.  Provided detailed dosing and administration counseling to ensure proper technique.   Reviewed Ozempic titration schedule, starting with 0.25 mg once weekly to 0.5 mg, 1 mg, and if tolerated 2 mg.  Reviewed storage requirements of Ozempic when not in use, and when to administer the medication if a dose is missed.  Discussed risks of GLP1ra including risk of pancreatitis, MTC and worsening of DR  Advised patient that they may experience improved satiety after meals and portion sizes of meals may be reduced as doses of Ozempic increase.     Empagliflozin (Jardiance) Education:  Counseled patient on Empagliflozin (Jardiance) MOA, expectations, side effects, duration of therapy, administration, and monitoring parameters.  Reviewed the benefits of SGLT-2i therapy, such as glycemic control and kidney and CV protection.  Advised patient to practice proper  hygiene to reduce risk of UTIs or yeast infections.  Advised patient to maintain adequate fluid intake to remain hydrated while on SGLT2i therapy.  Answered all patient questions and concerns.         Relevant Orders    Referral to Clinical Pharmacy       Clinical Pharmacist follow-up: 9:20am 2024, Telehealth visit    Continue all meds under the continuation of  care with the referring provider and clinical pharmacy team.    Refill for Lisinopril has been requested and order is pended to Dr. Estrada.    Thank you,  Coral Fernandez  Clinical Pharmacist  [phone]    Verbal consent to manage patient's drug therapy was obtained from the patient. They were informed they may decline to participate or withdraw from participation in pharmacy services at any time.

## 2024-11-21 NOTE — ASSESSMENT & PLAN NOTE
Patient's goal A1c is < 7%.  Is pt at goal? No, 7.4% (10/2024)  Patient's SMBGs are within normal range  Rationale for plan: Continue current medications, patient tolerating Ozempic well. Will continue current dose to help patient continue to get used to the medication prior to increasing.    Medication Changes:  CONTINUE:  Pioglitazone 45mg every day    Jardiance 25mg every day  Ozempic 0.5mg subcutaneous weekly. Patient has refill at preferred [facility]    Future Considerations:  Consider further dietary and lifestyle modifications  Check to see if tremors are still present  Increasing dose of Ozempic to 1mg at next visit    Diabetes Education  Rule of 15: eating ~15 g of carbs when BG less than 80 (half cup juice, 3-4 glucose tabs).  Recognize symptoms of high and low blood sugar.   Eat a realistic healthy diet consisting of fruits, vegetables, fiber, protein food choices on a regular basis and be aware of portion/serving sizes. Reduce carbohydrate consumption and always consume with protein and fat. Avoid foods high in saturated/trans fat, high salt content, and sweets and beverages with added sugars.  Limit alcohol consumption; alcohol may affect your blood sugar and cause hypoglycemia.   Stay active and incorporate ~30 mins of exercise into your daily routine to manage your weight and increase the body's acceptance of insulin.    PATIENT GOALS   Fasting B - 130 mg/dL 2-HR Postprandial BG:   Less than 180 mg/dL A1c:   Less than 7.0 %     Ozempic Education:  Counseled patient on Ozempic MOA, expectations, side effects, duration of therapy, administration, and monitoring parameters.  Counseled patient on the benefits of GLP-1ra, such as cardiovascular risk reduction, glycemic control, and weight loss potential.  Provided detailed dosing and administration counseling to ensure proper technique.   Reviewed Ozempic titration schedule, starting with 0.25 mg once weekly to 0.5 mg, 1 mg, and if tolerated 2  mg.  Reviewed storage requirements of Ozempic when not in use, and when to administer the medication if a dose is missed.  Discussed risks of GLP1ra including risk of pancreatitis, MTC and worsening of DR  Advised patient that they may experience improved satiety after meals and portion sizes of meals may be reduced as doses of Ozempic increase.     Empagliflozin (Jardiance) Education:  Counseled patient on Empagliflozin (Jardiance) MOA, expectations, side effects, duration of therapy, administration, and monitoring parameters.  Reviewed the benefits of SGLT-2i therapy, such as glycemic control and kidney and CV protection.  Advised patient to practice proper  hygiene to reduce risk of UTIs or yeast infections.  Advised patient to maintain adequate fluid intake to remain hydrated while on SGLT2i therapy.  Answered all patient questions and concerns.

## 2024-11-21 NOTE — TELEPHONE ENCOUNTER
Recent Visits  Date Type Provider Dept   11/04/24 Office Visit Jose Manuel Estrada MD [facility]   10/03/24 Office Visit Jose Manuel Estrada MD [facility]   07/03/24 Office Visit Jose Manuel Estrada MD [facility]   Showing recent visits within past 180 days and meeting all other requirements  Future Appointments  Date Type Provider Dept   01/07/25 Appointment Jose Manuel Estrada MD [facility]   Showing future appointments within next 90 days and meeting all other requirements

## 2024-11-22 RX ORDER — LISINOPRIL 5 MG/1
5 TABLET ORAL DAILY
Qty: 90 TABLET | Refills: 1 | Status: SHIPPED | OUTPATIENT
Start: 2024-11-22

## 2024-11-29 ENCOUNTER — APPOINTMENT (OUTPATIENT)
Dept: CARDIOLOGY | Facility: CLINIC | Age: 79
End: 2024-11-29
Payer: MEDICARE

## 2024-12-03 ENCOUNTER — APPOINTMENT (OUTPATIENT)
Dept: CARDIOLOGY | Facility: CLINIC | Age: 79
End: 2024-12-03
Payer: MEDICARE

## 2024-12-11 PROCEDURE — 93248 EXT ECG>7D<15D REV&INTERPJ: CPT | Performed by: INTERNAL MEDICINE

## 2024-12-13 ENCOUNTER — APPOINTMENT (OUTPATIENT)
Dept: PHARMACY | Facility: HOSPITAL | Age: 79
End: 2024-12-13
Payer: MEDICARE

## 2024-12-13 DIAGNOSIS — E11.69 DM TYPE 2 WITH DIABETIC DYSLIPIDEMIA (MULTI): ICD-10-CM

## 2024-12-13 DIAGNOSIS — E78.5 DM TYPE 2 WITH DIABETIC DYSLIPIDEMIA (MULTI): ICD-10-CM

## 2024-12-13 NOTE — ASSESSMENT & PLAN NOTE
Patient's goal A1c is < 7%.  Is pt at goal? No, 7.4% (10/2024)  Patient's SMBGs are within normal range  Rationale for plan: Continue current medications, patient tolerating Ozempic well. Will continue current dose as patient is experiencing some acid reflux.    Medication Changes:  CONTINUE:  Pioglitazone 45mg every day    Jardiance 25mg every day  Ozempic 0.5mg subcutaneous weekly. Will send refill to patients preferred [facility].    Future Considerations:  Consider further dietary and lifestyle modifications  Check to see if tremors are still present  Increasing dose of Ozempic to 1mg at next visit    Diabetes Education  Rule of 15: eating ~15 g of carbs when BG less than 80 (half cup juice, 3-4 glucose tabs).  Recognize symptoms of high and low blood sugar.   Eat a realistic healthy diet consisting of fruits, vegetables, fiber, protein food choices on a regular basis and be aware of portion/serving sizes. Reduce carbohydrate consumption and always consume with protein and fat. Avoid foods high in saturated/trans fat, high salt content, and sweets and beverages with added sugars.  Limit alcohol consumption; alcohol may affect your blood sugar and cause hypoglycemia.   Stay active and incorporate ~30 mins of exercise into your daily routine to manage your weight and increase the body's acceptance of insulin.    PATIENT GOALS   Fasting B - 130 mg/dL 2-HR Postprandial BG:   Less than 180 mg/dL A1c:   Less than 7.0 %     Ozempic Education:  Counseled patient on Ozempic MOA, expectations, side effects, duration of therapy, administration, and monitoring parameters.  Counseled patient on the benefits of GLP-1ra, such as cardiovascular risk reduction, glycemic control, and weight loss potential.  Provided detailed dosing and administration counseling to ensure proper technique.   Reviewed Ozempic titration schedule, starting with 0.25 mg once weekly to 0.5 mg, 1 mg, and if tolerated 2 mg.  Reviewed storage  requirements of Ozempic when not in use, and when to administer the medication if a dose is missed.  Discussed risks of GLP1ra including risk of pancreatitis, MTC and worsening of DR  Advised patient that they may experience improved satiety after meals and portion sizes of meals may be reduced as doses of Ozempic increase.     Empagliflozin (Jardiance) Education:  Counseled patient on Empagliflozin (Jardiance) MOA, expectations, side effects, duration of therapy, administration, and monitoring parameters.  Reviewed the benefits of SGLT-2i therapy, such as glycemic control and kidney and CV protection.  Advised patient to practice proper  hygiene to reduce risk of UTIs or yeast infections.  Advised patient to maintain adequate fluid intake to remain hydrated while on SGLT2i therapy.  Answered all patient questions and concerns.

## 2024-12-13 NOTE — PROGRESS NOTES
Clinical Pharmacy Appointment    Patient ID: Francis Vang is a 79 y.o. male who presents for Diabetes.    Pt is here for Follow Up appointment.     Referring Provider: Jose Manuel Estrada MD  PCP: Jose Manuel Estrada MD   Last visit with PCP: 11/4/24   Next visit with PCP: 1/7/25      Subjective     HPI    DIABETES MELLITUS TYPE 2:    Diagnosed with diabetes: 20-25+ years ago. Known diabetic complications: none.  Does patient follow with Endocrinology: No  Last optometry exam: sees every year, last appt last month  Most recent visit in Podiatry: n/a-- patient denies sores or cuts on feet today      Current diabetic medications include:  Jardiance 25mg every day  Ozempic 0.5mg subcutaneous weekly (Friday)  Pioglitazone 45mg every day      Past diabetic medications include:  Metformin 500mg ER (could not tolerate)    Clarifications to above regimen: none  Adverse Effects: hand tremor when writing (started after Ozempic but Ozempic was discontinued for a few weeks and hand tremor persisted, likely not related); acid reflux    Glucose Readings:  Glucometer/CGM Type: Test strips-OneTouch  Patient tests BG 1 times per day in the morning    Current home BG readings: 90-115mg/dL  Previous home BG readings: 90-120mg/dL    Any episodes of hypoglycemia? No, lowest was 90mg/dL .  Did patient treat episode of hypoglycemia appropriately? N/A  Does the patient have a prescription for ready-to-use Glucagon? No not on insulin  Does pt have proteinuria? No recent urine culture    Lifestyle:  Diet: 2 meals/day. Late breakfast and late lunch  BK: cereal with milk - cheerios, rice krispies, rye bread; eggs, toast, sausage, potatoes  LN: salads, potatoes, green beans, chicken, spaghetti    Drinks: coffee in the morning, tea, water, pop (occasionally during the summer)  Physical Activity: fixes stuff around the house; yard work    Secondary Prevention:  Statin? Yes- Atorvastatin 10mg- every day  ACE-I/ARB? Yes- Lisinopril 5mg - every  day   Aspirin? No    Pertinent PMH Review:  PMH of Pancreatitis: No  PMH of Retinopathy: No  PMH of Urinary Tract Infections: No  PMH of MTC: No    Drug Interactions  No relevant drug interactions were noted.    Medication System Management  Patient's preferred pharmacy: [facility]  Adherence/Organization: last refilled in Sept   Affordability/Accessibility: none      Objective   Allergies   Allergen Reactions    Ragweed Pollen Other     Summer seasonal allergies     Social History     Social History Narrative    Not on file      Medication Review  Current Outpatient Medications   Medication Instructions    allopurinol (Zyloprim) 300 mg tablet TAKE 1 TABLET BY MOUTH EVERY DAY    atorvastatin (LIPITOR) 10 mg, oral, Daily    blood sugar diagnostic strip 1 strip, miscellaneous, 2 times daily    blood-glucose meter misc Needed for Glucose reading    cyanocobalamin (VITAMIN B-12) 1,000 mcg, oral, Daily    finasteride (PROSCAR) 5 mg, oral, Daily, Do not crush, chew, or split.     hydroCHLOROthiazide (HYDRODIURIL) 25 mg, oral, Daily    Jardiance 25 mg, oral, Daily    lisinopril 5 mg, oral, Daily    montelukast (Singulair) 10 mg tablet TAKE 1 TABLET BY MOUTH EVERYDAY AT BEDTIME    NIFEdipine ER (ADALAT CC) 60 mg, oral, Daily    nitroglycerin (NITROSTAT) 0.4 mg, Every 5 min PRN    OneTouch Delica Plus Lancet 33 gauge misc 2 EACH 2 TIMES A DAY.    pioglitazone (Actos) 45 mg tablet TAKE 1 TABLET BY MOUTH EVERY DAY    semaglutide 0.5 mg, subcutaneous, Weekly    solifenacin (VESICARE) 10 mg, oral, Daily, Swallow tablet whole; do not crush, chew, or split.    Xarelto 20 mg tablet TAKE 1 TABLET BY MOUTH EVERY DAY      Vitals  BP Readings from Last 2 Encounters:   11/04/24 124/62   10/03/24 110/52     BMI Readings from Last 1 Encounters:   11/04/24 29.40 kg/m²      Labs  A1C  Lab Results   Component Value Date    HGBA1C 7.4 (H) 10/02/2024    HGBA1C 7.6 (H) 07/02/2024    HGBA1C 7.3 (H) 04/02/2024     BMP  Lab Results   Component  "Value Date    CALCIUM 9.5 10/02/2024     10/02/2024    K 4.1 10/02/2024    CO2 31 10/02/2024     10/02/2024    BUN 32 (H) 10/02/2024    CREATININE 1.65 (H) 10/02/2024    EGFR 43 (L) 10/02/2024     LFTs  Lab Results   Component Value Date    ALT 9 (L) 10/02/2024    AST 13 10/02/2024    ALKPHOS 84 10/02/2024    BILITOT 0.7 10/02/2024     FLP  Lab Results   Component Value Date    TRIG 69 10/02/2024    CHOL 113 10/02/2024    LDLF 47 08/24/2023    LDLCALC 64 10/02/2024    HDL 35.3 10/02/2024     Urine Microalbumin  No results found for: \"MICROALBCREA\"    Weight Management  Wt Readings from Last 3 Encounters:   11/04/24 98.3 kg (216 lb 12.8 oz)   10/03/24 99.3 kg (219 lb)   10/01/24 98.9 kg (218 lb)      There is no height or weight on file to calculate BMI.     Assessment/Plan   Problem List Items Addressed This Visit       DM type 2 with diabetic dyslipidemia (Multi)     Patient's goal A1c is < 7%.  Is pt at goal? No, 7.4% (10/2024)  Patient's SMBGs are within normal range  Rationale for plan: Continue current medications, patient tolerating Ozempic well. Will continue current dose as patient is experiencing some acid reflux.    Medication Changes:  CONTINUE:  Pioglitazone 45mg every day    Jardiance 25mg every day  Ozempic 0.5mg subcutaneous weekly. Will send refill to patients preferred [facility].    Future Considerations:  Consider further dietary and lifestyle modifications  Check to see if tremors are still present  Increasing dose of Ozempic to 1mg at next visit    Diabetes Education  Rule of 15: eating ~15 g of carbs when BG less than 80 (half cup juice, 3-4 glucose tabs).  Recognize symptoms of high and low blood sugar.   Eat a realistic healthy diet consisting of fruits, vegetables, fiber, protein food choices on a regular basis and be aware of portion/serving sizes. Reduce carbohydrate consumption and always consume with protein and fat. Avoid foods high in saturated/trans fat, high salt " content, and sweets and beverages with added sugars.  Limit alcohol consumption; alcohol may affect your blood sugar and cause hypoglycemia.   Stay active and incorporate ~30 mins of exercise into your daily routine to manage your weight and increase the body's acceptance of insulin.    PATIENT GOALS   Fasting B - 130 mg/dL 2-HR Postprandial BG:   Less than 180 mg/dL A1c:   Less than 7.0 %     Ozempic Education:  Counseled patient on Ozempic MOA, expectations, side effects, duration of therapy, administration, and monitoring parameters.  Counseled patient on the benefits of GLP-1ra, such as cardiovascular risk reduction, glycemic control, and weight loss potential.  Provided detailed dosing and administration counseling to ensure proper technique.   Reviewed Ozempic titration schedule, starting with 0.25 mg once weekly to 0.5 mg, 1 mg, and if tolerated 2 mg.  Reviewed storage requirements of Ozempic when not in use, and when to administer the medication if a dose is missed.  Discussed risks of GLP1ra including risk of pancreatitis, MTC and worsening of DR  Advised patient that they may experience improved satiety after meals and portion sizes of meals may be reduced as doses of Ozempic increase.     Empagliflozin (Jardiance) Education:  Counseled patient on Empagliflozin (Jardiance) MOA, expectations, side effects, duration of therapy, administration, and monitoring parameters.  Reviewed the benefits of SGLT-2i therapy, such as glycemic control and kidney and CV protection.  Advised patient to practice proper  hygiene to reduce risk of UTIs or yeast infections.  Advised patient to maintain adequate fluid intake to remain hydrated while on SGLT2i therapy.  Answered all patient questions and concerns.         Relevant Medications    semaglutide 0.25 mg or 0.5 mg (2 mg/3 mL) pen injector    Other Relevant Orders    Referral to Clinical Pharmacy       Clinical Pharmacist follow-up: 9:00am 1/10/2025, [facility]  visit    Continue all meds under the continuation of care with the referring provider and clinical pharmacy team.    Refill for Lisinopril has been requested and order is pended to Dr. Estrada.    Thank you,  Kimberly Koch, PharmD  Clinical Pharmacist  [phone]    Verbal consent to manage patient's drug therapy was obtained from the patient. They were informed they may decline to participate or withdraw from participation in pharmacy services at any time.

## 2024-12-20 ENCOUNTER — APPOINTMENT (OUTPATIENT)
Dept: CARDIOLOGY | Facility: CLINIC | Age: 79
End: 2024-12-20
Payer: MEDICARE

## 2024-12-20 VITALS
BODY MASS INDEX: 28.99 KG/M2 | OXYGEN SATURATION: 94 % | DIASTOLIC BLOOD PRESSURE: 68 MMHG | SYSTOLIC BLOOD PRESSURE: 139 MMHG | WEIGHT: 214 LBS | HEIGHT: 72 IN | HEART RATE: 70 BPM

## 2024-12-20 DIAGNOSIS — I49.9 CARDIAC ARRHYTHMIA, UNSPECIFIED CARDIAC ARRHYTHMIA TYPE: Primary | ICD-10-CM

## 2024-12-20 DIAGNOSIS — G25.0 BENIGN ESSENTIAL TREMOR: ICD-10-CM

## 2024-12-20 PROCEDURE — 1123F ACP DISCUSS/DSCN MKR DOCD: CPT | Performed by: INTERNAL MEDICINE

## 2024-12-20 PROCEDURE — 3078F DIAST BP <80 MM HG: CPT | Performed by: INTERNAL MEDICINE

## 2024-12-20 PROCEDURE — 99213 OFFICE O/P EST LOW 20 MIN: CPT | Performed by: INTERNAL MEDICINE

## 2024-12-20 PROCEDURE — 3075F SYST BP GE 130 - 139MM HG: CPT | Performed by: INTERNAL MEDICINE

## 2024-12-20 PROCEDURE — 1159F MED LIST DOCD IN RCRD: CPT | Performed by: INTERNAL MEDICINE

## 2024-12-20 PROCEDURE — 1036F TOBACCO NON-USER: CPT | Performed by: INTERNAL MEDICINE

## 2024-12-20 PROCEDURE — 1125F AMNT PAIN NOTED PAIN PRSNT: CPT | Performed by: INTERNAL MEDICINE

## 2024-12-20 PROCEDURE — 93000 ELECTROCARDIOGRAM COMPLETE: CPT | Performed by: INTERNAL MEDICINE

## 2024-12-20 RX ORDER — METOPROLOL SUCCINATE 25 MG/1
25 TABLET, EXTENDED RELEASE ORAL DAILY
Qty: 30 TABLET | Refills: 11 | Status: SHIPPED | OUTPATIENT
Start: 2024-12-20 | End: 2025-12-20

## 2024-12-20 ASSESSMENT — PAIN SCALES - GENERAL: PAINLEVEL_OUTOF10: 2

## 2024-12-20 NOTE — PROGRESS NOTES
Referring Provider: Jose Manuel Estrada MD  Reason for Consult: Fatigue    History of Present Illness:      Francis Vang is a 79 y.o. 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 Estrada for fatigue.    Has had an irregular heartbeat since his was a kid.  At some point, he was started on sotalol.  He states that he has been on sotalol for at least 20 years, possibly longer.  It was started for this irregular heartbeat, but there is no more specific diagnosis for this.  He has had increased dyspnea on exertion over the past year or so. Has to take a lot of breaks recently. Feels short of breath very quickly with minimal activity, such as walking up the slope of his driveway. This year, he has not been able to do the whole front yard because of fatigue and shortness of breath. Has also had episodes where he is getting lightheaded, which usually resolves with rest.    Since his last visit, he says that he feels very well.  No cardiac symptoms.  He does note that since stopping sotalol, he has noted some tremors coming back.  We did a 14-day monitor which showed no clinically significant arrhythmia.  Also ordered a nuclear stress test at his last visit which was normal.    Focused Cardiovascular Problem List:  Type 2 diabetes  Hyperlipidemia  Hypertension  Prior DVT x 2 on Xarelto    Past Medical and Surgical History:  Mr. Vang  has a past medical history of Anemia, Arthritis, Asthma, Cataracts, bilateral, Chronic anticoagulation, CKD (chronic kidney disease) stage 3, GFR 30-59 ml/min (Multi), COPD (chronic obstructive pulmonary disease) (Multi), Deep vein thrombosis (DVT) of lower extremity (Multi) (04/22/2013), Diabetes mellitus (Multi), DVT (deep venous thrombosis) (Multi), Encounter for general adult medical examination without abnormal findings (08/13/2020), GERD  (gastroesophageal reflux disease), Gout, History of DVT (deep vein thrombosis), Hyperlipidemia, Hypertension, Neurogenic claudication, OA (osteoarthritis), RAD (reactive airway disease) ([facility]-HCC), Radiculopathy, Sleep apnea, and Spinal stenosis.    has a past surgical history that includes Other surgical history (05/10/2019); Other surgical history (05/10/2019); Colonoscopy; Other surgical history; and Multiple tooth extractions.    Social History:  Social History     Tobacco Use    Smoking status: Never    Smokeless tobacco: Never   Substance Use Topics    Alcohol use: Not Currently      Tobacco: Denies  Alcohol: Denies  Drug use:  Denies  Occupational History: Retired  Other: Daughter is Mercy Vang who works at the [employer]    Relevant Family History:   Family History   Problem Relation Name Age of Onset    Cancer Mother      Hypertension Father      Cancer Sister      Seizures Sister       Allergies:  Allergies   Allergen Reactions    Ragweed Pollen Other     Summer seasonal allergies        Medications:  Current Outpatient Medications   Medication Instructions    allopurinol (Zyloprim) 300 mg tablet TAKE 1 TABLET BY MOUTH EVERY DAY    atorvastatin (LIPITOR) 10 mg, oral, Daily    blood sugar diagnostic strip 1 strip, miscellaneous, 2 times daily    blood-glucose meter misc Needed for Glucose reading    cyanocobalamin (VITAMIN B-12) 1,000 mcg, oral, Daily    finasteride (PROSCAR) 5 mg, oral, Daily, Do not crush, chew, or split.     hydroCHLOROthiazide (HYDRODIURIL) 25 mg, oral, Daily    Jardiance 25 mg, oral, Daily    lisinopril 5 mg, oral, Daily    montelukast (Singulair) 10 mg tablet TAKE 1 TABLET BY MOUTH EVERYDAY AT BEDTIME    NIFEdipine ER (ADALAT CC) 60 mg, oral, Daily    nitroglycerin (NITROSTAT) 0.4 mg, Every 5 min PRN    OneTouch Delica Plus Lancet 33 gauge misc 2 EACH 2 TIMES A DAY.    pioglitazone (Actos) 45 mg tablet TAKE 1 TABLET BY MOUTH EVERY DAY    semaglutide 0.5 mg,  "subcutaneous, Weekly    solifenacin (VESICARE) 10 mg, oral, Daily, Swallow tablet whole; do not crush, chew, or split.    Xarelto 20 mg tablet TAKE 1 TABLET BY MOUTH EVERY DAY         Objective   Physical Exam:  Last Recorded Vitals:      4/3/2024     9:04 AM 5/16/2024    10:05 AM 7/3/2024     8:40 AM 10/1/2024     8:38 AM 10/3/2024     9:33 AM 11/4/2024    10:47 AM 12/20/2024     2:50 PM   Vitals   Systolic 112 130 108 130 110 124 139   Diastolic 52 64 62 70 52 62 68   BP Location Left arm Right arm Left arm  Right arm Left arm Right arm   Heart Rate 42 48 50 46 52 68 70   Temp    35.4 °C (95.8 °F)  36.2 °C (97.2 °F)    Resp 16  16 16 16 16    Height 1.854 m (6' 1\") 1.854 m (6' 1\") 1.854 m (6' 1\") 1.829 m (6') 1.829 m (6') 1.829 m (6') 1.829 m (6')   Weight (lb) 218.6 221.8 218 218 219 216.8 214   BMI 28.84 kg/m2 29.26 kg/m2 28.76 kg/m2 29.57 kg/m2 29.7 kg/m2 29.4 kg/m2 29.02 kg/m2   BSA (m2) 2.26 m2 2.28 m2 2.26 m2 2.24 m2 2.25 m2 2.23 m2 2.22 m2   Visit Report Report Report Report Report Report Report Report    Visit Vitals  /68 (BP Location: Right arm, Patient Position: Sitting, BP Cuff Size: Adult)   Pulse 70   Ht 1.829 m (6')   Wt 97.1 kg (214 lb)   SpO2 94%   BMI 29.02 kg/m²   Smoking Status Never   BSA 2.22 m²      Gen: NAD, sitting comfortably  HEENT: NC/AT  Card: Bradycardic, no m/r/g  Pulm: Clear to auscultation bilaterally  Ext: No LE edema  Neuro: No focal deficits    Diagnostic Results      My Interpretation of Reviewed Study(s):  Prior ECGs (reviewed and my interpretation):  12/20/2024: Normal sinus rhythm, left axis deviation, otherwise normal EKG  10/1/2024: Sinus bradycardia, left axis deviation, ST and T wave abnormalities, QTc 462 ms        Echocardiography:  7/24/2024: Normal LVEF, moderate concentric LVH, mild to moderately dilated left atrium, otherwise normal heart       Stress Test:  10/17/2024: Normal nuclear stress test with no inducible ischemia  10/2022: Normal nuclear stress test " without inducible ischemia    Cardiac event monitor:  11/20/2024: 14-day monitor with no clinically significant arrhythmia      Relevant Labs:  Lab Results   Component Value Date    CREATININE 1.65 (H) 10/02/2024    CREATININE 1.71 (H) 07/02/2024    CREATININE 1.68 (H) 04/02/2024    K 4.1 10/02/2024    K 4.1 07/02/2024    K 3.8 04/02/2024    HGBA1C 7.4 (H) 10/02/2024    HGBA1C 7.6 (H) 07/02/2024    HGBA1C 7.3 (H) 04/02/2024    HGB 12.8 (L) 10/02/2024    HGB 12.6 (L) 07/02/2024    HGB 11.8 (L) 04/02/2024    INR 1.9 (H) 12/04/2023    INR 1.0 09/08/2020    INR 1.1 09/08/2020    AST 13 10/02/2024    AST 12 07/02/2024    AST 12 04/02/2024    ALT 9 (L) 10/02/2024    ALT 10 07/02/2024    ALT 8 (L) 04/02/2024       Assessment/Plan   Assessment and Plan:  Francis Vang is a 79 y.o. year old male patient who is referred for management and evaluation of increased dyspnea on exertion and fatigue.  He has a history of an irregular heart rhythm for which he has been on sotalol for many years.  It is not clear to me what exactly this irregular heart rhythm was.    After stopping sotalol, his symptoms have improved, his resting bradycardia has significantly improved his heart rate today is at rest are in the 60s.  However he does report a trouble some tremor that has started since he stopped sotalol.  It may be that the beta-blocker was helping a little bit with his tremor.  As he has had improvement off beta-blockers, I would not want to reinstitute beta-blockers at a high dose.  But we will try 25 mg of metoprolol XL and see if it helps with the tremors.  If it does not, or if it causes bradycardia, I told him to discontinue it.    Otherwise, from the electrophysiologic standpoint, he no longer needs to follow-up with me, but would be happy to see him on as-needed basis.         Return to Clinic:  As needed    Thank you very much for allowing me to participate in the care of this patient. Please do not hesitate to contact me  with any further questions or concerns.    Katey Jordan MD  Clinical Cardiac Electrophysiologist, [facility] [place]    of Medicine, [employer] [facility] [employer]  Director of Atrial Fibrillation Ablation, [facility]  Director of Ventricular Arrhythmias Research, [facility]  Office Phone Number: [phone]

## 2024-12-31 DIAGNOSIS — M96.1 POST LAMINECTOMY SYNDROME: ICD-10-CM

## 2025-01-06 ENCOUNTER — LAB (OUTPATIENT)
Dept: LAB | Facility: LAB | Age: 80
End: 2025-01-06
Payer: MEDICARE

## 2025-01-06 DIAGNOSIS — R53.83 FATIGUE, UNSPECIFIED TYPE: ICD-10-CM

## 2025-01-06 DIAGNOSIS — E11.69 DM TYPE 2 WITH DIABETIC DYSLIPIDEMIA (MULTI): ICD-10-CM

## 2025-01-06 DIAGNOSIS — E55.9 VITAMIN D DEFICIENCY: ICD-10-CM

## 2025-01-06 DIAGNOSIS — Z12.5 SCREENING FOR PROSTATE CANCER: ICD-10-CM

## 2025-01-06 DIAGNOSIS — E78.2 MIXED HYPERLIPIDEMIA: ICD-10-CM

## 2025-01-06 DIAGNOSIS — E78.5 DM TYPE 2 WITH DIABETIC DYSLIPIDEMIA (MULTI): ICD-10-CM

## 2025-01-06 LAB
25(OH)D3 SERPL-MCNC: 33 NG/ML (ref 30–100)
ALBUMIN SERPL BCP-MCNC: 3.8 G/DL (ref 3.4–5)
ALP SERPL-CCNC: 83 U/L (ref 33–136)
ALT SERPL W P-5'-P-CCNC: 8 U/L (ref 10–52)
ANION GAP SERPL CALC-SCNC: 12 MMOL/L (ref 10–20)
AST SERPL W P-5'-P-CCNC: 11 U/L (ref 9–39)
BASOPHILS # BLD AUTO: 0.09 X10*3/UL (ref 0–0.1)
BASOPHILS NFR BLD AUTO: 1.3 %
BILIRUB SERPL-MCNC: 0.5 MG/DL (ref 0–1.2)
BUN SERPL-MCNC: 38 MG/DL (ref 6–23)
CALCIUM SERPL-MCNC: 9.7 MG/DL (ref 8.6–10.3)
CHLORIDE SERPL-SCNC: 104 MMOL/L (ref 98–107)
CHOLEST SERPL-MCNC: 99 MG/DL (ref 0–199)
CHOLESTEROL/HDL RATIO: 3
CO2 SERPL-SCNC: 28 MMOL/L (ref 21–32)
CREAT SERPL-MCNC: 1.87 MG/DL (ref 0.5–1.3)
EGFRCR SERPLBLD CKD-EPI 2021: 36 ML/MIN/1.73M*2
EOSINOPHIL # BLD AUTO: 0.3 X10*3/UL (ref 0–0.4)
EOSINOPHIL NFR BLD AUTO: 4.2 %
ERYTHROCYTE [DISTWIDTH] IN BLOOD BY AUTOMATED COUNT: 16.2 % (ref 11.5–14.5)
EST. AVERAGE GLUCOSE BLD GHB EST-MCNC: 134 MG/DL
GLUCOSE SERPL-MCNC: 126 MG/DL (ref 74–99)
HBA1C MFR BLD: 6.3 %
HCT VFR BLD AUTO: 36.7 % (ref 41–52)
HDLC SERPL-MCNC: 32.8 MG/DL
HGB BLD-MCNC: 11.8 G/DL (ref 13.5–17.5)
IMM GRANULOCYTES # BLD AUTO: 0.03 X10*3/UL (ref 0–0.5)
IMM GRANULOCYTES NFR BLD AUTO: 0.4 % (ref 0–0.9)
LDLC SERPL CALC-MCNC: 50 MG/DL
LYMPHOCYTES # BLD AUTO: 2.21 X10*3/UL (ref 0.8–3)
LYMPHOCYTES NFR BLD AUTO: 31.3 %
MCH RBC QN AUTO: 31.8 PG (ref 26–34)
MCHC RBC AUTO-ENTMCNC: 32.2 G/DL (ref 32–36)
MCV RBC AUTO: 99 FL (ref 80–100)
MONOCYTES # BLD AUTO: 0.93 X10*3/UL (ref 0.05–0.8)
MONOCYTES NFR BLD AUTO: 13.2 %
NEUTROPHILS # BLD AUTO: 3.5 X10*3/UL (ref 1.6–5.5)
NEUTROPHILS NFR BLD AUTO: 49.6 %
NON HDL CHOLESTEROL: 66 MG/DL (ref 0–149)
NRBC BLD-RTO: 0 /100 WBCS (ref 0–0)
PLATELET # BLD AUTO: 242 X10*3/UL (ref 150–450)
POTASSIUM SERPL-SCNC: 4 MMOL/L (ref 3.5–5.3)
PROT SERPL-MCNC: 6.8 G/DL (ref 6.4–8.2)
PSA SERPL-MCNC: 1.42 NG/ML
RBC # BLD AUTO: 3.71 X10*6/UL (ref 4.5–5.9)
SODIUM SERPL-SCNC: 140 MMOL/L (ref 136–145)
TRIGL SERPL-MCNC: 81 MG/DL (ref 0–149)
VLDL: 16 MG/DL (ref 0–40)
WBC # BLD AUTO: 7.1 X10*3/UL (ref 4.4–11.3)

## 2025-01-06 PROCEDURE — 80061 LIPID PANEL: CPT

## 2025-01-06 PROCEDURE — 80053 COMPREHEN METABOLIC PANEL: CPT

## 2025-01-06 PROCEDURE — G0103 PSA SCREENING: HCPCS

## 2025-01-06 PROCEDURE — 85025 COMPLETE CBC W/AUTO DIFF WBC: CPT

## 2025-01-06 PROCEDURE — 82306 VITAMIN D 25 HYDROXY: CPT

## 2025-01-06 PROCEDURE — 83036 HEMOGLOBIN GLYCOSYLATED A1C: CPT

## 2025-01-07 ENCOUNTER — APPOINTMENT (OUTPATIENT)
Dept: PRIMARY CARE | Facility: CLINIC | Age: 80
End: 2025-01-07
Payer: MEDICARE

## 2025-01-07 VITALS
TEMPERATURE: 97 F | OXYGEN SATURATION: 95 % | DIASTOLIC BLOOD PRESSURE: 74 MMHG | HEIGHT: 72 IN | WEIGHT: 210.8 LBS | SYSTOLIC BLOOD PRESSURE: 132 MMHG | BODY MASS INDEX: 28.55 KG/M2 | HEART RATE: 67 BPM

## 2025-01-07 DIAGNOSIS — R53.83 FATIGUE, UNSPECIFIED TYPE: Primary | ICD-10-CM

## 2025-01-07 DIAGNOSIS — E78.5 DM TYPE 2 WITH DIABETIC DYSLIPIDEMIA (MULTI): ICD-10-CM

## 2025-01-07 DIAGNOSIS — N18.32 STAGE 3B CHRONIC KIDNEY DISEASE (MULTI): ICD-10-CM

## 2025-01-07 DIAGNOSIS — I48.0 PAROXYSMAL ATRIAL FIBRILLATION (MULTI): ICD-10-CM

## 2025-01-07 DIAGNOSIS — E55.9 VITAMIN D DEFICIENCY: ICD-10-CM

## 2025-01-07 DIAGNOSIS — I28.8 OTHER DISEASES OF PULMONARY VESSELS: ICD-10-CM

## 2025-01-07 DIAGNOSIS — E11.9 DIABETES MELLITUS TYPE 2 WITHOUT RETINOPATHY (MULTI): ICD-10-CM

## 2025-01-07 DIAGNOSIS — R25.1 TREMOR: ICD-10-CM

## 2025-01-07 DIAGNOSIS — E11.69 DM TYPE 2 WITH DIABETIC DYSLIPIDEMIA (MULTI): ICD-10-CM

## 2025-01-07 DIAGNOSIS — G83.10 PARESIS OF SINGLE LOWER EXTREMITY (MULTI): ICD-10-CM

## 2025-01-07 DIAGNOSIS — Z86.718 HISTORY OF DVT (DEEP VEIN THROMBOSIS): ICD-10-CM

## 2025-01-07 DIAGNOSIS — J45.901 MODERATE ASTHMA WITH ACUTE EXACERBATION, UNSPECIFIED WHETHER PERSISTENT (HHS-HCC): ICD-10-CM

## 2025-01-07 DIAGNOSIS — E78.2 MIXED HYPERLIPIDEMIA: ICD-10-CM

## 2025-01-07 DIAGNOSIS — I10 PRIMARY HYPERTENSION: ICD-10-CM

## 2025-01-07 PROCEDURE — G2211 COMPLEX E/M VISIT ADD ON: HCPCS | Performed by: FAMILY MEDICINE

## 2025-01-07 PROCEDURE — 99214 OFFICE O/P EST MOD 30 MIN: CPT | Performed by: FAMILY MEDICINE

## 2025-01-07 RX ORDER — PROPRANOLOL HYDROCHLORIDE 10 MG/1
10 TABLET ORAL 2 TIMES DAILY
Qty: 60 TABLET | Refills: 2 | Status: SHIPPED | OUTPATIENT
Start: 2025-01-07 | End: 2025-04-07

## 2025-01-07 RX ORDER — LANCETS
EACH MISCELLANEOUS
Qty: 100 EACH | Refills: 3 | Status: SHIPPED | OUTPATIENT
Start: 2025-01-07 | End: 2025-01-07 | Stop reason: WASHOUT

## 2025-01-07 RX ORDER — IBUPROFEN 200 MG
1 CAPSULE ORAL 3 TIMES DAILY
Qty: 100 EACH | Refills: 1 | Status: SHIPPED | OUTPATIENT
Start: 2025-01-07 | End: 2025-01-07 | Stop reason: WASHOUT

## 2025-01-07 RX ORDER — DEXTROSE 4 G
TABLET,CHEWABLE ORAL
Qty: 1 EACH | Refills: 0 | Status: SHIPPED | OUTPATIENT
Start: 2025-01-07 | End: 2025-01-07 | Stop reason: WASHOUT

## 2025-01-07 ASSESSMENT — ENCOUNTER SYMPTOMS
ABDOMINAL DISTENTION: 0
ABDOMINAL PAIN: 0
VOMITING: 0
RHINORRHEA: 0
SLEEP DISTURBANCE: 0
DIAPHORESIS: 0
POLYPHAGIA: 0
NERVOUS/ANXIOUS: 0
STRIDOR: 0
CONSTITUTIONAL NEGATIVE: 1
UNEXPECTED WEIGHT CHANGE: 0
HEADACHES: 0
CARDIOVASCULAR NEGATIVE: 1
LIGHT-HEADEDNESS: 0
ACTIVITY CHANGE: 0
CHILLS: 0
EYE PAIN: 0
BLOOD IN STOOL: 0
POLYDIPSIA: 0
COUGH: 0
NECK STIFFNESS: 0
HALLUCINATIONS: 0
ANAL BLEEDING: 0
APPETITE CHANGE: 0
SHORTNESS OF BREATH: 0
FLANK PAIN: 0
TROUBLE SWALLOWING: 0
DIARRHEA: 0
APNEA: 0
PHOTOPHOBIA: 0
HEMATURIA: 0
SPEECH DIFFICULTY: 0
DIZZINESS: 0
CONFUSION: 0
CHOKING: 0
CONSTIPATION: 0
EYE DISCHARGE: 0
COLOR CHANGE: 0
FATIGUE: 0
NUMBNESS: 0
NAUSEA: 0
SEIZURES: 0
MYALGIAS: 0
FACIAL SWELLING: 0
SORE THROAT: 0
ADENOPATHY: 0
JOINT SWELLING: 0
GASTROINTESTINAL NEGATIVE: 1
PALPITATIONS: 0
CHEST TIGHTNESS: 0
EYE REDNESS: 0
SINUS PAIN: 0
VOICE CHANGE: 0
DYSURIA: 0
WHEEZING: 0
NECK PAIN: 0
ARTHRALGIAS: 0
WEAKNESS: 0
HYPERACTIVE: 0
EYE ITCHING: 0
BRUISES/BLEEDS EASILY: 0
BACK PAIN: 0
SINUS PRESSURE: 0
WOUND: 0
DIFFICULTY URINATING: 0
FACIAL ASYMMETRY: 0
DYSPHORIC MOOD: 0
TREMORS: 1
AGITATION: 0
RECTAL PAIN: 0
FREQUENCY: 0
FEVER: 0
DECREASED CONCENTRATION: 0

## 2025-01-07 ASSESSMENT — PATIENT HEALTH QUESTIONNAIRE - PHQ9
2. FEELING DOWN, DEPRESSED OR HOPELESS: NOT AT ALL
1. LITTLE INTEREST OR PLEASURE IN DOING THINGS: NOT AT ALL
SUM OF ALL RESPONSES TO PHQ9 QUESTIONS 1 AND 2: 0

## 2025-01-07 NOTE — PROGRESS NOTES
Subjective   Patient ID: Francis Vang is a 79 y.o. male who presents for Diabetes and Shaking.    HPI     Patient presents in the office today to follow up on labs that he had completed on 1/6/25. Patient states his blood glucose has been running good. Patient reports that his hand is still shaking. Patient states Dr. Jordan started him on Metoprolol XL and he states symptoms are still present. He reports that this started after he was taken off of Sotalol.    Patient reports this mornings glucose was 105.    Review of Systems   Constitutional: Negative.  Negative for activity change, appetite change, chills, diaphoresis, fatigue, fever and unexpected weight change.   HENT: Negative.  Negative for congestion, dental problem, ear discharge, facial swelling, hearing loss, mouth sores, nosebleeds, postnasal drip, rhinorrhea, sinus pressure, sinus pain, sneezing, sore throat, tinnitus, trouble swallowing and voice change.    Eyes:  Negative for photophobia, pain, discharge, redness, itching and visual disturbance.   Respiratory:  Negative for apnea, cough, choking, chest tightness, shortness of breath, wheezing and stridor.    Cardiovascular: Negative.  Negative for chest pain, palpitations and leg swelling.   Gastrointestinal: Negative.  Negative for abdominal distention, abdominal pain, anal bleeding, blood in stool, constipation, diarrhea, nausea, rectal pain and vomiting.   Endocrine: Negative for cold intolerance, heat intolerance, polydipsia, polyphagia and polyuria.   Genitourinary:  Negative for decreased urine volume, difficulty urinating, dysuria, enuresis, flank pain, frequency, hematuria and urgency.   Musculoskeletal:  Negative for arthralgias, back pain, gait problem, joint swelling, myalgias, neck pain and neck stiffness.   Skin:  Negative for color change, pallor, rash and wound.   Allergic/Immunologic: Negative for environmental allergies, food allergies and immunocompromised state.   Neurological:   Positive for tremors. Negative for dizziness, seizures, syncope, facial asymmetry, speech difficulty, weakness, light-headedness, numbness and headaches.   Hematological:  Negative for adenopathy. Does not bruise/bleed easily.   Psychiatric/Behavioral:  Negative for agitation, behavioral problems, confusion, decreased concentration, dysphoric mood, hallucinations, self-injury, sleep disturbance and suicidal ideas. The patient is not nervous/anxious and is not hyperactive.    All other systems reviewed and are negative.    Objective   /74 (BP Location: Left arm, Patient Position: Sitting)   Pulse 67   Temp 36.1 °C (97 °F) (Temporal)   Ht 1.829 m (6')   Wt 95.6 kg (210 lb 12.8 oz)   SpO2 95%   BMI 28.59 kg/m²     Physical Exam  Vitals reviewed.   Constitutional:       General: He is not in acute distress.     Appearance: Normal appearance. He is normal weight. He is not ill-appearing or diaphoretic.   HENT:      Head: Normocephalic.      Right Ear: Tympanic membrane and external ear normal.      Left Ear: Tympanic membrane and external ear normal.      Nose: Nose normal. No congestion.      Mouth/Throat:      Pharynx: No posterior oropharyngeal erythema.   Eyes:      General:         Right eye: No discharge.         Left eye: No discharge.      Extraocular Movements: Extraocular movements intact.      Conjunctiva/sclera: Conjunctivae normal.      Pupils: Pupils are equal, round, and reactive to light.   Cardiovascular:      Rate and Rhythm: Normal rate and regular rhythm.      Pulses: Normal pulses.      Heart sounds: Normal heart sounds. No murmur heard.  Pulmonary:      Effort: Pulmonary effort is normal. No respiratory distress.      Breath sounds: Normal breath sounds. No wheezing or rales.   Chest:      Chest wall: No tenderness.   Abdominal:      General: Abdomen is flat. Bowel sounds are normal. There is no distension.      Palpations: There is no mass.      Tenderness: There is no abdominal  tenderness. There is no guarding.   Musculoskeletal:         General: No tenderness. Normal range of motion.      Cervical back: Normal range of motion and neck supple. No tenderness.      Right lower leg: No edema.      Left lower leg: No edema.   Skin:     General: Skin is dry.      Coloration: Skin is not jaundiced.      Findings: No bruising, erythema or rash.   Neurological:      General: No focal deficit present.      Mental Status: He is alert and oriented to person, place, and time. Mental status is at baseline.      Cranial Nerves: No cranial nerve deficit.      Sensory: No sensory deficit.      Coordination: Coordination normal.      Gait: Gait normal.   Psychiatric:         Mood and Affect: Mood normal.         Thought Content: Thought content normal.         Judgment: Judgment normal.         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    Relevant Medications    blood-glucose meter misc    lancets misc    blood sugar diagnostic (Blood Glucose Test) strip    Other Relevant Orders    Follow Up In [facility] - PCP - Established    Hemoglobin A1C    Fatigue - Primary R53.83    Relevant Orders    CBC and Auto Differential    History of DVT (deep vein thrombosis) Z86.718    Relevant Orders    Follow Up In [facility] - PCP - Established    Hyperlipidemia E78.5    Relevant Orders    Follow Up [facility] - PCP - Established    Comprehensive Metabolic Panel    Lipid Panel    Vitamin D deficiency E55.9    Relevant Orders    Follow Up In [facility] - PCP - Established    Vitamin D 25-Hydroxy,Total (for eval of Vitamin D levels)    Diabetes mellitus type 2 without retinopathy (Multi) E11.9    Relevant Orders    Follow Up [facility] - PCP - Established    Primary  hypertension I10    Relevant Orders    Follow Up [facility] - PCP - Established    Tremor R25.1    Relevant Medications    propranolol (Inderal) 10 mg tablet    Other Relevant Orders    Follow Up In [facility] - PCP - Established   Scribe Attestation  By signing my name below, I, David Nguyễn MAe   attest that this documentation has been prepared under the direction and in the presence of Jose Manuel Estrada MD.    Provider Attestation - Scribe documentation    All medical record entries made by the Scribe were at my direction and personally dictated by me. I have reviewed the chart and agree that the record accurately reflects my personal performance of the history, physical exam, discussion and plan.    Continue current medications and therapy for chronic medical conditions       pt also stopped sotalol due to bradycardia per cardiologist   STOP METOPROLOL INEFFECTIVE BEGIN PROPRANOLOL 10 MG bid for tremor    Lesion removal ( 2) via cryo treatment nose & arm         Check next     Patient does not feel breztri helped symptoms   patient is taking Trelegy but pt stopped     Consider CT A & P symptoms resolved     Urine & bowel incont     Improved with vesicare & proscar and surgery     Could not tolerate metformin       PSA q 6 months        Nuclear stress wnl 10/2022      ozempic  0.5 mg weekly  given  Pharmacy referral placed     surgery on back scheduled 1/31/24 with ..   restarted pregabalin 75 mg BID      Exercise at home on legs at shower 5-10 min  every day.     ECHO done 7/24/24

## 2025-01-10 ENCOUNTER — APPOINTMENT (OUTPATIENT)
Dept: PHARMACY | Facility: HOSPITAL | Age: 80
End: 2025-01-10
Payer: MEDICARE

## 2025-01-10 DIAGNOSIS — E11.69 DM TYPE 2 WITH DIABETIC DYSLIPIDEMIA (MULTI): ICD-10-CM

## 2025-01-10 DIAGNOSIS — E78.5 DM TYPE 2 WITH DIABETIC DYSLIPIDEMIA (MULTI): ICD-10-CM

## 2025-01-10 RX ORDER — SEMAGLUTIDE 0.68 MG/ML
0.5 INJECTION, SOLUTION SUBCUTANEOUS
Qty: 3 ML | Refills: 0 | Status: SHIPPED | OUTPATIENT
Start: 2025-01-12

## 2025-01-14 ENCOUNTER — TELEMEDICINE (OUTPATIENT)
Dept: PHARMACY | Facility: HOSPITAL | Age: 80
End: 2025-01-14
Payer: MEDICARE

## 2025-01-14 DIAGNOSIS — E78.5 DM TYPE 2 WITH DIABETIC DYSLIPIDEMIA (MULTI): ICD-10-CM

## 2025-01-14 DIAGNOSIS — E11.69 DM TYPE 2 WITH DIABETIC DYSLIPIDEMIA (MULTI): ICD-10-CM

## 2025-01-14 NOTE — PROGRESS NOTES
Clinical Pharmacy Appointment    Patient ID: Francis Vang is a 79 y.o. male who presents for Diabetes.    Pt is here for Follow Up appointment.     Referring Provider: Jose Manuel Estrada MD  PCP: Jose Manuel Estrada MD   Last visit with PCP: 1/7/2025   Next visit with PCP: 4/8/2025      Subjective     HPI    DIABETES MELLITUS TYPE 2:    Diagnosed with diabetes: 20-25+ years ago. Known diabetic complications: none.  Does patient follow with Endocrinology: No  Last optometry exam: sees every year, last appt last month  Most recent visit in Podiatry: n/a-- patient denies sores or cuts on feet today      Current diabetic medications include:  Jardiance 25mg every day  Ozempic 0.5mg subcutaneous weekly (Friday)  Pioglitazone 45mg every day      Past diabetic medications include:  Metformin 500mg ER (could not tolerate)    Clarifications to above regimen: none  Adverse Effects: hand tremor when writing (started after Ozempic but Ozempic was discontinued for a few weeks and hand tremor persisted, likely not related); acid reflux    Glucose Readings:  Glucometer/CGM Type: Test strips-OneTouch  Patient tests BG 1 times per day in the morning    Current home BG readings: 90-115mg/dL  Previous home BG readings: 90-120mg/dL    Any episodes of hypoglycemia? No, lowest was 90mg/dL .  Did patient treat episode of hypoglycemia appropriately? N/A  Does the patient have a prescription for ready-to-use Glucagon? No not on insulin  Does pt have proteinuria? No recent urine culture    Lifestyle:  Diet: 2 meals/day. Late breakfast and late lunch  BK: cereal with milk - cheerios, rice krispies, rye bread; eggs, toast, sausage, potatoes  LN: salads, potatoes, green beans, chicken, spaghetti    Drinks: coffee in the morning, tea, water, pop (occasionally during the summer)  Physical Activity: fixes stuff around the house; yard work    Secondary Prevention:  Statin? Yes- Atorvastatin 10mg- every day  ACE-I/ARB? Yes- Lisinopril 5mg -  every day   Aspirin? No    Pertinent PMH Review:  PMH of Pancreatitis: No  PMH of Retinopathy: No  PMH of Urinary Tract Infections: No  PMH of MTC: No    Drug Interactions  No relevant drug interactions were noted.    Medication System Management  Patient's preferred pharmacy: [facility]  Adherence/Organization: no issues reported  Affordability/Accessibility: no issues reported      Objective   Allergies   Allergen Reactions    Ragweed Pollen Other     Summer seasonal allergies     Social History     Social History Narrative    Not on file      Medication Review  Current Outpatient Medications   Medication Instructions    allopurinol (Zyloprim) 300 mg tablet TAKE 1 TABLET BY MOUTH EVERY DAY    atorvastatin (LIPITOR) 10 mg, oral, Daily    blood sugar diagnostic strip 1 strip, miscellaneous, 2 times daily    blood-glucose meter misc Needed for Glucose reading    cyanocobalamin (VITAMIN B-12) 1,000 mcg, oral, Daily    finasteride (PROSCAR) 5 mg, oral, Daily, Do not crush, chew, or split.     hydroCHLOROthiazide (HYDRODIURIL) 25 mg, oral, Daily    Jardiance 25 mg, oral, Daily    lisinopril 5 mg, oral, Daily    metoprolol succinate XL (TOPROL XL) 25 mg, oral, Daily, Do not crush or chew.    montelukast (Singulair) 10 mg tablet TAKE 1 TABLET BY MOUTH EVERYDAY AT BEDTIME    NIFEdipine ER (ADALAT CC) 60 mg, oral, Daily    nitroglycerin (NITROSTAT) 0.4 mg, Every 5 min PRN    OneTouch Delica Plus Lancet 33 gauge misc 2 EACH 2 TIMES A DAY.    Ozempic 0.5 mg, subcutaneous, Once Weekly    pioglitazone (Actos) 45 mg tablet TAKE 1 TABLET BY MOUTH EVERY DAY    propranolol (INDERAL) 10 mg, oral, 2 times daily    solifenacin (VESICARE) 10 mg, oral, Daily, Swallow tablet whole; do not crush, chew, or split.    Xarelto 20 mg tablet TAKE 1 TABLET BY MOUTH EVERY DAY      Vitals  BP Readings from Last 2 Encounters:   01/07/25 132/74   12/20/24 139/68     BMI Readings from Last 1 Encounters:   01/07/25 28.59 kg/m²      Labs  A1C  Lab  "Results   Component Value Date    HGBA1C 6.3 (H) 01/06/2025    HGBA1C 7.4 (H) 10/02/2024    HGBA1C 7.6 (H) 07/02/2024     BMP  Lab Results   Component Value Date    CALCIUM 9.7 01/06/2025     01/06/2025    K 4.0 01/06/2025    CO2 28 01/06/2025     01/06/2025    BUN 38 (H) 01/06/2025    CREATININE 1.87 (H) 01/06/2025    EGFR 36 (L) 01/06/2025     LFTs  Lab Results   Component Value Date    ALT 8 (L) 01/06/2025    AST 11 01/06/2025    ALKPHOS 83 01/06/2025    BILITOT 0.5 01/06/2025     FLP  Lab Results   Component Value Date    TRIG 81 01/06/2025    CHOL 99 01/06/2025    LDLF 47 08/24/2023    LDLCALC 50 01/06/2025    HDL 32.8 01/06/2025     Urine Microalbumin  No results found for: \"MICROALBCREA\"    Weight Management  Wt Readings from Last 3 Encounters:   01/07/25 95.6 kg (210 lb 12.8 oz)   12/20/24 97.1 kg (214 lb)   11/04/24 98.3 kg (216 lb 12.8 oz)      There is no height or weight on file to calculate BMI.     Assessment/Plan   Problem List Items Addressed This Visit       DM type 2 with diabetic dyslipidemia (Multi)     Patient's goal A1c is < 7%.  Is pt at goal? No, 6.3% (1/6/2025)  Patient's SMBGs are within normal range. Congratulated patient on decrease in A1c!  Rationale for plan: Continue current medications, patient tolerating Ozempic well. Will continue current dose as patient is tolerating well and sugars are well controlled.    Medication Changes:  CONTINUE:  Pioglitazone 45mg every day    Jardiance 25mg every day  Ozempic 0.5mg subcutaneous weekly. Will send refill to patients preferred [facility].    Future Considerations:  Consider further dietary and lifestyle modifications  Check to see if tremors are still present  Increasing dose of Ozempic to 1mg at next visit    Diabetes Education  Rule of 15: eating ~15 g of carbs when BG less than 80 (half cup juice, 3-4 glucose tabs).  Recognize symptoms of high and low blood sugar.   Eat a realistic healthy diet consisting of fruits, " vegetables, fiber, protein food choices on a regular basis and be aware of portion/serving sizes. Reduce carbohydrate consumption and always consume with protein and fat. Avoid foods high in saturated/trans fat, high salt content, and sweets and beverages with added sugars.  Limit alcohol consumption; alcohol may affect your blood sugar and cause hypoglycemia.   Stay active and incorporate ~30 mins of exercise into your daily routine to manage your weight and increase the body's acceptance of insulin.    PATIENT GOALS   Fasting B - 130 mg/dL 2-HR Postprandial BG:   Less than 180 mg/dL A1c:   Less than 7.0 %     Ozempic Education:  Counseled patient on Ozempic MOA, expectations, side effects, duration of therapy, administration, and monitoring parameters.  Counseled patient on the benefits of GLP-1ra, such as cardiovascular risk reduction, glycemic control, and weight loss potential.  Provided detailed dosing and administration counseling to ensure proper technique.   Reviewed Ozempic titration schedule, starting with 0.25 mg once weekly to 0.5 mg, 1 mg, and if tolerated 2 mg.  Reviewed storage requirements of Ozempic when not in use, and when to administer the medication if a dose is missed.  Discussed risks of GLP1ra including risk of pancreatitis, MTC and worsening of DR  Advised patient that they may experience improved satiety after meals and portion sizes of meals may be reduced as doses of Ozempic increase.     Empagliflozin (Jardiance) Education:  Counseled patient on Empagliflozin (Jardiance) MOA, expectations, side effects, duration of therapy, administration, and monitoring parameters.  Reviewed the benefits of SGLT-2i therapy, such as glycemic control and kidney and CV protection.  Advised patient to practice proper  hygiene to reduce risk of UTIs or yeast infections.  Advised patient to maintain adequate fluid intake to remain hydrated while on SGLT2i therapy.  Answered all patient questions  and concerns.          Clinical Pharmacist follow-up: as needed by patient or PCP, Telehealth visit. Patient is now well controlled and will follow with the PCP and reach out to the [facility] if anything more is needed.    Continue all meds under the continuation of care with the referring provider and clinical pharmacy team.    Refill for Lisinopril has been requested and order is pended to Dr. Estrada.    Thank you,  Kimberly Koch, PharmD  Clinical Pharmacist  [phone]    Verbal consent to manage patient's drug therapy was obtained from the patient. They were informed they may decline to participate or withdraw from participation in pharmacy services at any time.

## 2025-01-14 NOTE — ASSESSMENT & PLAN NOTE
Patient's goal A1c is < 7%.  Is pt at goal? No, 6.3% (2025)  Patient's SMBGs are within normal range. Congratulated patient on decrease in A1c!  Rationale for plan: Continue current medications, patient tolerating Ozempic well. Will continue current dose as patient is tolerating well and sugars are well controlled.    Medication Changes:  CONTINUE:  Pioglitazone 45mg every day    Jardiance 25mg every day  Ozempic 0.5mg subcutaneous weekly. Will send refill to patients preferred [facility].    Future Considerations:  Consider further dietary and lifestyle modifications  Check to see if tremors are still present  Increasing dose of Ozempic to 1mg at next visit    Diabetes Education  Rule of 15: eating ~15 g of carbs when BG less than 80 (half cup juice, 3-4 glucose tabs).  Recognize symptoms of high and low blood sugar.   Eat a realistic healthy diet consisting of fruits, vegetables, fiber, protein food choices on a regular basis and be aware of portion/serving sizes. Reduce carbohydrate consumption and always consume with protein and fat. Avoid foods high in saturated/trans fat, high salt content, and sweets and beverages with added sugars.  Limit alcohol consumption; alcohol may affect your blood sugar and cause hypoglycemia.   Stay active and incorporate ~30 mins of exercise into your daily routine to manage your weight and increase the body's acceptance of insulin.    PATIENT GOALS   Fasting B - 130 mg/dL 2-HR Postprandial BG:   Less than 180 mg/dL A1c:   Less than 7.0 %     Ozempic Education:  Counseled patient on Ozempic MOA, expectations, side effects, duration of therapy, administration, and monitoring parameters.  Counseled patient on the benefits of GLP-1ra, such as cardiovascular risk reduction, glycemic control, and weight loss potential.  Provided detailed dosing and administration counseling to ensure proper technique.   Reviewed Ozempic titration schedule, starting with 0.25 mg once weekly  to 0.5 mg, 1 mg, and if tolerated 2 mg.  Reviewed storage requirements of Ozempic when not in use, and when to administer the medication if a dose is missed.  Discussed risks of GLP1ra including risk of pancreatitis, MTC and worsening of DR  Advised patient that they may experience improved satiety after meals and portion sizes of meals may be reduced as doses of Ozempic increase.     Empagliflozin (Jardiance) Education:  Counseled patient on Empagliflozin (Jardiance) MOA, expectations, side effects, duration of therapy, administration, and monitoring parameters.  Reviewed the benefits of SGLT-2i therapy, such as glycemic control and kidney and CV protection.  Advised patient to practice proper  hygiene to reduce risk of UTIs or yeast infections.  Advised patient to maintain adequate fluid intake to remain hydrated while on SGLT2i therapy.  Answered all patient questions and concerns.

## 2025-01-20 DIAGNOSIS — N40.1 BENIGN PROSTATIC HYPERPLASIA WITH INCOMPLETE BLADDER EMPTYING: ICD-10-CM

## 2025-01-20 DIAGNOSIS — R39.14 BENIGN PROSTATIC HYPERPLASIA WITH INCOMPLETE BLADDER EMPTYING: ICD-10-CM

## 2025-01-20 RX ORDER — SOLIFENACIN SUCCINATE 10 MG/1
10 TABLET, FILM COATED ORAL DAILY
Qty: 90 TABLET | Refills: 0 | Status: SHIPPED | OUTPATIENT
Start: 2025-01-20 | End: 2025-04-20

## 2025-01-25 DIAGNOSIS — I10 ESSENTIAL (PRIMARY) HYPERTENSION: ICD-10-CM

## 2025-01-27 NOTE — TELEPHONE ENCOUNTER
Recent Visits  Date Type Provider Dept   01/07/25 Office Visit Jose Manuel Estrada MD [facility]   11/04/24 Office Visit Jose Manuel Estrada MD [facility]   10/03/24 Office Visit Jose Manuel Estrada MD [facility]   Showing recent visits within past 180 days and meeting all other requirements  Future Appointments  Date Type Provider Dept   04/08/25 Appointment Jose Manuel Estrada MD [facility]   Showing future appointments within next 90 days and meeting all other requirements

## 2025-01-28 RX ORDER — HYDROCHLOROTHIAZIDE 25 MG/1
25 TABLET ORAL DAILY
Qty: 90 TABLET | Refills: 1 | Status: SHIPPED | OUTPATIENT
Start: 2025-01-28

## 2025-02-01 DIAGNOSIS — R25.1 TREMOR: ICD-10-CM

## 2025-02-01 RX ORDER — PROPRANOLOL HYDROCHLORIDE 10 MG/1
10 TABLET ORAL 2 TIMES DAILY
Qty: 180 TABLET | Refills: 1 | Status: SHIPPED | OUTPATIENT
Start: 2025-02-01

## 2025-03-13 DIAGNOSIS — E11.69 TYPE 2 DIABETES MELLITUS WITH OTHER SPECIFIED COMPLICATION: ICD-10-CM

## 2025-03-13 RX ORDER — PIOGLITAZONEHYDROCHLORIDE 45 MG/1
TABLET ORAL
Qty: 90 TABLET | Refills: 1 | Status: SHIPPED | OUTPATIENT
Start: 2025-03-13

## 2025-03-13 NOTE — TELEPHONE ENCOUNTER
Recent Visits  Date Type Provider Dept   01/07/25 Office Visit Jose Manuel Estrada MD [facility]   11/04/24 Office Visit Jose Manuel Estrada MD [facility]   10/03/24 Office Visit Jose Manuel Estrada MD [facility]   07/03/24 Office Visit Jose Manuel Estrada MD [facility]   04/03/24 Office Visit Jose Manuel Estrada MD [facility]   Showing recent visits within past 365 days and meeting all other requirements  Future Appointments  Date Type Provider Dept   04/08/25 Appointment Jose Manuel Estrada MD [facility]   Showing future appointments within next 90 days and meeting all other requirements

## 2025-03-15 DIAGNOSIS — E78.5 DM TYPE 2 WITH DIABETIC DYSLIPIDEMIA (MULTI): ICD-10-CM

## 2025-03-15 DIAGNOSIS — E11.69 DM TYPE 2 WITH DIABETIC DYSLIPIDEMIA (MULTI): ICD-10-CM

## 2025-03-16 DIAGNOSIS — E78.5 HYPERLIPIDEMIA, UNSPECIFIED: ICD-10-CM

## 2025-03-16 DIAGNOSIS — E11.69 TYPE 2 DIABETES MELLITUS WITH OTHER SPECIFIED COMPLICATION: ICD-10-CM

## 2025-03-17 RX ORDER — EMPAGLIFLOZIN 25 MG/1
25 TABLET, FILM COATED ORAL DAILY
Qty: 90 TABLET | Refills: 1 | Status: SHIPPED | OUTPATIENT
Start: 2025-03-17

## 2025-03-19 RX ORDER — SEMAGLUTIDE 0.68 MG/ML
0.5 INJECTION, SOLUTION SUBCUTANEOUS
Qty: 3 ML | Refills: 2 | Status: SHIPPED | OUTPATIENT
Start: 2025-03-23

## 2025-04-08 ENCOUNTER — APPOINTMENT (OUTPATIENT)
Dept: PRIMARY CARE | Facility: CLINIC | Age: 80
End: 2025-04-08
Payer: MEDICARE

## 2025-04-08 VITALS
BODY MASS INDEX: 28.15 KG/M2 | WEIGHT: 207.8 LBS | TEMPERATURE: 97.2 F | HEIGHT: 72 IN | DIASTOLIC BLOOD PRESSURE: 58 MMHG | OXYGEN SATURATION: 94 % | HEART RATE: 47 BPM | RESPIRATION RATE: 16 BRPM | SYSTOLIC BLOOD PRESSURE: 108 MMHG

## 2025-04-08 DIAGNOSIS — I28.8 OTHER DISEASES OF PULMONARY VESSELS: ICD-10-CM

## 2025-04-08 DIAGNOSIS — G83.10 PARESIS OF SINGLE LOWER EXTREMITY: ICD-10-CM

## 2025-04-08 DIAGNOSIS — E11.69 DM TYPE 2 WITH DIABETIC DYSLIPIDEMIA (MULTI): ICD-10-CM

## 2025-04-08 DIAGNOSIS — I48.0 PAROXYSMAL ATRIAL FIBRILLATION (MULTI): ICD-10-CM

## 2025-04-08 DIAGNOSIS — Z86.718 HISTORY OF DVT (DEEP VEIN THROMBOSIS): ICD-10-CM

## 2025-04-08 DIAGNOSIS — R25.1 TREMOR: ICD-10-CM

## 2025-04-08 DIAGNOSIS — E78.2 MIXED HYPERLIPIDEMIA: ICD-10-CM

## 2025-04-08 DIAGNOSIS — E78.5 DM TYPE 2 WITH DIABETIC DYSLIPIDEMIA (MULTI): ICD-10-CM

## 2025-04-08 DIAGNOSIS — I10 PRIMARY HYPERTENSION: ICD-10-CM

## 2025-04-08 DIAGNOSIS — N18.32 STAGE 3B CHRONIC KIDNEY DISEASE (MULTI): ICD-10-CM

## 2025-04-08 DIAGNOSIS — Z12.5 SCREENING FOR PROSTATE CANCER: Primary | ICD-10-CM

## 2025-04-08 DIAGNOSIS — E11.9 DIABETES MELLITUS TYPE 2 WITHOUT RETINOPATHY (MULTI): ICD-10-CM

## 2025-04-08 DIAGNOSIS — R53.83 FATIGUE, UNSPECIFIED TYPE: ICD-10-CM

## 2025-04-08 DIAGNOSIS — J45.901 MODERATE ASTHMA WITH ACUTE EXACERBATION, UNSPECIFIED WHETHER PERSISTENT (HHS-HCC): ICD-10-CM

## 2025-04-08 DIAGNOSIS — E55.9 VITAMIN D DEFICIENCY: ICD-10-CM

## 2025-04-08 LAB
25(OH)D3+25(OH)D2 SERPL-MCNC: 38 NG/ML (ref 30–100)
ALBUMIN SERPL-MCNC: 3.8 G/DL (ref 3.6–5.1)
ALP SERPL-CCNC: 71 U/L (ref 35–144)
ALT SERPL-CCNC: 8 U/L (ref 9–46)
ANION GAP SERPL CALCULATED.4IONS-SCNC: 9 MMOL/L (CALC) (ref 7–17)
AST SERPL-CCNC: 12 U/L (ref 10–35)
BASOPHILS # BLD AUTO: 59 CELLS/UL (ref 0–200)
BASOPHILS NFR BLD AUTO: 1.1 %
BILIRUB SERPL-MCNC: 0.7 MG/DL (ref 0.2–1.2)
BUN SERPL-MCNC: 32 MG/DL (ref 7–25)
CALCIUM SERPL-MCNC: 9.3 MG/DL (ref 8.6–10.3)
CHLORIDE SERPL-SCNC: 104 MMOL/L (ref 98–110)
CHOLEST SERPL-MCNC: 94 MG/DL
CHOLEST/HDLC SERPL: 2.9 (CALC)
CO2 SERPL-SCNC: 28 MMOL/L (ref 20–32)
CREAT SERPL-MCNC: 1.68 MG/DL (ref 0.7–1.28)
EGFRCR SERPLBLD CKD-EPI 2021: 41 ML/MIN/1.73M2
EOSINOPHIL # BLD AUTO: 292 CELLS/UL (ref 15–500)
EOSINOPHIL NFR BLD AUTO: 5.4 %
ERYTHROCYTE [DISTWIDTH] IN BLOOD BY AUTOMATED COUNT: 14.3 % (ref 11–15)
EST. AVERAGE GLUCOSE BLD GHB EST-MCNC: 151 MG/DL
EST. AVERAGE GLUCOSE BLD GHB EST-SCNC: 8.4 MMOL/L
GLUCOSE SERPL-MCNC: 100 MG/DL (ref 65–99)
HBA1C MFR BLD: 6.9 % OF TOTAL HGB
HCT VFR BLD AUTO: 36.4 % (ref 38.5–50)
HDLC SERPL-MCNC: 32 MG/DL
HGB BLD-MCNC: 12.3 G/DL (ref 13.2–17.1)
LDLC SERPL CALC-MCNC: 47 MG/DL (CALC)
LYMPHOCYTES # BLD AUTO: 1793 CELLS/UL (ref 850–3900)
LYMPHOCYTES NFR BLD AUTO: 33.2 %
MCH RBC QN AUTO: 32.5 PG (ref 27–33)
MCHC RBC AUTO-ENTMCNC: 33.8 G/DL (ref 32–36)
MCV RBC AUTO: 96 FL (ref 80–100)
MONOCYTES # BLD AUTO: 983 CELLS/UL (ref 200–950)
MONOCYTES NFR BLD AUTO: 18.2 %
NEUTROPHILS # BLD AUTO: 2273 CELLS/UL (ref 1500–7800)
NEUTROPHILS NFR BLD AUTO: 42.1 %
NONHDLC SERPL-MCNC: 62 MG/DL (CALC)
PLATELET # BLD AUTO: 224 THOUSAND/UL (ref 140–400)
PMV BLD REES-ECKER: 12.2 FL (ref 7.5–12.5)
POTASSIUM SERPL-SCNC: 4.2 MMOL/L (ref 3.5–5.3)
PROT SERPL-MCNC: 6.5 G/DL (ref 6.1–8.1)
RBC # BLD AUTO: 3.79 MILLION/UL (ref 4.2–5.8)
SODIUM SERPL-SCNC: 141 MMOL/L (ref 135–146)
TRIGL SERPL-MCNC: 70 MG/DL
WBC # BLD AUTO: 5.4 THOUSAND/UL (ref 3.8–10.8)

## 2025-04-08 PROCEDURE — 99214 OFFICE O/P EST MOD 30 MIN: CPT | Performed by: FAMILY MEDICINE

## 2025-04-08 PROCEDURE — G2211 COMPLEX E/M VISIT ADD ON: HCPCS | Performed by: FAMILY MEDICINE

## 2025-04-08 RX ORDER — FLUTICASONE FUROATE, UMECLIDINIUM BROMIDE AND VILANTEROL TRIFENATATE 200; 62.5; 25 UG/1; UG/1; UG/1
1 POWDER RESPIRATORY (INHALATION)
Qty: 3 EACH | Refills: 1 | Status: SHIPPED | OUTPATIENT
Start: 2025-04-08

## 2025-04-08 ASSESSMENT — PATIENT HEALTH QUESTIONNAIRE - PHQ9
2. FEELING DOWN, DEPRESSED OR HOPELESS: NOT AT ALL
SUM OF ALL RESPONSES TO PHQ9 QUESTIONS 1 AND 2: 0
1. LITTLE INTEREST OR PLEASURE IN DOING THINGS: NOT AT ALL

## 2025-04-08 ASSESSMENT — ENCOUNTER SYMPTOMS
OCCASIONAL FEELINGS OF UNSTEADINESS: 0
LOSS OF SENSATION IN FEET: 0
DEPRESSION: 0

## 2025-04-08 NOTE — PROGRESS NOTES
Subjective   Patient ID: Francis Vang is a 79 y.o. male who presents for Results (BW performed on 04/07/2025.).  History of Present Illness  Francis Vang is a 79 year old male who presents for follow-up of chronic back pain and shortness of breath.    He has persistent back pain following a previous surgery where screws were placed. The pain is exacerbated by activities such as walking on a treadmill, particularly affecting the right side and impacting his mobility. The pain subsides after a couple of days. His bladder and bowel control have improved since the surgery.    He continues to experience shortness of breath, which has not improved with the use of inhalers. The symptom is variable, with some days being better than others. He has no history of smoking.    His diabetes management appears stable, with an A1c recently recorded at 6.9, slightly up from a previous 6.3. There have been no changes in his diabetes medications.    Recent lab results indicate a hemoglobin level of 12.3 and hematocrit of 36.4, showing slight improvement from previous values. His kidney function, as measured by GFR, has improved from 36 to 41. His cholesterol levels are low, with an LDL of 47. He is currently taking Jardiance, which is noted to help protect kidney function.    Review of Systems  12 Systems have been reviewed as follows.  Constitutional: Fever, weight gain, weight loss, appetite change, night sweats, fatigue, chills.  Eyes : blurry, double vision, vision, loss, tearing, redness, pain, sensitivity to light, glaucoma.  Ears, nose, mouth, and throat: Hearing loss, ringing in the ears, ear pain, nasal congestion, nasal drainage, nosebleeds, mouth, throat, irritation tooth problem.  Cardiovascular :chest pain, pressure, heart racing, palpitations, sweating, leg swelling, high or low blood pressure  Pulmonary: Cough, yellow or green sputum, blood and sputum, shortness of breath, wheezing  Gastrointestinal: Nausea,  vomiting, diarrhea, constipation, pain, blood in stool, or vomitus, heartburn, difficulty swallowing  Genitourinary: incontinence, abnormal bleeding, abnormal discharge, urinary frequency, urinary hesitancy, pain, impotence sexual problem, infection, urinary retention  Musculoskeletal: Pain, stiffness, joint, redness or warmth, arthritis, back pain, weakness, muscle wasting, sprain or fracture  Neuro: Weight weakness, dizziness, change in voice, change in taste change in vision, change in hearing, loss, or change of sensation, trouble walking, balance problems coordination problems, shaking, speech problem  Endocrine , cold or heat intolerance, blood sugar problem, weight gain or loss missed periods hot flashes, sweats, change in body hair, change in libido, increased thirst, increased urination  Heme/lymph: Swelling, bleeding, problem anemia, bruising, enlarged lymph nodes  Allergic/immunologic: H. plus nasal drip, watery itchy eyes, nasal drainage, immunosuppressed  The above were reviewed and noted negative except as noted in HPI and Problem List.    Objective     /58 (BP Location: Right arm, Patient Position: Sitting, BP Cuff Size: Large adult)   Pulse (!) 47   Temp 36.2 °C (97.2 °F) (Temporal)   Resp 16   Ht 1.829 m (6')   Wt 94.3 kg (207 lb 12.8 oz)   SpO2 94%   BMI 28.18 kg/m²      Physical Exam    Constitutional: Well developed, well nourished, alert and in no acute distress   Eyes: Normal external exam. Pupils equally round and reactive to light with normal accommodation and extraocular movements intact.  Neck: Supple, no lymphadenopathy or masses.   Cardiovascular: Regular rate and rhythm, normal S1 and S2, no murmurs, gallops, or rubs. Radial pulses normal. No peripheral edema.  Pulmonary: No respiratory distress, lungs clear to auscultation bilaterally. No wheezes, rhonchi, rales.  Abdomen: soft,non tender, non distended, without masses or HSM  Skin: Warm, well perfused, normal skin turgor  and color.   Neurologic: Cranial nerves II-XII grossly intact.   Psychiatric: Mood calm and affect normal  Musculoskeletal: Moving all extremities without restriction  The above were reviewed and noted negative except as noted in HPI and Problem List.      Results  LABS  A1c: 7.4 (10/2024)  Vitamin D: 38  Hb: 11.8  Hct: 36.7  Glucose: 100  GFR: 36  Cholesterol: 94  LDL: 47         Assessment & Plan  Asthma  Persistent shortness of breath with no improvement from current inhalers. Symptoms fluctuate. Active and engages in yard work. No smoking history. Suggests retrying Trelegy to manage symptoms. Insurance covers Trelegy after deductible met in January.  - Provide samples of Trelegy for trial use  - Instruct to take Trelegy daily  - Call in a refill for Trelegy to [facility] on [address]    Back pain post-surgery  Persistent back pain post-surgery with some improvement in walking. Pain after treadmill use affects walking but resolves after a few days. Upcoming appointment with Dr. Laureano to assess surgical screws.  - Coordinate with Gilda to schedule an appointment with Dr. 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 On Ozempic and Jardiance, which also aids in kidney protection. Cholesterol levels well-controlled with LDL at 47.  - Continue current diabetes medications  - Monitor A1c and cholesterol levels in three months  - Order urine test for protein to monitor kidney function    Chronic kidney disease  GFR improved from 36 to 41, indicating better kidney function. On Jardiance, which provides renal protection.  - Continue Jardiance  - Monitor kidney function with GFR in three months    Vitamin D deficiency  Vitamin D level is 38, within the normal range.  - Monitor vitamin D levels in three months    Anemia  Mild anemia with hemoglobin at 12.3 and hematocrit at 36.4, showing slight  improvement.  - Monitor CBC in three months    Follow-up  Prefers follow-up in three months to allow time for the appointment with Dr. Laureano and to assess the effectiveness of Trelegy.  - Schedule follow-up appointment in three months  - Order CBC, CMP, lipid panel, vitamin D, TSH, A1c, PSA, and urine test for protein in three months    Problem List Items Addressed This Visit       Asthma    Relevant Medications    fluticasone-umeclidin-vilanter (Trelegy Ellipta) 200-62.5-25 mcg blister with device    DM type 2 with diabetic dyslipidemia (Multi)    Relevant Orders    Albumin-Creatinine Ratio, Urine Random    Hemoglobin A1C    Fatigue    Relevant Orders    CBC and Auto Differential    Thyroid Stimulating Hormone    History of DVT (deep vein thrombosis)    Hyperlipidemia    Relevant Orders    Lipid Panel    Comprehensive Metabolic Panel    Vitamin D deficiency    Relevant Orders    Vitamin D 25-Hydroxy,Total (for eval of Vitamin D levels)    Diabetes mellitus type 2 without retinopathy (Multi)    Paresis of single lower extremity    Primary hypertension    Other diseases of pulmonary vessels    Stage 3b chronic kidney disease (Multi)    Tremor    Paroxysmal atrial fibrillation (Multi)     Other Visit Diagnoses       Screening for prostate cancer    -  Primary    Relevant Orders    Prostate Specific Antigen, Screen         Continue current medications and therapy for chronic medical conditions         pt also stopped sotalol due to bradycardia per cardiologist   STOP METOPROLOL INEFFECTIVE BEGIN PROPRANOLOL 10 MG bid for tremor     Lesion removal ( 2) via cryo treatment nose & arm          Check next     Patient does not feel breztri helped symptoms   patient is taking Trelegy but pt stopped     Consider CT A & P symptoms resolved     Urine & bowel incont     Improved with vesicare & proscar and surgery     Could not tolerate metformin       PSA q 6 months        Nuclear stress wnl 10/2022      ozempic  0.5 mg weekly   given  Pharmacy referral placed     surgery on back scheduled 1/31/24 with ..   restarted pregabalin 75 mg BID      Exercise at home on legs at shower 5-10 min  every day.     ECHO done 7/24/24    Jose Manuel Estrada MD       This medical note was created with the assistance of artificial intelligence (AI) for documentation purposes. The content has been reviewed and confirmed by the healthcare provider for accuracy and completeness. Patient consented to the use of audio recording and use of AI during their visit.

## 2025-04-18 DIAGNOSIS — N40.1 BENIGN PROSTATIC HYPERPLASIA WITH INCOMPLETE BLADDER EMPTYING: ICD-10-CM

## 2025-04-18 DIAGNOSIS — R39.14 BENIGN PROSTATIC HYPERPLASIA WITH INCOMPLETE BLADDER EMPTYING: ICD-10-CM

## 2025-04-18 NOTE — TELEPHONE ENCOUNTER
Recent Visits  Date Type Provider Dept   04/08/25 Office Visit Jose Manuel Estrada MD [facility]   01/07/25 Office Visit Jose Manuel Estrada MD [facility]   11/04/24 Office Visit Jose Manuel Estrada MD [facility]   Showing recent visits within past 180 days and meeting all other requirements  Future Appointments  Date Type Provider Dept   07/08/25 Appointment Jose Manuel Estrada MD [facility]   Showing future appointments within next 90 days and meeting all other requirements

## 2025-04-19 RX ORDER — SOLIFENACIN SUCCINATE 10 MG/1
10 TABLET, FILM COATED ORAL DAILY
Qty: 90 TABLET | Refills: 0 | Status: SHIPPED | OUTPATIENT
Start: 2025-04-19 | End: 2025-07-18

## 2025-04-23 DIAGNOSIS — M10.9 GOUT, UNSPECIFIED: ICD-10-CM

## 2025-04-23 DIAGNOSIS — E78.5 HYPERLIPIDEMIA, UNSPECIFIED: ICD-10-CM

## 2025-04-23 DIAGNOSIS — J30.9 ALLERGIC RHINITIS, UNSPECIFIED: ICD-10-CM

## 2025-04-24 RX ORDER — ALLOPURINOL 300 MG/1
300 TABLET ORAL DAILY
Qty: 90 TABLET | Refills: 1 | Status: SHIPPED | OUTPATIENT
Start: 2025-04-24

## 2025-04-24 RX ORDER — ATORVASTATIN CALCIUM 10 MG/1
10 TABLET, FILM COATED ORAL DAILY
Qty: 90 TABLET | Refills: 1 | Status: SHIPPED | OUTPATIENT
Start: 2025-04-24

## 2025-04-24 RX ORDER — MONTELUKAST SODIUM 10 MG/1
10 TABLET ORAL NIGHTLY
Qty: 90 TABLET | Refills: 1 | Status: SHIPPED | OUTPATIENT
Start: 2025-04-24

## 2025-05-02 DIAGNOSIS — R39.14 BENIGN PROSTATIC HYPERPLASIA WITH INCOMPLETE BLADDER EMPTYING: ICD-10-CM

## 2025-05-02 DIAGNOSIS — N40.1 BENIGN PROSTATIC HYPERPLASIA WITH INCOMPLETE BLADDER EMPTYING: ICD-10-CM

## 2025-05-05 NOTE — TELEPHONE ENCOUNTER
Recent Visits  Date Type Provider Dept   04/08/25 [facility]   Showing recent visits within past 90 days and meeting all other requirements  Future Appointments  Date Type Provider Dept   07/08/25 Appointment Jose Manuel Estrada MD [facility]   Showing future appointments within next 90 days and meeting all other requirements

## 2025-05-06 RX ORDER — FINASTERIDE 5 MG/1
5 TABLET, FILM COATED ORAL DAILY
Qty: 90 TABLET | Refills: 1 | Status: SHIPPED | OUTPATIENT
Start: 2025-05-06 | End: 2026-05-06

## 2025-05-13 ENCOUNTER — HOSPITAL ENCOUNTER (OUTPATIENT)
Dept: RADIOLOGY | Facility: CLINIC | Age: 80
Discharge: HOME | End: 2025-05-13
Payer: MEDICARE

## 2025-05-13 DIAGNOSIS — M96.1 POST LAMINECTOMY SYNDROME: ICD-10-CM

## 2025-05-13 PROCEDURE — 72100 X-RAY EXAM L-S SPINE 2/3 VWS: CPT | Performed by: RADIOLOGY

## 2025-05-13 PROCEDURE — 72100 X-RAY EXAM L-S SPINE 2/3 VWS: CPT

## 2025-05-14 DIAGNOSIS — I10 ESSENTIAL (PRIMARY) HYPERTENSION: ICD-10-CM

## 2025-05-14 RX ORDER — LISINOPRIL 5 MG/1
5 TABLET ORAL DAILY
Qty: 90 TABLET | Refills: 1 | Status: SHIPPED | OUTPATIENT
Start: 2025-05-14

## 2025-05-14 NOTE — TELEPHONE ENCOUNTER
Recent Visits  Date Type Provider Dept   04/08/25 Office Visit Jose Manuel Estrada MD [facility]   01/07/25 Office Visit Jose Manuel Estrada MD [facility]   11/04/24 Office Visit Jose Manuel Estrada MD [facility]   10/03/24 Office Visit Jose Manuel Estrada MD [facility]   07/03/24 Office Visit Jose Manuel Estrada MD [facility]   Showing recent visits within past 365 days and meeting all other requirements  Future Appointments  Date Type Provider Dept   07/08/25 Appointment Jose Manuel Estrada MD [facility]   Showing future appointments within next 90 days and meeting all other requirements

## 2025-05-19 ENCOUNTER — APPOINTMENT (OUTPATIENT)
Dept: NEUROSURGERY | Facility: CLINIC | Age: 80
End: 2025-05-19
Payer: MEDICARE

## 2025-05-19 VITALS
SYSTOLIC BLOOD PRESSURE: 118 MMHG | DIASTOLIC BLOOD PRESSURE: 64 MMHG | HEIGHT: 72 IN | WEIGHT: 207.6 LBS | BODY MASS INDEX: 28.12 KG/M2

## 2025-05-19 DIAGNOSIS — M48.062 LUMBAR STENOSIS WITH NEUROGENIC CLAUDICATION: Primary | ICD-10-CM

## 2025-05-19 PROCEDURE — 1123F ACP DISCUSS/DSCN MKR DOCD: CPT | Performed by: STUDENT IN AN ORGANIZED HEALTH CARE EDUCATION/TRAINING PROGRAM

## 2025-05-19 PROCEDURE — 3074F SYST BP LT 130 MM HG: CPT | Performed by: STUDENT IN AN ORGANIZED HEALTH CARE EDUCATION/TRAINING PROGRAM

## 2025-05-19 PROCEDURE — 3078F DIAST BP <80 MM HG: CPT | Performed by: STUDENT IN AN ORGANIZED HEALTH CARE EDUCATION/TRAINING PROGRAM

## 2025-05-19 PROCEDURE — 1036F TOBACCO NON-USER: CPT | Performed by: STUDENT IN AN ORGANIZED HEALTH CARE EDUCATION/TRAINING PROGRAM

## 2025-05-19 PROCEDURE — 99214 OFFICE O/P EST MOD 30 MIN: CPT | Performed by: STUDENT IN AN ORGANIZED HEALTH CARE EDUCATION/TRAINING PROGRAM

## 2025-05-19 ASSESSMENT — PATIENT HEALTH QUESTIONNAIRE - PHQ9
SUM OF ALL RESPONSES TO PHQ9 QUESTIONS 1 AND 2: 0
2. FEELING DOWN, DEPRESSED OR HOPELESS: NOT AT ALL
1. LITTLE INTEREST OR PLEASURE IN DOING THINGS: NOT AT ALL

## 2025-05-19 NOTE — PROGRESS NOTES
Francis Vang is a 79 y.o. year old male s/p lumbar laminectomy/fusion presenting today for a 1 year follow-up    Past Surgical History:   Procedure Laterality Date    COLONOSCOPY      MULTIPLE TOOTH EXTRACTIONS      OTHER SURGICAL HISTORY  05/10/2019    Splenectomy    OTHER SURGICAL HISTORY  05/10/2019    Cholecystectomy    OTHER SURGICAL HISTORY      x3 back surgeries           Current Outpatient Medications:     allopurinol (Zyloprim) 300 mg tablet, TAKE 1 TABLET BY MOUTH EVERY DAY, Disp: 90 tablet, Rfl: 1    atorvastatin (Lipitor) 10 mg tablet, TAKE 1 TABLET BY MOUTH EVERY DAY, Disp: 90 tablet, Rfl: 1    blood sugar diagnostic strip, 1 strip 2 times a day., Disp: 100 each, Rfl: 2    blood-glucose meter misc, Needed for Glucose reading, Disp: 1 each, Rfl: 0    cyanocobalamin (Vitamin B-12) 1,000 mcg tablet, TAKE 1 TABLET BY MOUTH EVERY DAY, Disp: 100 tablet, Rfl: 1    finasteride (Proscar) 5 mg tablet, TAKE 1 TABLET (5 MG) BY MOUTH ONCE DAILY. DO NOT CRUSH, CHEW, OR SPLIT., Disp: 90 tablet, Rfl: 1    fluticasone-umeclidin-vilanter (Trelegy Ellipta) 200-62.5-25 mcg blister with device, Inhale 1 puff once daily in the morning. Take before meals., Disp: 3 each, Rfl: 1    hydroCHLOROthiazide (HYDRODiuril) 25 mg tablet, TAKE 1 TABLET BY MOUTH EVERY DAY, Disp: 90 tablet, Rfl: 1    Jardiance 25 mg, TAKE 1 TABLET BY MOUTH EVERY DAY, Disp: 90 tablet, Rfl: 1    lisinopril 5 mg tablet, TAKE 1 TABLET BY MOUTH EVERY DAY, Disp: 90 tablet, Rfl: 1    metoprolol succinate XL (Toprol XL) 25 mg 24 hr tablet, Take 1 tablet (25 mg) by mouth once daily. Do not crush or chew. (Patient not taking: Reported on 1/7/2025), Disp: 30 tablet, Rfl: 11    montelukast (Singulair) 10 mg tablet, TAKE 1 TABLET BY MOUTH EVERYDAY AT BEDTIME, Disp: 90 tablet, Rfl: 1    NIFEdipine XL 60 mg 24 hr tablet, TAKE 1 TABLET BY MOUTH EVERY DAY, Disp: 90 tablet, Rfl: 0    nitroglycerin (Nitrostat) 0.4 mg SL tablet, Place 1 tablet (0.4 mg) under the tongue  every 5 minutes if needed for chest pain., Disp: , Rfl:     OneTouch Delica Plus Lancet 33 gauge misc, 2 EACH 2 TIMES A DAY., Disp: 200 each, Rfl: 1    pioglitazone (Actos) 45 mg tablet, TAKE 1 TABLET BY MOUTH EVERY DAY, Disp: 90 tablet, Rfl: 1    propranolol (Inderal) 10 mg tablet, TAKE 1 TABLET BY MOUTH TWICE A DAY, Disp: 180 tablet, Rfl: 1    semaglutide (Ozempic) 0.25 mg or 0.5 mg (2 mg/3 mL) pen injector, INJECT 0.5 MG UNDER THE SKIN 1 (ONE) TIME PER WEEK., Disp: 3 mL, Rfl: 2    solifenacin (VESIcare) 10 mg tablet, TAKE 1 TABLET (10 MG) BY MOUTH ONCE DAILY. SWALLOW TABLET WHOLE DO NOT CRUSH, CHEW, OR SPLIT., Disp: 90 tablet, Rfl: 0    Xarelto 20 mg tablet, TAKE 1 TABLET BY MOUTH EVERY DAY, Disp: 90 tablet, Rfl: 1      There were no vitals filed for this visit.  Objective   Aox3  PERRL, EOMI   5/5 x 4   Incision well healed     Relevant Results    Upright xrays with hardware in good position    Assessment and Plan:   Francis Vang is a very nice 79 y.o. year old patient s/p L2-4 lami/fusion here for a post-op visit at 1 year. The patient is doing well from a post-op spine perspective with no more shooting pain down his leg. However, he still has some back pain when doing chores that require standing for a long time(making the bed for example). He denies any leg symptoms. He states he feels different since the surgery. Patient tried PT after surgery, but does not remember it being beneficial. I encouraged him to continue working out and to try aqua therapy, patient said he will think about it. We will follow up as needed.       Jessica Laureano MD    of Neurosurgery   [facility] [place]   [facility]   Office: [phone]  Fax: [phone]      Scribe Attestation  By signing my name below, [employer] Scribe   attest that this documentation has been prepared under the direction and in the presence of Jessica Laureano MD.

## 2025-05-22 ENCOUNTER — APPOINTMENT (OUTPATIENT)
Dept: NEUROSURGERY | Facility: CLINIC | Age: 80
End: 2025-05-22
Payer: MEDICARE

## 2025-06-03 DIAGNOSIS — Z79.01 LONG TERM (CURRENT) USE OF ANTICOAGULANTS: ICD-10-CM

## 2025-06-03 NOTE — TELEPHONE ENCOUNTER
Recent Visits  Date Type Provider Dept   04/08/25 Office Visit Jose Manuel Estrada MD [facility]   01/07/25 [facility]   Showing recent visits within past 180 days and meeting all other requirements  Future Appointments  Date Type Provider Dept   07/08/25 Appointment [facility]   Showing future appointments within next 90 days and meeting all other requirements

## 2025-06-04 RX ORDER — RIVAROXABAN 20 MG/1
20 TABLET, FILM COATED ORAL DAILY
Qty: 90 TABLET | Refills: 1 | Status: SHIPPED | OUTPATIENT
Start: 2025-06-04

## 2025-06-07 DIAGNOSIS — E11.69 DM TYPE 2 WITH DIABETIC DYSLIPIDEMIA (MULTI): ICD-10-CM

## 2025-06-07 DIAGNOSIS — E78.5 DM TYPE 2 WITH DIABETIC DYSLIPIDEMIA (MULTI): ICD-10-CM

## 2025-06-10 RX ORDER — SEMAGLUTIDE 0.68 MG/ML
0.5 INJECTION, SOLUTION SUBCUTANEOUS
Qty: 3 ML | Refills: 1 | Status: SHIPPED | OUTPATIENT
Start: 2025-06-15

## 2025-06-23 DIAGNOSIS — I10 ESSENTIAL (PRIMARY) HYPERTENSION: ICD-10-CM

## 2025-06-24 RX ORDER — NIFEDIPINE 60 MG/1
60 TABLET, EXTENDED RELEASE ORAL DAILY
Qty: 90 TABLET | Refills: 0 | Status: SHIPPED | OUTPATIENT
Start: 2025-06-24

## 2025-06-30 ENCOUNTER — APPOINTMENT (OUTPATIENT)
Dept: RADIOLOGY | Facility: CLINIC | Age: 80
End: 2025-06-30
Payer: MEDICARE

## 2025-07-08 ENCOUNTER — APPOINTMENT (OUTPATIENT)
Dept: PRIMARY CARE | Facility: CLINIC | Age: 80
End: 2025-07-08
Payer: MEDICARE

## 2025-07-08 VITALS
DIASTOLIC BLOOD PRESSURE: 54 MMHG | BODY MASS INDEX: 27.01 KG/M2 | HEART RATE: 52 BPM | HEIGHT: 72 IN | RESPIRATION RATE: 16 BRPM | TEMPERATURE: 97.4 F | OXYGEN SATURATION: 92 % | SYSTOLIC BLOOD PRESSURE: 110 MMHG | WEIGHT: 199.4 LBS

## 2025-07-08 DIAGNOSIS — I48.0 PAROXYSMAL ATRIAL FIBRILLATION (MULTI): ICD-10-CM

## 2025-07-08 DIAGNOSIS — M47.26 OSTEOARTHRITIS OF SPINE WITH RADICULOPATHY, LUMBAR REGION: ICD-10-CM

## 2025-07-08 DIAGNOSIS — Z00.00 ROUTINE GENERAL MEDICAL EXAMINATION AT HEALTH CARE FACILITY: Primary | ICD-10-CM

## 2025-07-08 DIAGNOSIS — N18.32 STAGE 3B CHRONIC KIDNEY DISEASE (MULTI): ICD-10-CM

## 2025-07-08 DIAGNOSIS — K58.1 IRRITABLE BOWEL SYNDROME WITH CONSTIPATION: ICD-10-CM

## 2025-07-08 DIAGNOSIS — G47.33 OSA ON CPAP: ICD-10-CM

## 2025-07-08 DIAGNOSIS — E78.5 DM TYPE 2 WITH DIABETIC DYSLIPIDEMIA (MULTI): ICD-10-CM

## 2025-07-08 DIAGNOSIS — E55.9 VITAMIN D DEFICIENCY: ICD-10-CM

## 2025-07-08 DIAGNOSIS — I82.5Y9 CHRONIC DEEP VEIN THROMBOSIS (DVT) OF PROXIMAL VEIN OF LOWER EXTREMITY, UNSPECIFIED LATERALITY: ICD-10-CM

## 2025-07-08 DIAGNOSIS — E78.2 MIXED HYPERLIPIDEMIA: ICD-10-CM

## 2025-07-08 DIAGNOSIS — Z98.890 H/O LUMBOSACRAL SPINE SURGERY: ICD-10-CM

## 2025-07-08 DIAGNOSIS — K21.9 GASTROESOPHAGEAL REFLUX DISEASE WITHOUT ESOPHAGITIS: ICD-10-CM

## 2025-07-08 DIAGNOSIS — R53.83 FATIGUE, UNSPECIFIED TYPE: ICD-10-CM

## 2025-07-08 DIAGNOSIS — E11.69 DM TYPE 2 WITH DIABETIC DYSLIPIDEMIA (MULTI): ICD-10-CM

## 2025-07-08 DIAGNOSIS — M54.16 LUMBAR RADICULOPATHY: ICD-10-CM

## 2025-07-08 DIAGNOSIS — M48.062 LUMBAR STENOSIS WITH NEUROGENIC CLAUDICATION: ICD-10-CM

## 2025-07-08 LAB
25(OH)D3+25(OH)D2 SERPL-MCNC: 53 NG/ML (ref 30–100)
ALBUMIN SERPL-MCNC: 3.6 G/DL (ref 3.6–5.1)
ALBUMIN/CREAT UR: 5 MG/G CREAT
ALP SERPL-CCNC: 54 U/L (ref 35–144)
ALT SERPL-CCNC: 10 U/L (ref 9–46)
ANION GAP SERPL CALCULATED.4IONS-SCNC: 7 MMOL/L (CALC) (ref 7–17)
AST SERPL-CCNC: 11 U/L (ref 10–35)
BASOPHILS # BLD AUTO: 39 CELLS/UL (ref 0–200)
BASOPHILS NFR BLD AUTO: 0.7 %
BILIRUB SERPL-MCNC: 0.5 MG/DL (ref 0.2–1.2)
BUN SERPL-MCNC: 35 MG/DL (ref 7–25)
CALCIUM SERPL-MCNC: 9.4 MG/DL (ref 8.6–10.3)
CHLORIDE SERPL-SCNC: 105 MMOL/L (ref 98–110)
CHOLEST SERPL-MCNC: 88 MG/DL
CHOLEST/HDLC SERPL: 2.8 (CALC)
CO2 SERPL-SCNC: 29 MMOL/L (ref 20–32)
CREAT SERPL-MCNC: 1.99 MG/DL (ref 0.7–1.28)
CREAT UR-MCNC: 124 MG/DL (ref 20–320)
EGFRCR SERPLBLD CKD-EPI 2021: 34 ML/MIN/1.73M2
EOSINOPHIL # BLD AUTO: 220 CELLS/UL (ref 15–500)
EOSINOPHIL NFR BLD AUTO: 4 %
ERYTHROCYTE [DISTWIDTH] IN BLOOD BY AUTOMATED COUNT: 14.6 % (ref 11–15)
EST. AVERAGE GLUCOSE BLD GHB EST-MCNC: 140 MG/DL
EST. AVERAGE GLUCOSE BLD GHB EST-SCNC: 7.7 MMOL/L
GLUCOSE SERPL-MCNC: 106 MG/DL (ref 65–99)
HBA1C MFR BLD: 6.5 %
HCT VFR BLD AUTO: 37.1 % (ref 38.5–50)
HDLC SERPL-MCNC: 31 MG/DL
HGB BLD-MCNC: 11.9 G/DL (ref 13.2–17.1)
LDLC SERPL CALC-MCNC: 41 MG/DL (CALC)
LYMPHOCYTES # BLD AUTO: 1584 CELLS/UL (ref 850–3900)
LYMPHOCYTES NFR BLD AUTO: 28.8 %
MCH RBC QN AUTO: 32.4 PG (ref 27–33)
MCHC RBC AUTO-ENTMCNC: 32.1 G/DL (ref 32–36)
MCV RBC AUTO: 101.1 FL (ref 80–100)
MICROALBUMIN UR-MCNC: 0.6 MG/DL
MONOCYTES # BLD AUTO: 825 CELLS/UL (ref 200–950)
MONOCYTES NFR BLD AUTO: 15 %
NEUTROPHILS # BLD AUTO: 2833 CELLS/UL (ref 1500–7800)
NEUTROPHILS NFR BLD AUTO: 51.5 %
NONHDLC SERPL-MCNC: 57 MG/DL (CALC)
PLATELET # BLD AUTO: 243 THOUSAND/UL (ref 140–400)
PMV BLD REES-ECKER: 11.1 FL (ref 7.5–12.5)
POTASSIUM SERPL-SCNC: 4.3 MMOL/L (ref 3.5–5.3)
PROT SERPL-MCNC: 6.2 G/DL (ref 6.1–8.1)
PSA SERPL-MCNC: 1.03 NG/ML
RBC # BLD AUTO: 3.67 MILLION/UL (ref 4.2–5.8)
SODIUM SERPL-SCNC: 141 MMOL/L (ref 135–146)
TRIGL SERPL-MCNC: 79 MG/DL
TSH SERPL-ACNC: 1.7 MIU/L (ref 0.4–4.5)
WBC # BLD AUTO: 5.5 THOUSAND/UL (ref 3.8–10.8)

## 2025-07-08 PROCEDURE — 99214 OFFICE O/P EST MOD 30 MIN: CPT | Performed by: FAMILY MEDICINE

## 2025-07-08 PROCEDURE — G0439 PPPS, SUBSEQ VISIT: HCPCS | Performed by: FAMILY MEDICINE

## 2025-07-08 RX ORDER — DOCUSATE SODIUM 100 MG/1
100 CAPSULE, LIQUID FILLED ORAL 2 TIMES DAILY PRN
Start: 2025-07-08

## 2025-07-08 ASSESSMENT — ENCOUNTER SYMPTOMS
DEPRESSION: 0
LOSS OF SENSATION IN FEET: 0
OCCASIONAL FEELINGS OF UNSTEADINESS: 0

## 2025-07-08 ASSESSMENT — ACTIVITIES OF DAILY LIVING (ADL)
MANAGING_FINANCES: INDEPENDENT
BATHING: INDEPENDENT
DOING_HOUSEWORK: INDEPENDENT
DRESSING: INDEPENDENT
TAKING_MEDICATION: INDEPENDENT
GROCERY_SHOPPING: INDEPENDENT

## 2025-07-08 NOTE — PROGRESS NOTES
Subjective   Reason for Visit: Francis Vang is an 79 y.o. male here for a Medicare Wellness visit.     Past Medical, Surgical, and Family History reviewed and updated in chart.    Reviewed all medications by prescribing practitioner or clinical pharmacist (such as prescriptions, OTCs, herbal therapies and supplements) and documented in the medical record.    HPI    Patient Care Team:  Jose Manuel Estrada MD as PCP - General  Jose Manuel Estrada MD as PCP - [employer] PCP  Jessica Laureano MD as Surgeon (Neurosurgery)     Review of Systems    Objective   Vitals:  /54 (BP Location: Left arm, Patient Position: Sitting, BP Cuff Size: Adult)   Pulse 52   Temp 36.3 °C (97.4 °F) (Temporal)   Resp 16   Ht 1.829 m (6')   Wt 90.4 kg (199 lb 6.4 oz)   SpO2 92%   BMI 27.04 kg/m²       Physical Exam    Assessment & Plan  Routine general medical examination at health care facility    Orders:    1 Year Follow Up In [facility] - PCP - Wellness Exam; Future

## 2025-07-08 NOTE — PROGRESS NOTES
Subjective   Patient ID: Francis Vang is a 79 y.o. male who presents for Medicare Annual Wellness Visit Subsequent (Discuss lab results from 7/7/25).  History of Present Illness  The patient presents for evaluation of laryngitis, breathing issues, atrial fibrillation, DVT, diabetes, and constipation.    He reports a recurrence of laryngitis, which he has experienced in the past. The current episode has been ongoing for approximately a week. He does not have any known allergies and has not received an allergy injection this year. He also does not consume caffeine excessively. He does not take any allergy medications. His symptoms typically worsen in the spring and fall, but have been less severe in recent years.    He notes that his breathing has not improved. He is currently using an inhaler, but it is nearly depleted and he does not believe it is effective. He has noticed that his breathing is not worse when he is out of the inhaler. He maintains his lawn by riding a mower in the backyard and walking behind a mower in the front yard. This year, he has only mowed a few times, but was able to complete the task without needing to use his inhaler. He is considering discontinuing the inhaler to see if there is any change in his condition. He has a prescription refill available if needed.    He is currently on blood thinners for both atrial fibrillation and deep vein thrombosis (DVT). He takes propranolol 10 mg twice daily.    He is taking Ozempic 0.5 mg for diabetes management. He has lost weight and currently weighs 200 pounds, which is the lowest he has been since high school. He is not experiencing nausea, but does report constipation. He plans to increase his intake of fruits and vegetables to manage this side effect.    He reports no issues with his ears or abdomen. He is not on any thyroid medication.  See Above  Review of Systems  12 Systems have been reviewed as follows.  Constitutional: Fever, weight gain,  weight loss, appetite change, night sweats, fatigue, chills.  Eyes : blurry, double vision, vision, loss, tearing, redness, pain, sensitivity to light, glaucoma.  Ears, nose, mouth, and throat: Hearing loss, ringing in the ears, ear pain, nasal congestion, nasal drainage, nosebleeds, mouth, throat, irritation tooth problem.  Cardiovascular :chest pain, pressure, heart racing, palpitations, sweating, leg swelling, high or low blood pressure  Pulmonary: Cough, yellow or green sputum, blood and sputum, shortness of breath, wheezing  Gastrointestinal: Nausea, vomiting, diarrhea, constipation, pain, blood in stool, or vomitus, heartburn, difficulty swallowing  Genitourinary: incontinence, abnormal bleeding, abnormal discharge, urinary frequency, urinary hesitancy, pain, impotence sexual problem, infection, urinary retention  Musculoskeletal: Pain, stiffness, joint, redness or warmth, arthritis, back pain, weakness, muscle wasting, sprain or fracture  Neuro: Weight weakness, dizziness, change in voice, change in taste change in vision, change in hearing, loss, or change of sensation, trouble walking, balance problems coordination problems, shaking, speech problem  Endocrine , cold or heat intolerance, blood sugar problem, weight gain or loss missed periods hot flashes, sweats, change in body hair, change in libido, increased thirst, increased urination  Heme/lymph: Swelling, bleeding, problem anemia, bruising, enlarged lymph nodes  Allergic/immunologic: H. plus nasal drip, watery itchy eyes, nasal drainage, immunosuppressed  The above were reviewed and noted negative except as noted in HPI and Problem List.    Objective     /54 (BP Location: Left arm, Patient Position: Sitting, BP Cuff Size: Adult)   Pulse 52   Temp 36.3 °C (97.4 °F) (Temporal)   Resp 16   Ht 1.829 m (6')   Wt 90.4 kg (199 lb 6.4 oz)   SpO2 92%   BMI 27.04 kg/m²      Physical Exam  Mouth/Throat: Throat examination revealed signs of  laryngitis.  Other: Weight is 199 pounds.  Constitutional: Well developed, well nourished, alert and in no acute distress   Eyes: Normal external exam. Pupils equally round and reactive to light with normal accommodation and extraocular movements intact.  Neck: Supple, no lymphadenopathy or masses.   Cardiovascular: Regular rate and rhythm, normal S1 and S2, no murmurs, gallops, or rubs. Radial pulses normal. No peripheral edema.  Pulmonary: No respiratory distress, lungs clear to auscultation bilaterally. No wheezes, rhonchi, rales.  Abdomen: soft,non tender, non distended, without masses or HSM  Skin: Warm, well perfused, normal skin turgor and color.   Neurologic: Cranial nerves II-XII grossly intact.   Psychiatric: Mood calm and affect normal  Musculoskeletal: Moving all extremities without restriction  The above were reviewed and noted negative except as noted in HPI and Problem List.      Results  Labs   - PSA: 1.03 ng/mL   - A1c: 6.5% (previously 6.9%)   - TSH: Normal   - Vitamin D: 53 ng/mL   - Glucose: 103 mg/dL   - GFR: Decreased slightly   - Cholesterol: 88 mg/dL   - LDL: 41 mg/dL   - Hemoglobin: 11.9 g/dL         Assessment & Plan  1. Laryngitis.  - Symptoms suggest a possible allergic reaction.  - Throat examination shows no severe issues.  - Claritin will be prescribed for use as needed.  - If symptoms persist beyond a week to 10 days, he should contact the office to request an allergy injection.    2. Breathing issues.  - Reports no improvement with the current inhaler.  - Physical activity such as mowing the lawn is used to gauge breathing capacity.  - He will discontinue the use of Trelegy for a month and monitor his symptoms.  - If he wishes to resume the medication, he will inform the office.    3. Atrial Fibrillation.  - Currently on a blood thinner for atrial fibrillation and deep vein thrombosis (DVT).  - Continues to take propranolol 10 mg twice a day.  - No new symptoms reported.  - He will  continue his current medication regimen.    4. Diabetes Mellitus.  - A1c has decreased from 6.9 to 6.5, indicating good control.  - Weight has decreased to 199 lbs, the lowest since high school.  - Continues with Ozempic 0.5 mg.  - Advised to increase intake of fruits and vegetables to manage constipation, a side effect of Ozempic.  - Colace can be used as needed for constipation.    Back pain post-surgery  Persistent back pain post-surgery with some improvement in walking. 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 On Ozempic and Jardiance, which also aids in kidney protection. Cholesterol levels well-controlled with LDL at 47.  - Continue current diabetes medications  - Monitor A1c and cholesterol levels in three months  - Order urine test for protein to monitor kidney function     Chronic kidney disease   On Jardiance, which provides renal protection.  - Continue Jardiance  - Monitor kidney function with GFR in three months     Vitamin D deficiency  Vitamin D level is 38, within the normal range.  - Monitor vitamin D levels in three months     Anemia  Mild anemia with hemoglobin at 12.3 and hematocrit at 36.4, showing slight improvement.  - Monitor CBC in three months  Follow-up  - A follow-up visit is scheduled in 3 months.    Problem List Items Addressed This Visit       DM type 2 with diabetic dyslipidemia (Multi)    Relevant Orders    Hemoglobin A1C    Follow Up [facility] - PCP - Established    Fatigue    Relevant Orders    CBC and Auto Differential    Follow Up [facility] - PCP - Established    GERD (gastroesophageal reflux disease)    Relevant Orders    Follow Up [facility] - PCP - Established    Hyperlipidemia    Relevant Orders    Comprehensive Metabolic Panel    Lipid Panel    Follow Up  [facility] - PCP - Established    Osteoarthritis of spine with radiculopathy, lumbar region    Relevant Orders    Follow Up [facility] - PCP - Established    Vitamin D deficiency    Relevant Orders    Vitamin D 25-Hydroxy,Total (for eval of Vitamin D levels)    Follow Up [facility] - PCP - Established    CKD (chronic kidney disease)    Relevant Orders    Follow Up [facility] - PCP - Established    H/O lumbosacral spine surgery    Relevant Orders    Follow Up [facility] - PCP - Established    CHRISTY on CPAP    Relevant Orders    Follow Up [facility] - PCP - Established    Lumbar stenosis with neurogenic claudication    Relevant Orders    Follow Up [facility] - PCP - Established    Lumbar radiculopathy    Relevant Orders    Follow Up [facility] - PCP - Established    Chronic deep vein thrombosis (DVT) of proximal vein of lower extremity, unspecified laterality    Relevant Orders    Follow Up [facility] - PCP - Established    Paroxysmal atrial fibrillation (Multi)    Relevant Orders    Follow Up [facility] - PCP - Established     Other Visit Diagnoses         Routine general medical examination at health care facility    -  Primary    Relevant Orders    1 Year Follow Up [facility] - PCP - Wellness Exam    Follow Up [facility] - PCP - Established      Irritable bowel syndrome with constipation        Relevant Medications    docusate sodium (Colace) 100 mg capsule    Other Relevant Orders    Follow Up [facility] - PCP - Established                   Continue current medications and therapy for chronic medical conditions         pt also stopped sotalol due to bradycardia per cardiologist   STOP METOPROLOL INEFFECTIVE BEGIN PROPRANOLOL 10 MG bid for tremor     Lesion removal ( 2) via cryo treatment nose & arm          Check next     Patient does  not feel breztri helped symptoms   patient is taking Trelegy but pt stopped        Urine & bowel incont     Improved with vesicare & proscar and surgery     Could not tolerate metformin       PSA q 6 months        Nuclear stress wnl 10/2022      ozempic  0.5 mg weekly  given  Pharmacy referral placed     surgery on back 1/31/24 with ..   restarted pregabalin 75 mg BID      Exercise at home on legs at shower 5-10 min  every day.     ECHO done 7/24/24    Jose Manuel Estrada MD       This medical note was created with the assistance of artificial intelligence (AI) for documentation purposes. The content has been reviewed and confirmed by the healthcare provider for accuracy and completeness. Patient consented to the use of audio recording and use of AI during their visit.

## 2025-07-11 DIAGNOSIS — R39.14 BENIGN PROSTATIC HYPERPLASIA WITH INCOMPLETE BLADDER EMPTYING: ICD-10-CM

## 2025-07-11 DIAGNOSIS — I10 ESSENTIAL (PRIMARY) HYPERTENSION: ICD-10-CM

## 2025-07-11 DIAGNOSIS — J45.901 MODERATE ASTHMA WITH ACUTE EXACERBATION, UNSPECIFIED WHETHER PERSISTENT (HHS-HCC): ICD-10-CM

## 2025-07-11 DIAGNOSIS — E78.5 HYPERLIPIDEMIA, UNSPECIFIED: ICD-10-CM

## 2025-07-11 DIAGNOSIS — N40.1 BENIGN PROSTATIC HYPERPLASIA WITH INCOMPLETE BLADDER EMPTYING: ICD-10-CM

## 2025-07-11 DIAGNOSIS — R25.1 TREMOR: ICD-10-CM

## 2025-07-11 DIAGNOSIS — E11.69 TYPE 2 DIABETES MELLITUS WITH OTHER SPECIFIED COMPLICATION: ICD-10-CM

## 2025-07-12 RX ORDER — PIOGLITAZONE 45 MG/1
45 TABLET ORAL DAILY
Qty: 90 TABLET | Refills: 1 | Status: SHIPPED | OUTPATIENT
Start: 2025-07-12

## 2025-07-12 RX ORDER — HYDROCHLOROTHIAZIDE 25 MG/1
25 TABLET ORAL DAILY
Qty: 90 TABLET | Refills: 1 | Status: SHIPPED | OUTPATIENT
Start: 2025-07-12

## 2025-07-12 RX ORDER — PROPRANOLOL HYDROCHLORIDE 10 MG/1
10 TABLET ORAL 2 TIMES DAILY
Qty: 180 TABLET | Refills: 1 | Status: SHIPPED | OUTPATIENT
Start: 2025-07-12

## 2025-07-12 RX ORDER — NIFEDIPINE 60 MG/1
60 TABLET, EXTENDED RELEASE ORAL DAILY
Qty: 90 TABLET | Refills: 0 | Status: SHIPPED | OUTPATIENT
Start: 2025-07-12

## 2025-07-12 RX ORDER — FLUTICASONE FUROATE, UMECLIDINIUM BROMIDE AND VILANTEROL TRIFENATATE 200; 62.5; 25 UG/1; UG/1; UG/1
1 POWDER RESPIRATORY (INHALATION)
Qty: 180 EACH | Refills: 1 | Status: SHIPPED | OUTPATIENT
Start: 2025-07-12

## 2025-07-12 RX ORDER — SOLIFENACIN SUCCINATE 10 MG/1
10 TABLET, FILM COATED ORAL DAILY
Qty: 90 TABLET | Refills: 0 | Status: SHIPPED | OUTPATIENT
Start: 2025-07-12 | End: 2025-10-10

## 2025-07-12 RX ORDER — EMPAGLIFLOZIN 25 MG/1
25 TABLET, FILM COATED ORAL DAILY
Qty: 90 TABLET | Refills: 1 | Status: SHIPPED | OUTPATIENT
Start: 2025-07-12

## 2025-08-02 DIAGNOSIS — E78.5 DM TYPE 2 WITH DIABETIC DYSLIPIDEMIA (MULTI): ICD-10-CM

## 2025-08-02 DIAGNOSIS — E11.69 DM TYPE 2 WITH DIABETIC DYSLIPIDEMIA (MULTI): ICD-10-CM

## 2025-08-04 RX ORDER — SEMAGLUTIDE 0.68 MG/ML
0.5 INJECTION, SOLUTION SUBCUTANEOUS
Qty: 3 ML | Refills: 1 | Status: SHIPPED | OUTPATIENT
Start: 2025-08-04

## 2025-10-09 ENCOUNTER — APPOINTMENT (OUTPATIENT)
Dept: PRIMARY CARE | Facility: CLINIC | Age: 80
End: 2025-10-09
Payer: MEDICARE

## (undated) DEVICE — COUNTER NDL 40 COUNT HLD 70 FOAM BLK ADH W/ MAG

## (undated) DEVICE — SUTURE VCRL SZ 2-0 L27IN ABSRB UD L36MM CP-1 1/2 CIR REV J266H

## (undated) DEVICE — INTENDED FOR TISSUE SEPARATION, AND OTHER PROCEDURES THAT REQUIRE A SHARP SURGICAL BLADE TO PUNCTURE OR CUT.: Brand: BARD-PARKER ® CARBON RIB-BACK BLADES

## (undated) DEVICE — PACK,LAPAROTOMY,NO GOWNS: Brand: MEDLINE

## (undated) DEVICE — WAX SURG 2.5GM HEMSTAT BNE BEESWAX PARAFFIN ISO PALMITATE

## (undated) DEVICE — 1010 S-DRAPE TOWEL DRAPE 10/BX: Brand: STERI-DRAPE™

## (undated) DEVICE — TOWEL PACK, STERILE, 4/PACK, BLUE

## (undated) DEVICE — SUTURE, POLYSORB 2/0  36  UNDYED  GS-21  209P"

## (undated) DEVICE — SYRINGE IRRIG 60ML SFT PLIABLE BLB EZ TO GRP 1 HND USE W/

## (undated) DEVICE — Device

## (undated) DEVICE — RESERVOIR, WOUND, W/TROCAR, PVC, MEDIUM, 400CC, DAVOL, 1/8 IN, 10FR

## (undated) DEVICE — GLOVE, SURGICAL, PROTEXIS PI BLUE W/NEUTHERA, 7.0, PF, LF

## (undated) DEVICE — STAPLER, SKIN, PLUS, WIDE, 35

## (undated) DEVICE — SUTURE, PROLENE, 6-0, 24 IN, BV1, DA, BLUE

## (undated) DEVICE — ELECTRODE, ELECTROSURGICAL, BLADE EXT 4 INCH, INSULATED

## (undated) DEVICE — DRESSING, ADHESIVE, ISLAND, TELFA, 4 X 14 IN

## (undated) DEVICE — TOOL, MR8, 12CM MATCH HEAD, 3MM DIA

## (undated) DEVICE — TUBING, SUCTION, 9/32" X 20', STRAIGHT: Brand: MEDLINE INDUSTRIES, INC.

## (undated) DEVICE — APPLICATOR MEDICATED 10.5 CC SOLUTION HI LT ORNG CHLORAPREP

## (undated) DEVICE — SYRINGE MED 10ML LUERLOCK TIP W/O SFTY DISP

## (undated) DEVICE — DRAIN SURG 10FR 100% SIL RND END PERF W/ TRCR

## (undated) DEVICE — SUTURE, SILK, 2-0, 18 IN, FSL, BLACK

## (undated) DEVICE — APPLICATOR, CHLORAPREP, W/ORANGE TINT, 26ML

## (undated) DEVICE — SINGLE PORT MANIFOLD: Brand: NEPTUNE 2

## (undated) DEVICE — GAUZE,SPONGE,4"X4",16PLY,XRAY,STRL,LF: Brand: MEDLINE

## (undated) DEVICE — 400CC THREE-SPRING HEMOVAC-TYPE EVACUATOR KIT W/ 3.2MM PVC DRAIN, 12IN HOLE PATTERN, TROCAR & Y-CONNECTOR TUBING

## (undated) DEVICE — GLOVE ORANGE PI 8   MSG9080

## (undated) DEVICE — COVER LT HNDL BLU PLAS

## (undated) DEVICE — DRAPE EQUIP TRNSPRT CONTAINMENT FOR BK TAB

## (undated) DEVICE — SYRINGE MED 30ML STD CLR PLAS LUERLOCK TIP N CTRL DISP

## (undated) DEVICE — SHEET,DRAPE,53X77,STERILE: Brand: MEDLINE

## (undated) DEVICE — CORD,CAUTERY,BIPOLAR,STERILE: Brand: MEDLINE

## (undated) DEVICE — KAIRISON TUBING SET PNEUMATIC, (3000 MM), STERILE, DISPOSABLE, TO BE USED WITH: FK898R, PACKAGE OF 10 PIECES: Brand: KAIRISON

## (undated) DEVICE — ALCON SURGICAL BLADE 64: Brand: ALCON

## (undated) DEVICE — TRAY, SURESTEP, SILICONE DRAINAGE BAG, STATLOCK, 16FR

## (undated) DEVICE — 3M™ TEGADERM™ TRANSPARENT FILM DRESSING FRAME STYLE, 1624W, 2-3/8 IN X 2-3/4 IN (6 CM X 7 CM), 100/CT 4CT/CASE: Brand: 3M™ TEGADERM™

## (undated) DEVICE — TOWEL,OR,DSP,ST,BLUE,STD,4/PK,20PK/CS: Brand: MEDLINE

## (undated) DEVICE — LABEL MED MINI W/ MARKER

## (undated) DEVICE — CODMAN® SURGICAL PATTIES 1/2" X 3" (1.27CM X 7.62CM): Brand: CODMAN®

## (undated) DEVICE — BLADE, MILL BONE, MEDIUM, DISP

## (undated) DEVICE — GLOVE ORANGE PI 7 1/2   MSG9075

## (undated) DEVICE — HYPODERMIC SAFETY NEEDLE: Brand: MAGELLAN

## (undated) DEVICE — SUTURE VCRL SZ 4-0 L18IN ABSRB UD L19MM PS-2 3/8 CIR PRIM J496H

## (undated) DEVICE — SEALANT, HEMOSTATIC, FLOSEAL, 10 ML

## (undated) DEVICE — CATHETER IV 16 GAX2 IN STR INTROCAN SAFETY

## (undated) DEVICE — GAUZE,SPONGE,2"X2",8PLY,STERILE,LF,2'S: Brand: MEDLINE

## (undated) DEVICE — ELECTRODE PT RET AD L9FT HI MOIST COND ADH HYDRGEL CORDED

## (undated) DEVICE — APPLICATOR MEDICATED 26 CC SOLUTION HI LT ORNG CHLORAPREP

## (undated) DEVICE — C-ARM: Brand: UNBRANDED

## (undated) DEVICE — 3.0MM PRECISION NEURO (MATCH HEAD)

## (undated) DEVICE — SUTURE, VICRYL, 0, 18 IN, CT-1, UNDYED

## (undated) DEVICE — DRAPE EQUIP MICSCP 120X48 IN ANGLED GLS LENS HSNG FOR LEICA

## (undated) DEVICE — 3M™ IOBAN™ 2 ANTIMICROBIAL INCISE DRAPE 6650EZ: Brand: IOBAN™ 2

## (undated) DEVICE — NEPTUNE E-SEP SMOKE EVACUATION PENCIL, COATED, 70MM BLADE, PUSH BUTTON SWITCH: Brand: NEPTUNE E-SEP

## (undated) DEVICE — GOWN,AURORA,NONREINFORCED,LARGE: Brand: MEDLINE

## (undated) DEVICE — MARKER SURG SKIN GENTIAN VLT REG TIP W/ 6IN RUL

## (undated) DEVICE — SUTURE VCRL SZ 0 L27IN ABSRB UD L26MM CP-2 1/2 CIR SGL J870H

## (undated) DEVICE — CAUTERY, PENCIL, PUSH BUTTON, SMOKE EVAC, 70MM

## (undated) DEVICE — 3M™ STERI-DRAPE™ INSTRUMENT POUCH 1018: Brand: STERI-DRAPE™

## (undated) DEVICE — NEEDLE SPINAL 22GA L3.5IN SPINOCAN

## (undated) DEVICE — SUTURE VCRL + SZ 3-0 L18IN ABSRB UD PS-2 3/8 CIR REV CUT VCP497H

## (undated) DEVICE — CODMAN® SURGICAL PATTIES 1/2" X 1/2" (1.27CM X 1.27CM): Brand: CODMAN®

## (undated) DEVICE — KIT EVAC 400CC PVC RADPQ Y CONN